# Patient Record
Sex: MALE | Race: ASIAN | NOT HISPANIC OR LATINO | Employment: UNEMPLOYED | URBAN - METROPOLITAN AREA
[De-identification: names, ages, dates, MRNs, and addresses within clinical notes are randomized per-mention and may not be internally consistent; named-entity substitution may affect disease eponyms.]

---

## 2020-06-12 ENCOUNTER — COMMUNICATION - HEALTHEAST (OUTPATIENT)
Dept: SCHEDULING | Facility: CLINIC | Age: 40
End: 2020-06-12

## 2020-06-15 ENCOUNTER — COMMUNICATION - HEALTHEAST (OUTPATIENT)
Dept: NURSING | Facility: CLINIC | Age: 40
End: 2020-06-15

## 2020-06-15 ENCOUNTER — OFFICE VISIT - HEALTHEAST (OUTPATIENT)
Dept: FAMILY MEDICINE | Facility: CLINIC | Age: 40
End: 2020-06-15

## 2020-06-15 DIAGNOSIS — Z76.89 ENCOUNTER TO ESTABLISH CARE: ICD-10-CM

## 2020-06-15 DIAGNOSIS — G47.00 INSOMNIA, UNSPECIFIED TYPE: ICD-10-CM

## 2020-06-15 DIAGNOSIS — Z79.4 TYPE 2 DIABETES MELLITUS WITHOUT COMPLICATION, WITH LONG-TERM CURRENT USE OF INSULIN (H): ICD-10-CM

## 2020-06-15 DIAGNOSIS — E11.9 TYPE 2 DIABETES MELLITUS WITHOUT COMPLICATION, WITH LONG-TERM CURRENT USE OF INSULIN (H): ICD-10-CM

## 2020-06-15 DIAGNOSIS — F06.31 DEPRESSIVE DISORDER DUE TO ANOTHER MEDICAL CONDITION WITH DEPRESSIVE FEATURES: ICD-10-CM

## 2020-06-15 ASSESSMENT — PATIENT HEALTH QUESTIONNAIRE - PHQ9: SUM OF ALL RESPONSES TO PHQ QUESTIONS 1-9: 9

## 2020-06-19 ENCOUNTER — COMMUNICATION - HEALTHEAST (OUTPATIENT)
Dept: NURSING | Facility: CLINIC | Age: 40
End: 2020-06-19

## 2020-06-19 ENCOUNTER — COMMUNICATION - HEALTHEAST (OUTPATIENT)
Dept: CARE COORDINATION | Facility: CLINIC | Age: 40
End: 2020-06-19

## 2020-06-19 DIAGNOSIS — F43.21 ADJUSTMENT DISORDER WITH DEPRESSED MOOD: ICD-10-CM

## 2020-06-19 DIAGNOSIS — F06.31 DEPRESSIVE DISORDER DUE TO ANOTHER MEDICAL CONDITION WITH DEPRESSIVE FEATURES: ICD-10-CM

## 2020-06-19 DIAGNOSIS — F32.2 SEVERE MAJOR DEPRESSION WITHOUT PSYCHOTIC FEATURES (H): ICD-10-CM

## 2020-06-19 ASSESSMENT — ACTIVITIES OF DAILY LIVING (ADL)
DEPENDENT_IADLS:: INDEPENDENT
DEPENDENT_IADLS:: INDEPENDENT

## 2020-06-29 ENCOUNTER — HOME CARE/HOSPICE - HEALTHEAST (OUTPATIENT)
Dept: HOME HEALTH SERVICES | Facility: HOME HEALTH | Age: 40
End: 2020-06-29

## 2020-06-29 ENCOUNTER — RECORDS - HEALTHEAST (OUTPATIENT)
Dept: ADMINISTRATIVE | Facility: OTHER | Age: 40
End: 2020-06-29

## 2020-06-29 ENCOUNTER — COMMUNICATION - HEALTHEAST (OUTPATIENT)
Dept: NURSING | Facility: CLINIC | Age: 40
End: 2020-06-29

## 2020-06-29 DIAGNOSIS — F06.31 DEPRESSIVE DISORDER DUE TO ANOTHER MEDICAL CONDITION WITH DEPRESSIVE FEATURES: ICD-10-CM

## 2020-06-29 DIAGNOSIS — E11.9 TYPE 2 DIABETES MELLITUS WITHOUT COMPLICATION, WITH LONG-TERM CURRENT USE OF INSULIN (H): ICD-10-CM

## 2020-06-29 DIAGNOSIS — Z79.4 TYPE 2 DIABETES MELLITUS WITHOUT COMPLICATION, WITH LONG-TERM CURRENT USE OF INSULIN (H): ICD-10-CM

## 2020-06-29 DIAGNOSIS — F32.2 SEVERE MAJOR DEPRESSION WITHOUT PSYCHOTIC FEATURES (H): ICD-10-CM

## 2020-07-01 ENCOUNTER — COMMUNICATION - HEALTHEAST (OUTPATIENT)
Dept: HOME HEALTH SERVICES | Facility: HOME HEALTH | Age: 40
End: 2020-07-01

## 2020-07-02 ENCOUNTER — COMMUNICATION - HEALTHEAST (OUTPATIENT)
Dept: CARE COORDINATION | Facility: CLINIC | Age: 40
End: 2020-07-02

## 2020-07-02 ENCOUNTER — COMMUNICATION - HEALTHEAST (OUTPATIENT)
Dept: SCHEDULING | Facility: CLINIC | Age: 40
End: 2020-07-02

## 2020-07-03 ENCOUNTER — OFFICE VISIT - HEALTHEAST (OUTPATIENT)
Dept: FAMILY MEDICINE | Facility: CLINIC | Age: 40
End: 2020-07-03

## 2020-07-03 DIAGNOSIS — H10.13 ALLERGIC CONJUNCTIVITIS, BILATERAL: ICD-10-CM

## 2020-07-03 DIAGNOSIS — E11.9 TYPE 2 DIABETES MELLITUS WITHOUT COMPLICATION, WITH LONG-TERM CURRENT USE OF INSULIN (H): ICD-10-CM

## 2020-07-03 DIAGNOSIS — Z79.4 TYPE 2 DIABETES MELLITUS WITHOUT COMPLICATION, WITH LONG-TERM CURRENT USE OF INSULIN (H): ICD-10-CM

## 2020-07-03 DIAGNOSIS — H53.8 BLURRY VISION, BILATERAL: ICD-10-CM

## 2020-07-03 LAB
FASTING STATUS PATIENT QL REPORTED: NO
GLUCOSE BLD-MCNC: 251 MG/DL (ref 74–125)
HBA1C MFR BLD: 7.3 % (ref 3.5–6)

## 2020-07-06 ENCOUNTER — RECORDS - HEALTHEAST (OUTPATIENT)
Dept: ADMINISTRATIVE | Facility: OTHER | Age: 40
End: 2020-07-06

## 2020-07-09 ENCOUNTER — COMMUNICATION - HEALTHEAST (OUTPATIENT)
Dept: NURSING | Facility: CLINIC | Age: 40
End: 2020-07-09

## 2020-07-10 ENCOUNTER — RECORDS - HEALTHEAST (OUTPATIENT)
Dept: ADMINISTRATIVE | Facility: OTHER | Age: 40
End: 2020-07-10

## 2020-07-10 LAB — RETINOPATHY: NORMAL

## 2020-07-15 ENCOUNTER — COMMUNICATION - HEALTHEAST (OUTPATIENT)
Dept: ADMINISTRATIVE | Facility: CLINIC | Age: 40
End: 2020-07-15

## 2020-07-27 ENCOUNTER — COMMUNICATION - HEALTHEAST (OUTPATIENT)
Dept: FAMILY MEDICINE | Facility: CLINIC | Age: 40
End: 2020-07-27

## 2020-07-28 ENCOUNTER — COMMUNICATION - HEALTHEAST (OUTPATIENT)
Dept: NURSING | Facility: CLINIC | Age: 40
End: 2020-07-28

## 2020-08-06 ENCOUNTER — RECORDS - HEALTHEAST (OUTPATIENT)
Dept: HEALTH INFORMATION MANAGEMENT | Facility: CLINIC | Age: 40
End: 2020-08-06

## 2020-08-13 ENCOUNTER — OFFICE VISIT - HEALTHEAST (OUTPATIENT)
Dept: FAMILY MEDICINE | Facility: CLINIC | Age: 40
End: 2020-08-13

## 2020-08-13 DIAGNOSIS — R11.0 NAUSEA: ICD-10-CM

## 2020-08-13 DIAGNOSIS — M62.838 MUSCLE SPASM: ICD-10-CM

## 2020-08-13 RX ORDER — SILDENAFIL CITRATE 20 MG/1
20 TABLET ORAL
Status: SHIPPED | COMMUNITY
Start: 2020-06-19 | End: 2021-11-16

## 2020-08-19 ENCOUNTER — COMMUNICATION - HEALTHEAST (OUTPATIENT)
Dept: FAMILY MEDICINE | Facility: CLINIC | Age: 40
End: 2020-08-19

## 2020-08-21 ENCOUNTER — OFFICE VISIT - HEALTHEAST (OUTPATIENT)
Dept: FAMILY MEDICINE | Facility: CLINIC | Age: 40
End: 2020-08-21

## 2020-08-21 DIAGNOSIS — Z79.4 TYPE 2 DIABETES MELLITUS WITHOUT COMPLICATION, WITH LONG-TERM CURRENT USE OF INSULIN (H): ICD-10-CM

## 2020-08-21 DIAGNOSIS — E11.9 TYPE 2 DIABETES MELLITUS WITHOUT COMPLICATION, WITH LONG-TERM CURRENT USE OF INSULIN (H): ICD-10-CM

## 2020-08-21 LAB
CREAT UR-MCNC: 123.2 MG/DL
FASTING STATUS PATIENT QL REPORTED: NO
GLUCOSE BLD-MCNC: 178 MG/DL (ref 74–125)
MICROALBUMIN UR-MCNC: 1.1 MG/DL (ref 0–1.99)
MICROALBUMIN/CREAT UR: 8.9 MG/G

## 2020-08-21 ASSESSMENT — MIFFLIN-ST. JEOR: SCORE: 1368.37

## 2020-08-27 ENCOUNTER — COMMUNICATION - HEALTHEAST (OUTPATIENT)
Dept: NURSING | Facility: CLINIC | Age: 40
End: 2020-08-27

## 2020-09-11 ENCOUNTER — OFFICE VISIT - HEALTHEAST (OUTPATIENT)
Dept: FAMILY MEDICINE | Facility: CLINIC | Age: 40
End: 2020-09-11

## 2020-09-11 DIAGNOSIS — F51.03 PARADOXICAL INSOMNIA: ICD-10-CM

## 2020-09-11 DIAGNOSIS — E11.00 TYPE 2 DIABETES MELLITUS WITH HYPEROSMOLARITY WITHOUT COMA, WITHOUT LONG-TERM CURRENT USE OF INSULIN (H): ICD-10-CM

## 2020-09-11 DIAGNOSIS — R11.0 NAUSEA: ICD-10-CM

## 2020-09-11 DIAGNOSIS — F06.31 DEPRESSIVE DISORDER DUE TO ANOTHER MEDICAL CONDITION WITH DEPRESSIVE FEATURES: ICD-10-CM

## 2020-09-11 DIAGNOSIS — M54.50 LUMBAR BACK PAIN: ICD-10-CM

## 2020-09-11 LAB
ALBUMIN SERPL-MCNC: 4.3 G/DL (ref 3.5–5)
ALBUMIN UR-MCNC: NEGATIVE MG/DL
ALP SERPL-CCNC: 68 U/L (ref 45–120)
ALT SERPL W P-5'-P-CCNC: 70 U/L (ref 0–45)
ANION GAP SERPL CALCULATED.3IONS-SCNC: 13 MMOL/L (ref 5–18)
APPEARANCE UR: CLEAR
AST SERPL W P-5'-P-CCNC: 38 U/L (ref 0–40)
BILIRUB SERPL-MCNC: 1.3 MG/DL (ref 0–1)
BILIRUB UR QL STRIP: NEGATIVE
BUN SERPL-MCNC: 7 MG/DL (ref 8–22)
CALCIUM SERPL-MCNC: 10.2 MG/DL (ref 8.5–10.5)
CHLORIDE BLD-SCNC: 98 MMOL/L (ref 98–107)
CO2 SERPL-SCNC: 28 MMOL/L (ref 22–31)
COLOR UR AUTO: YELLOW
CREAT SERPL-MCNC: 0.9 MG/DL (ref 0.7–1.3)
GFR SERPL CREATININE-BSD FRML MDRD: >60 ML/MIN/1.73M2
GLUCOSE BLD-MCNC: 141 MG/DL (ref 70–125)
GLUCOSE UR STRIP-MCNC: NEGATIVE MG/DL
HGB UR QL STRIP: NEGATIVE
KETONES UR STRIP-MCNC: NEGATIVE MG/DL
LEUKOCYTE ESTERASE UR QL STRIP: NEGATIVE
NITRATE UR QL: NEGATIVE
PH UR STRIP: 5.5 [PH] (ref 5–8)
POTASSIUM BLD-SCNC: 4.4 MMOL/L (ref 3.5–5)
PROT SERPL-MCNC: 7.8 G/DL (ref 6–8)
SODIUM SERPL-SCNC: 139 MMOL/L (ref 136–145)
SP GR UR STRIP: 1.02 (ref 1–1.03)
UROBILINOGEN UR STRIP-ACNC: NORMAL

## 2020-09-11 ASSESSMENT — MIFFLIN-ST. JEOR: SCORE: 1375.91

## 2020-09-13 ENCOUNTER — COMMUNICATION - HEALTHEAST (OUTPATIENT)
Dept: FAMILY MEDICINE | Facility: CLINIC | Age: 40
End: 2020-09-13

## 2020-09-13 DIAGNOSIS — M62.838 MUSCLE SPASM: ICD-10-CM

## 2020-09-25 ENCOUNTER — COMMUNICATION - HEALTHEAST (OUTPATIENT)
Dept: CARE COORDINATION | Facility: CLINIC | Age: 40
End: 2020-09-25

## 2020-09-25 ENCOUNTER — OFFICE VISIT - HEALTHEAST (OUTPATIENT)
Dept: FAMILY MEDICINE | Facility: CLINIC | Age: 40
End: 2020-09-25

## 2020-09-25 ENCOUNTER — COMMUNICATION - HEALTHEAST (OUTPATIENT)
Dept: NURSING | Facility: CLINIC | Age: 40
End: 2020-09-25

## 2020-09-25 DIAGNOSIS — Z23 NEED FOR INFLUENZA VACCINATION: ICD-10-CM

## 2020-09-25 DIAGNOSIS — Z79.4 TYPE 2 DIABETES MELLITUS WITHOUT COMPLICATION, WITH LONG-TERM CURRENT USE OF INSULIN (H): ICD-10-CM

## 2020-09-25 DIAGNOSIS — R11.0 NAUSEA: ICD-10-CM

## 2020-09-25 DIAGNOSIS — E11.00 TYPE 2 DIABETES MELLITUS WITH HYPEROSMOLARITY WITHOUT COMA, WITHOUT LONG-TERM CURRENT USE OF INSULIN (H): ICD-10-CM

## 2020-09-25 DIAGNOSIS — F06.31 DEPRESSIVE DISORDER DUE TO ANOTHER MEDICAL CONDITION WITH DEPRESSIVE FEATURES: ICD-10-CM

## 2020-09-25 DIAGNOSIS — F51.01 PRIMARY INSOMNIA: ICD-10-CM

## 2020-09-25 DIAGNOSIS — E11.9 TYPE 2 DIABETES MELLITUS WITHOUT COMPLICATION, WITH LONG-TERM CURRENT USE OF INSULIN (H): ICD-10-CM

## 2020-09-25 DIAGNOSIS — G47.00 INSOMNIA, UNSPECIFIED TYPE: ICD-10-CM

## 2020-09-25 ASSESSMENT — MIFFLIN-ST. JEOR: SCORE: 1382.14

## 2020-09-28 ENCOUNTER — COMMUNICATION - HEALTHEAST (OUTPATIENT)
Dept: FAMILY MEDICINE | Facility: CLINIC | Age: 40
End: 2020-09-28

## 2020-09-28 DIAGNOSIS — R11.0 NAUSEA: ICD-10-CM

## 2020-09-28 DIAGNOSIS — Z79.4 TYPE 2 DIABETES MELLITUS WITHOUT COMPLICATION, WITH LONG-TERM CURRENT USE OF INSULIN (H): ICD-10-CM

## 2020-09-28 DIAGNOSIS — E11.9 TYPE 2 DIABETES MELLITUS WITHOUT COMPLICATION, WITH LONG-TERM CURRENT USE OF INSULIN (H): ICD-10-CM

## 2020-09-28 DIAGNOSIS — G47.00 INSOMNIA, UNSPECIFIED TYPE: ICD-10-CM

## 2020-09-28 DIAGNOSIS — F06.31 DEPRESSIVE DISORDER DUE TO ANOTHER MEDICAL CONDITION WITH DEPRESSIVE FEATURES: ICD-10-CM

## 2020-10-15 ENCOUNTER — OFFICE VISIT - HEALTHEAST (OUTPATIENT)
Dept: FAMILY MEDICINE | Facility: CLINIC | Age: 40
End: 2020-10-15

## 2020-10-15 DIAGNOSIS — R11.0 NAUSEA: ICD-10-CM

## 2020-10-15 DIAGNOSIS — Z79.4 TYPE 2 DIABETES MELLITUS WITHOUT COMPLICATION, WITH LONG-TERM CURRENT USE OF INSULIN (H): ICD-10-CM

## 2020-10-15 DIAGNOSIS — H10.13 ALLERGIC CONJUNCTIVITIS, BILATERAL: ICD-10-CM

## 2020-10-15 DIAGNOSIS — E11.9 TYPE 2 DIABETES MELLITUS WITHOUT COMPLICATION, WITH LONG-TERM CURRENT USE OF INSULIN (H): ICD-10-CM

## 2020-10-15 DIAGNOSIS — E11.00 TYPE 2 DIABETES MELLITUS WITH HYPEROSMOLARITY WITHOUT COMA, WITHOUT LONG-TERM CURRENT USE OF INSULIN (H): ICD-10-CM

## 2020-10-15 DIAGNOSIS — F32.2 SEVERE MAJOR DEPRESSION WITHOUT PSYCHOTIC FEATURES (H): ICD-10-CM

## 2020-10-15 DIAGNOSIS — M62.838 MUSCLE SPASM: ICD-10-CM

## 2020-10-15 LAB — HBA1C MFR BLD: 6.4 %

## 2020-10-15 ASSESSMENT — MIFFLIN-ST. JEOR: SCORE: 1362.02

## 2020-10-16 ENCOUNTER — COMMUNICATION - HEALTHEAST (OUTPATIENT)
Dept: FAMILY MEDICINE | Facility: CLINIC | Age: 40
End: 2020-10-16

## 2020-10-16 DIAGNOSIS — E11.9 TYPE 2 DIABETES MELLITUS WITHOUT COMPLICATION, WITH LONG-TERM CURRENT USE OF INSULIN (H): ICD-10-CM

## 2020-10-16 DIAGNOSIS — Z79.4 TYPE 2 DIABETES MELLITUS WITHOUT COMPLICATION, WITH LONG-TERM CURRENT USE OF INSULIN (H): ICD-10-CM

## 2020-10-16 DIAGNOSIS — M54.50 LUMBAR BACK PAIN: ICD-10-CM

## 2020-11-17 ENCOUNTER — COMMUNICATION - HEALTHEAST (OUTPATIENT)
Dept: FAMILY MEDICINE | Facility: CLINIC | Age: 40
End: 2020-11-17

## 2020-11-17 DIAGNOSIS — M62.838 MUSCLE SPASM: ICD-10-CM

## 2021-02-10 ENCOUNTER — COMMUNICATION - HEALTHEAST (OUTPATIENT)
Dept: FAMILY MEDICINE | Facility: CLINIC | Age: 41
End: 2021-02-10

## 2021-02-10 DIAGNOSIS — M62.838 MUSCLE SPASM: ICD-10-CM

## 2021-03-10 ENCOUNTER — COMMUNICATION - HEALTHEAST (OUTPATIENT)
Dept: FAMILY MEDICINE | Facility: CLINIC | Age: 41
End: 2021-03-10

## 2021-03-10 DIAGNOSIS — M62.838 MUSCLE SPASM: ICD-10-CM

## 2021-03-11 ENCOUNTER — COMMUNICATION - HEALTHEAST (OUTPATIENT)
Dept: ADMINISTRATIVE | Facility: CLINIC | Age: 41
End: 2021-03-11

## 2021-03-11 DIAGNOSIS — F06.31 DEPRESSIVE DISORDER DUE TO ANOTHER MEDICAL CONDITION WITH DEPRESSIVE FEATURES: ICD-10-CM

## 2021-03-11 DIAGNOSIS — E11.9 TYPE 2 DIABETES MELLITUS WITHOUT COMPLICATION, WITH LONG-TERM CURRENT USE OF INSULIN (H): ICD-10-CM

## 2021-03-11 DIAGNOSIS — Z79.4 TYPE 2 DIABETES MELLITUS WITHOUT COMPLICATION, WITH LONG-TERM CURRENT USE OF INSULIN (H): ICD-10-CM

## 2021-03-18 ENCOUNTER — OFFICE VISIT - HEALTHEAST (OUTPATIENT)
Dept: FAMILY MEDICINE | Facility: CLINIC | Age: 41
End: 2021-03-18

## 2021-03-18 DIAGNOSIS — E11.9 TYPE 2 DIABETES MELLITUS WITHOUT COMPLICATION, WITH LONG-TERM CURRENT USE OF INSULIN (H): ICD-10-CM

## 2021-03-18 DIAGNOSIS — F32.2 SEVERE MAJOR DEPRESSION WITHOUT PSYCHOTIC FEATURES (H): ICD-10-CM

## 2021-03-18 DIAGNOSIS — F51.01 PRIMARY INSOMNIA: ICD-10-CM

## 2021-03-18 DIAGNOSIS — Z79.4 TYPE 2 DIABETES MELLITUS WITHOUT COMPLICATION, WITH LONG-TERM CURRENT USE OF INSULIN (H): ICD-10-CM

## 2021-03-18 ASSESSMENT — PATIENT HEALTH QUESTIONNAIRE - PHQ9: SUM OF ALL RESPONSES TO PHQ QUESTIONS 1-9: 9

## 2021-03-18 ASSESSMENT — MIFFLIN-ST. JEOR: SCORE: 1362.02

## 2021-04-18 ENCOUNTER — COMMUNICATION - HEALTHEAST (OUTPATIENT)
Dept: FAMILY MEDICINE | Facility: CLINIC | Age: 41
End: 2021-04-18

## 2021-04-18 DIAGNOSIS — M62.838 MUSCLE SPASM: ICD-10-CM

## 2021-05-06 ENCOUNTER — COMMUNICATION - HEALTHEAST (OUTPATIENT)
Dept: FAMILY MEDICINE | Facility: CLINIC | Age: 41
End: 2021-05-06

## 2021-05-06 ENCOUNTER — OFFICE VISIT - HEALTHEAST (OUTPATIENT)
Dept: FAMILY MEDICINE | Facility: CLINIC | Age: 41
End: 2021-05-06

## 2021-05-06 ENCOUNTER — AMBULATORY - HEALTHEAST (OUTPATIENT)
Dept: LAB | Facility: CLINIC | Age: 41
End: 2021-05-06

## 2021-05-06 DIAGNOSIS — G89.29 CHRONIC LOW BACK PAIN, UNSPECIFIED BACK PAIN LATERALITY, UNSPECIFIED WHETHER SCIATICA PRESENT: ICD-10-CM

## 2021-05-06 DIAGNOSIS — B18.2 CHRONIC HEPATITIS C WITHOUT HEPATIC COMA (H): ICD-10-CM

## 2021-05-06 DIAGNOSIS — E11.9 TYPE 2 DIABETES MELLITUS WITHOUT COMPLICATION, WITH LONG-TERM CURRENT USE OF INSULIN (H): ICD-10-CM

## 2021-05-06 DIAGNOSIS — Z79.4 TYPE 2 DIABETES MELLITUS WITHOUT COMPLICATION, WITH LONG-TERM CURRENT USE OF INSULIN (H): ICD-10-CM

## 2021-05-06 DIAGNOSIS — M54.50 CHRONIC LOW BACK PAIN, UNSPECIFIED BACK PAIN LATERALITY, UNSPECIFIED WHETHER SCIATICA PRESENT: ICD-10-CM

## 2021-05-06 DIAGNOSIS — E78.5 HYPERLIPIDEMIA, UNSPECIFIED HYPERLIPIDEMIA TYPE: ICD-10-CM

## 2021-05-06 DIAGNOSIS — H10.13 ALLERGIC CONJUNCTIVITIS, BILATERAL: ICD-10-CM

## 2021-05-06 DIAGNOSIS — K21.9 GASTROESOPHAGEAL REFLUX DISEASE WITHOUT ESOPHAGITIS: ICD-10-CM

## 2021-05-06 DIAGNOSIS — H02.889 MEIBOMIAN GLAND DYSFUNCTION: ICD-10-CM

## 2021-05-06 DIAGNOSIS — Z91.148 NONCOMPLIANCE WITH MEDICATION REGIMEN: ICD-10-CM

## 2021-05-06 DIAGNOSIS — J30.1 SEASONAL ALLERGIC RHINITIS DUE TO POLLEN: ICD-10-CM

## 2021-05-06 DIAGNOSIS — F51.01 PRIMARY INSOMNIA: ICD-10-CM

## 2021-05-06 DIAGNOSIS — F06.31 DEPRESSIVE DISORDER DUE TO ANOTHER MEDICAL CONDITION WITH DEPRESSIVE FEATURES: ICD-10-CM

## 2021-05-06 DIAGNOSIS — G43.909 MIGRAINE WITHOUT STATUS MIGRAINOSUS, NOT INTRACTABLE, UNSPECIFIED MIGRAINE TYPE: ICD-10-CM

## 2021-05-06 LAB
ALBUMIN SERPL-MCNC: 3.9 G/DL (ref 3.5–5)
ALP SERPL-CCNC: 72 U/L (ref 45–120)
ALT SERPL W P-5'-P-CCNC: 43 U/L (ref 0–45)
AST SERPL W P-5'-P-CCNC: 27 U/L (ref 0–40)
BILIRUB DIRECT SERPL-MCNC: 0.4 MG/DL
BILIRUB SERPL-MCNC: 1.6 MG/DL (ref 0–1)
HBA1C MFR BLD: 8.1 %
HIV 1+2 AB+HIV1 P24 AG SERPL QL IA: NEGATIVE
LDLC SERPL CALC-MCNC: 106 MG/DL
PROT SERPL-MCNC: 7.2 G/DL (ref 6–8)

## 2021-05-07 ENCOUNTER — COMMUNICATION - HEALTHEAST (OUTPATIENT)
Dept: NURSING | Facility: CLINIC | Age: 41
End: 2021-05-07

## 2021-05-07 ENCOUNTER — COMMUNICATION - HEALTHEAST (OUTPATIENT)
Dept: FAMILY MEDICINE | Facility: CLINIC | Age: 41
End: 2021-05-07

## 2021-05-07 LAB — HAV IGG SER QL IA: POSITIVE

## 2021-05-10 ENCOUNTER — COMMUNICATION - HEALTHEAST (OUTPATIENT)
Dept: PHARMACY | Facility: CLINIC | Age: 41
End: 2021-05-10

## 2021-05-10 ENCOUNTER — COMMUNICATION - HEALTHEAST (OUTPATIENT)
Dept: FAMILY MEDICINE | Facility: CLINIC | Age: 41
End: 2021-05-10

## 2021-05-10 DIAGNOSIS — M54.50 CHRONIC LOW BACK PAIN, UNSPECIFIED BACK PAIN LATERALITY, UNSPECIFIED WHETHER SCIATICA PRESENT: ICD-10-CM

## 2021-05-10 DIAGNOSIS — E11.9 TYPE 2 DIABETES MELLITUS WITHOUT COMPLICATION, WITH LONG-TERM CURRENT USE OF INSULIN (H): ICD-10-CM

## 2021-05-10 DIAGNOSIS — Z79.4 TYPE 2 DIABETES MELLITUS WITHOUT COMPLICATION, WITH LONG-TERM CURRENT USE OF INSULIN (H): ICD-10-CM

## 2021-05-10 DIAGNOSIS — G89.29 CHRONIC LOW BACK PAIN, UNSPECIFIED BACK PAIN LATERALITY, UNSPECIFIED WHETHER SCIATICA PRESENT: ICD-10-CM

## 2021-05-14 ENCOUNTER — RECORDS - HEALTHEAST (OUTPATIENT)
Dept: ADMINISTRATIVE | Facility: OTHER | Age: 41
End: 2021-05-14

## 2021-05-20 ENCOUNTER — OFFICE VISIT - HEALTHEAST (OUTPATIENT)
Dept: FAMILY MEDICINE | Facility: CLINIC | Age: 41
End: 2021-05-20

## 2021-05-20 ENCOUNTER — OFFICE VISIT - HEALTHEAST (OUTPATIENT)
Dept: PHARMACY | Facility: CLINIC | Age: 41
End: 2021-05-20

## 2021-05-20 ENCOUNTER — RECORDS - HEALTHEAST (OUTPATIENT)
Dept: ADMINISTRATIVE | Facility: OTHER | Age: 41
End: 2021-05-20

## 2021-05-20 DIAGNOSIS — Z79.4 TYPE 2 DIABETES MELLITUS WITHOUT COMPLICATION, WITH LONG-TERM CURRENT USE OF INSULIN (H): ICD-10-CM

## 2021-05-20 DIAGNOSIS — H10.13 ALLERGIC CONJUNCTIVITIS, BILATERAL: ICD-10-CM

## 2021-05-20 DIAGNOSIS — E11.9 TYPE 2 DIABETES MELLITUS WITHOUT COMPLICATION, WITH LONG-TERM CURRENT USE OF INSULIN (H): ICD-10-CM

## 2021-05-20 DIAGNOSIS — E11.00 TYPE 2 DIABETES MELLITUS WITH HYPEROSMOLARITY WITHOUT COMA, WITHOUT LONG-TERM CURRENT USE OF INSULIN (H): ICD-10-CM

## 2021-05-20 DIAGNOSIS — G89.29 CHRONIC LOW BACK PAIN, UNSPECIFIED BACK PAIN LATERALITY, UNSPECIFIED WHETHER SCIATICA PRESENT: ICD-10-CM

## 2021-05-20 DIAGNOSIS — M54.50 CHRONIC LOW BACK PAIN, UNSPECIFIED BACK PAIN LATERALITY, UNSPECIFIED WHETHER SCIATICA PRESENT: ICD-10-CM

## 2021-05-20 DIAGNOSIS — F51.01 PRIMARY INSOMNIA: ICD-10-CM

## 2021-05-20 DIAGNOSIS — J30.1 SEASONAL ALLERGIC RHINITIS DUE TO POLLEN: ICD-10-CM

## 2021-05-20 DIAGNOSIS — F06.31 DEPRESSIVE DISORDER DUE TO ANOTHER MEDICAL CONDITION WITH DEPRESSIVE FEATURES: ICD-10-CM

## 2021-05-20 DIAGNOSIS — Z59.71 INSURANCE COVERAGE PROBLEMS: ICD-10-CM

## 2021-05-20 DIAGNOSIS — K21.9 GASTROESOPHAGEAL REFLUX DISEASE WITHOUT ESOPHAGITIS: ICD-10-CM

## 2021-05-20 ASSESSMENT — MIFFLIN-ST. JEOR: SCORE: 1378.46

## 2021-05-26 ENCOUNTER — COMMUNICATION - HEALTHEAST (OUTPATIENT)
Dept: NURSING | Facility: CLINIC | Age: 41
End: 2021-05-26

## 2021-05-26 ENCOUNTER — AMBULATORY - HEALTHEAST (OUTPATIENT)
Dept: FAMILY MEDICINE | Facility: CLINIC | Age: 41
End: 2021-05-26

## 2021-05-26 DIAGNOSIS — J30.1 SEASONAL ALLERGIC RHINITIS DUE TO POLLEN: ICD-10-CM

## 2021-05-26 DIAGNOSIS — Z79.4 TYPE 2 DIABETES MELLITUS WITHOUT COMPLICATION, WITH LONG-TERM CURRENT USE OF INSULIN (H): ICD-10-CM

## 2021-05-26 DIAGNOSIS — G89.29 CHRONIC LOW BACK PAIN, UNSPECIFIED BACK PAIN LATERALITY, UNSPECIFIED WHETHER SCIATICA PRESENT: ICD-10-CM

## 2021-05-26 DIAGNOSIS — Z91.148 NONCOMPLIANCE WITH MEDICATION REGIMEN: ICD-10-CM

## 2021-05-26 DIAGNOSIS — F06.31 DEPRESSIVE DISORDER DUE TO ANOTHER MEDICAL CONDITION WITH DEPRESSIVE FEATURES: ICD-10-CM

## 2021-05-26 DIAGNOSIS — E11.9 TYPE 2 DIABETES MELLITUS WITHOUT COMPLICATION, WITH LONG-TERM CURRENT USE OF INSULIN (H): ICD-10-CM

## 2021-05-26 DIAGNOSIS — E11.00 TYPE 2 DIABETES MELLITUS WITH HYPEROSMOLARITY WITHOUT COMA, WITHOUT LONG-TERM CURRENT USE OF INSULIN (H): ICD-10-CM

## 2021-05-26 DIAGNOSIS — H10.13 ALLERGIC CONJUNCTIVITIS, BILATERAL: ICD-10-CM

## 2021-05-26 DIAGNOSIS — M54.50 CHRONIC LOW BACK PAIN, UNSPECIFIED BACK PAIN LATERALITY, UNSPECIFIED WHETHER SCIATICA PRESENT: ICD-10-CM

## 2021-05-26 DIAGNOSIS — F51.01 PRIMARY INSOMNIA: ICD-10-CM

## 2021-05-26 DIAGNOSIS — K21.9 GASTROESOPHAGEAL REFLUX DISEASE WITHOUT ESOPHAGITIS: ICD-10-CM

## 2021-05-26 RX ORDER — ATORVASTATIN CALCIUM 20 MG/1
20 TABLET, FILM COATED ORAL DAILY
Qty: 90 TABLET | Refills: 3 | Status: SHIPPED | OUTPATIENT
Start: 2021-05-26 | End: 2022-03-07

## 2021-05-26 RX ORDER — BLOOD SUGAR DIAGNOSTIC
1 STRIP MISCELLANEOUS DAILY
Qty: 100 STRIP | Refills: 3 | Status: SHIPPED | OUTPATIENT
Start: 2021-05-26 | End: 2021-11-16

## 2021-05-26 RX ORDER — INSULIN GLARGINE 100 [IU]/ML
20 INJECTION, SOLUTION SUBCUTANEOUS AT BEDTIME
Qty: 18 ML | Refills: 3 | Status: SHIPPED | OUTPATIENT
Start: 2021-05-26 | End: 2021-07-10

## 2021-05-26 RX ORDER — LANCETS
EACH MISCELLANEOUS
Qty: 100 EACH | Refills: 3 | Status: SHIPPED | OUTPATIENT
Start: 2021-05-26 | End: 2021-11-16

## 2021-05-26 RX ORDER — OLOPATADINE HYDROCHLORIDE 1 MG/ML
1 SOLUTION/ DROPS OPHTHALMIC 2 TIMES DAILY
Qty: 5 ML | Refills: 3 | Status: SHIPPED | OUTPATIENT
Start: 2021-05-26 | End: 2021-11-16

## 2021-05-26 RX ORDER — QUETIAPINE FUMARATE 50 MG/1
50 TABLET, FILM COATED ORAL AT BEDTIME
Qty: 90 TABLET | Refills: 3 | Status: SHIPPED | OUTPATIENT
Start: 2021-05-26 | End: 2021-11-16

## 2021-05-26 RX ORDER — FAMOTIDINE 20 MG/1
20 TABLET, FILM COATED ORAL 2 TIMES DAILY
Qty: 180 TABLET | Refills: 0 | Status: SHIPPED | OUTPATIENT
Start: 2021-05-26 | End: 2021-08-27

## 2021-05-26 RX ORDER — MELOXICAM 15 MG/1
15 TABLET ORAL DAILY
Qty: 90 TABLET | Refills: 3 | Status: SHIPPED | OUTPATIENT
Start: 2021-05-26 | End: 2021-11-16

## 2021-05-26 ASSESSMENT — ACTIVITIES OF DAILY LIVING (ADL): DEPENDENT_IADLS:: INDEPENDENT

## 2021-05-27 VITALS
TEMPERATURE: 98.3 F | HEART RATE: 81 BPM | RESPIRATION RATE: 16 BRPM | SYSTOLIC BLOOD PRESSURE: 110 MMHG | OXYGEN SATURATION: 99 % | DIASTOLIC BLOOD PRESSURE: 76 MMHG

## 2021-05-27 VITALS — WEIGHT: 131 LBS | HEIGHT: 62 IN | BODY MASS INDEX: 23.96 KG/M2 | BODY MASS INDEX: 25.61 KG/M2

## 2021-06-08 NOTE — TELEPHONE ENCOUNTER
New Appointment Needed  What is the reason for the visit:    Immigration physical- patient is new  Provider Preference: Any available  How soon do you need to be seen?: as soon as the patient is able to get in for an appointment.   Waitlist offered?: No  Okay to leave a detailed message:  Yes

## 2021-06-08 NOTE — PROGRESS NOTES
Clinic Care Coordination Contact  Community Health Worker Initial Outreach    CHW Initial Information Gathering:  Referral Source: PCP  Preferred Hospital: [facility]  [phone]  Preferred Urgent Care: [facility], [phone]  Current living arrangement:: I live in a private home with family  Type of residence:: Private home - stairs  Community Resources: None  Supplies used at home:: Diabetic Supplies  Equipment Currently Used at Home: none  Informal Support system:: Family, Friends  No PCP office visit in Past Year: Yes  Transportation means:: Medical transport    Patient accepts CC: Yes     The Clinic Community Health Worker spoke with the patient today to discuss possible Clinic Care Coordination enrollment.  The service was described to the patient and immediate needs were discussed.  The patient agreed to enrollment and an assessment was scheduled.  The patient was provided with contact information for the clinic CHW.             Assessment date: 06/19/2020

## 2021-06-08 NOTE — PROGRESS NOTES
"Pablo Russo is a 39 y.o. male who is being evaluated via a billable telephone visit.      Assessment and plan  Pablo was seen today for establish care and diabetes.    Diagnoses and all orders for this visit:    Encounter to establish care  Last A1c was in March 2020, due for recheck.  We will follow-up on 7/3/2020 to recheck his labs.  Patient was going to [facility], said his they could not help him with the citizenship waiver so he decided to find another clinic.  Per chart review he has seen multiple psychiatrists, but I will need to review this.  He has not started the citizenship process, does not know how to.  Will refer to care management, possibly connect him with [facility] for resources.    Type 2 diabetes mellitus without complication, with long-term current use of insulin (H)  Last A1c on March 2020 was 7.6, goal for 39-year-old would be closer to 7.  Will continue Lantus 10 units daily, metformin.  Patient does have all diabetic supplies but does not know how to check his own blood sugars.  Will have patient bring all his supplies to clinic, consider referral to diabetic educator  -     pen needle, diabetic (BD ULTRA-FINE MAHSA PEN NEEDLE) 32 gauge x 5/32\" Ndle; Inject 1 each under the skin daily.  -     metFORMIN (GLUCOPHAGE) 1000 MG tablet; Take 1 tablet (1,000 mg total) by mouth 2 (two) times a day.  -     atorvastatin (LIPITOR) 20 MG tablet; Take 1 tablet (20 mg total) by mouth daily.    Insomnia, unspecified type  -     traZODone (DESYREL) 50 MG tablet; Take 1 tablet (50 mg total) by mouth at bedtime.    Depressive disorder due to another medical condition with depressive features  -     sertraline (ZOLOFT) 50 MG tablet; Take 1 tablet (50 mg total) by mouth daily.  -     Ambulatory referral to Care Management (Primary Care)    Follow-up in 2 weeks to continue establish care, recheck diabetes labs.    =============================================================================      The patient has been " "notified of following:     \"This telephone visit will be conducted via a call between you and your physician/provider. We have found that certain health care needs can be provided without the need for a physical exam.  This service lets us provide the care you need with a short phone conversation.  If a prescription is necessary we can send it directly to your pharmacy.  If lab work is needed we can place an order for that and you can then stop by our lab to have the test done at a later time.    Telephone visits are billed at different rates depending on your insurance coverage. During this emergency period, for some insurers they may be billed the same as an in-person visit.  Please reach out to your insurance provider with any questions.    If during the course of the call the physician/provider feels a telephone visit is not appropriate, you will not be charged for this service.\"    Patient has given verbal consent to a Telephone visit? Yes    Patient would like to receive their AVS by AVS Preference: Mail a copy.    Additional provider notes:     Establish care.  Patient was previously a [facility], wanted to help with his citizenship waiver but was told they could not.  He has seen multiple psychiatrists in the past but I am not sure to what extent.  He does carry a diagnosis of depression, I will have to review his chart to see if he needs further evaluation to qualify him for citizenship waiver.    Diabetes.  He has all his diabetic supplies but does not know how to check his blood sugars.  I am not sure if he is illiterate, did not ask him at this visit.  He does know how to give himself insulin, last A1c was 7.6 in March 2020.  He is also on metformin.  He would benefit from diabetic educator, not sure if he has been in the past, will review his chart.  Patient is not sure.    Insomnia, depression  Right now, he says his depression is doing \"okay\" because he is on his medications.  He has a prescription " for sertraline 50 mg daily.  He also takes trazodone for sleep.  Right now, he denies any issues with sleep.    I will have patient come into clinic for further evaluation, establish care.  I will also review his chart thoroughly before that visit to determine if he needs further evaluation for a possible citizenship waiver in the future due to behavioral health issues.  He would benefit from more resources in the community, connection to [facility], behavioral health resources, diabetic educator or [facility] pharmacy to teach him how to use his glucometer.    Phone call start time 2:15 PM  Phone call end time 2:33 PM  Phone call duration: 22 minutes    Rosibel Mcduffie, CMA

## 2021-06-09 NOTE — TELEPHONE ENCOUNTER
"Please see email below from [facility]:      Good morning,      Donna 1980, we will send a release for patient to sign so we can get records from [facility], specifically the diagnostic assessment to determine medical necessity of the evaluation.  Based on the current information that is available, there does not appear to be medical necessity for the evaluation. In the interim, we advise patient to participate in a prep course to support concerns about memory functioning. We'll follow up with you once we review records .    Thanks  --     Cole Obregon  [facility], [address]  [place] [address]  Phone: [phone]  Fax: [phone]  [email or web address]    \"The unauthorized disclosure or interception of e-mail is a federal crime. See 18 U.S.[facility]. 2517(4). This e-mail is intended only for the use of those whom it is addressed and may contain information which is privileged, confidential and exempt from disclosure under the law. If you have received this e-mail in error, do not distribute or copy it. Return it immediately to the sender with attachments, if any, and notify the sender by telephone.\"    "

## 2021-06-09 NOTE — TELEPHONE ENCOUNTER
CCC SW received a phone call from Kayla at [facility]. She wanted to inform the [facility]/PCP team that she has spoken with the pt at length, completed a DA, and will be assigning him an [facility] worker and in-home therapist.    Kayla stated that the green card examination appointment pt has scheduled for 10/7/20 with Dr. Green should be cancelled. Apparently, pt's green card was just updated in 2019. [facility] worker/[facility] staff will be assisting pt with the citizenship process moving forward.     Kayla also agrees with the referral for neuropsychological testing through [facility] ([facility]) and feels that an N-648 waiver should come from them if warranted.

## 2021-06-09 NOTE — TELEPHONE ENCOUNTER
Dr. Ortiz:    I spoke with this pt via phone today and feel it would be most appropriate for him to undergo neuropsychological testing and an N-648 waiver evaluation at [facility]. If you are comfortable with this, please place an order.     Thanks so much!    Priscilla Lou, BSW, LSW

## 2021-06-09 NOTE — TELEPHONE ENCOUNTER
Called patient with cristiana .     He confirmed he symptoms of increased urination, otherwise feeling ok.   Will increase lantus to 15u at bedtime.   I will call him in the morning to reevaluate symptoms.     He would benefit greatly from glucometer teaching, he says he does not know how to use it.     Peggy Ortiz MD

## 2021-06-09 NOTE — PROGRESS NOTES
RN Delegation:    Check on status of home care.      Per [facility] he was discharged from home care as he reported he would not be available during the day for home car visits because he works overnight and sleeps during the day.        CHW Outreach: 7/28/2020    CHW Plan: Check on status of [facility] referral, he may not be a good for for [facility] either if there is not a day and time that works for him to meet with the [facility] worker.

## 2021-06-09 NOTE — TELEPHONE ENCOUNTER
"    Call from [facility] RN - home care RN    Home health RN in to see him today - he had been c/o \"feeling weird\" and urinating more frequently          Blood sugars at home today   359 1115am   383 at 1140am    388 just now now        Does not have sliding scale       Yes has a (+) CVD19 case contact at home (family tested (+))      He does not have any other sx himself other than what is noted above       She is wondering what else this morning?          Will need Radha       Pt tele# [phone]      A/P:   > Give him a glass of water for now - I will message team re any changes in insulin orders or need for further care             COVID 19 Nurse Triage Plan/Patient Instructions    Please be aware that novel coronavirus (COVID-19) may be circulating in the community. If you develop symptoms such as fever, cough, or SOB or if you have concerns about the presence of another infection including coronavirus (COVID-19), please contact your health care provider or visit [email or web address].     Disposition/Instructions    Patient to stay at home and follow home care protocol based instructions.    Thank you for taking steps to prevent the spread of this virus.  o Limit your contact with others.  o Wear a simple mask to cover your cough.  o Wash your hands well and often.    Resources    [facility]: About COVID-19: [email or web address]    CDC: What to Do If You're Sick: www.cdc.gov/coronavirus/2019-ncov/about/steps-when-sick.html    CDC: Ending Home Isolation: www.cdc.gov/coronavirus/2019-ncov/hcp/disposition-in-home-patients.html     CDC: Caring for Someone: www.cdc.gov/coronavirus/2019-ncov/if-you-are-sick/care-for-someone.html     [facility]: Interim Guidance for Hospital Discharge to Home: www.health.[facility].[place]/diseases/coronavirus/hcp/hospdischarge.pdf    [facility] clinical trials (COVID-19 research studies): clinicalaffairs.[facility]/umn-clinical-trials     Below are the COVID-19 " hotlines at the [place] Department of Health ([facility]). Interpreters are available.   o For health questions: Call 058-611-1803 or 1-922.545.6303 (7 a.m. to 7 p.m.)  o For questions about schools and childcare: Call 647-859-2573 or 1-374.198.1999 (7 a.m. to 7 p.m.)           Reason for Disposition    Blood glucose > 300 mg/dL (16.7 mmol/L) AND two or more times in a row    Additional Information    Negative: Unconscious or difficult to awaken    Negative: Acting confused (e.g., disoriented, slurred speech)    Negative: Very weak (can't stand)    Negative: Sounds like a life-threatening emergency to the triager    Negative: Vomiting and signs of dehydration (e.g., very dry mouth, lightheaded, dark urine)    Negative: Blood glucose > 240 mg/dL (13.3 mmol/L) and rapid breathing    Negative: Blood glucose > 500 mg/dL (27.8 mmol/L)    Negative: Blood glucose > 240 mg/dL (13.3 mmol/L) AND urine ketones moderate-large (or more than 1+)    Negative: Blood glucose > 240 mg/dL (13.3 mmol/L) and blood ketones > 1.4 mmol/L    Negative: Blood glucose > 240 mg/dL (13.3 mmol/L) AND vomiting AND unable to check for ketones (in blood or urine)    Negative: Vomiting lasting > 4 hours    Negative: Patient sounds very sick or weak to the triager    Negative: Fever > 100.5 F (38.1 C)    Negative: Caller has URGENT medication or insulin pump question and triager unable to answer question    Negative: Blood glucose > 400 mg/dL (22.2 mmol/L)    Protocols used: DIABETES - HIGH BLOOD SUGAR-A-OH

## 2021-06-09 NOTE — TELEPHONE ENCOUNTER
Dr. Oritz,     Due to [facility] capacity, we were unable to see the patient within the 48 hrs. Therefore, the patient was vended to our partnering agency [facility].        Thank you,   [facility]  [phone]

## 2021-06-09 NOTE — PROGRESS NOTES
"Pablo Russo is a 39 y.o. male here for follow up DM    ASSESSMENT/PLAN:   Pablo was seen today for diabetes and depression.    Diagnoses and all orders for this visit:    Type 2 diabetes mellitus without complication, with long-term current use of insulin (H)  Patient still having some symptoms of hyperglycemia but overall stable. No increased thirst, urination at this time. Vision is \"ok.\" He wanted repeat teaching of his glucometer, which we did. He was able to demonstrate his ability to use it. Blood glucose 235.   -     Glycosylated Hemoglobin A1c  -     Glucose    Allergic conjunctivitis, bilateral  -     olopatadine (PATANOL) 0.1 % ophthalmic solution; Administer 1 drop to both eyes 2 (two) times a day.  -     cetirizine (ZYRTEC) 10 MG tablet; Take 1 tablet (10 mg total) by mouth daily.  -     Ambulatory referral to Ophthalmology    Blurry vision, bilateral  -     Ambulatory referral to Ophthalmology      Return in about 2 weeks (around 7/17/2020) for diabetes follow up.       ======================================================    SUBJECTIVE  Pablo Russo is a 39 y.o. male here for follow up diabetes.     Seen by home nurse, services were discontinued because he works from 6am-6pm most days.     Blood glucose not well controlled - started on lantus 10u initially. Home nurse helped patient check blood glucose and was in 300's. Was feeling \"off.\" We increased insulin lantus to 15u at bedtime and he feels a little better today.     He did not know how to check his own blood glucose at home.    ROS  Complete 10 point review of systems negative except as noted above in HPI    Reviewed Past Medical History, Medications, Family History and Social History in Epic and up to date with no new changes.    OBJECTIVE  /62   Pulse 89   Temp 98  F (36.7  C) (Oral)   Resp 16   Wt 129 lb (58.5 kg)   SpO2 99%      General: Cooperative, pleasant, in no acute distress  HEENT: PERRL, EOMI.  TM normal bilaterally.  Pharynx " normal without erythema.  Tonsils normal without hypertrophy or exudates.  Neck: no lymphadenopathy, no masses  CV: RRR, normal S1/S2, no murmur, rubs, gallops  Resp: No respiratory distress. Clear to auscultation bilaterally. No wheezes, rales, rhonchi  Abd: Nontender, nondistended, bowel sounds present  Ext: radial/pedal pulses +2 bilaterally  MSK: Normal muscle tone  Neuro: CN II-XII intact  Skin: warm, well perfused. No   Psych: No suicidal or homicidal ideations, no self-harm.  Normal affect.    LABS & IMAGES   Results for orders placed or performed in visit on 07/03/20   Glycosylated Hemoglobin A1c   Result Value Ref Range    Hemoglobin A1c 7.3 (H) 3.5 - 6.0 %   Glucose   Result Value Ref Range    Glucose 251 (H) 74 - 125 mg/dL    Patient Fasting > 8hrs? No          ======================================================  Spent 25 min face to face with patient with more the 50% spent in counseling, reviewing chart, and coordination of care and discussing problems listed above.     Visit was completed along with Radha     Options for treatment and follow-up care were reviewed with the patient. Hsa M Karan and/or guardian was engaged and actively involved in the decision making process. Hsa M Karan and/or guardian verbalized understanding of the options discussed and was satisfied with the final plan.      Peggy Ortiz MD

## 2021-06-09 NOTE — TELEPHONE ENCOUNTER
Called HC RN at [facility].  Nurse that saw pt today discharged him because pt works 6p-6a and is unable to comply with HC hours of 8-5.    It would be difficult to do over the phone especially with pt work hours and language barrier. It needs to be a hands on learning.   Sorry.

## 2021-06-09 NOTE — TELEPHONE ENCOUNTER
Referral placed for [facility] for psychology for neuropsych testing and evaluation for waiver.       Peggy Ortiz MD

## 2021-06-09 NOTE — PROGRESS NOTES
"Clinic Care Coordination Medication Education INITIAL Visit    Patient presents for:  Medication education, Compliance monitoring and Medication reconciliation    Language: Radha    Communication: Illiterate    Patient Living Situation:  Independent    Primary Care Provider:  Peggy Ortiz MD    Barriers:  Financial, Language, Cultural, Poor insight into disease process, Inadequate support at home, Non-compliance of medications and Multiple uncontrolled disease states    Medication List (see cited below):     Current Outpatient Medications   Medication Sig     ACCU-CHEK FASTCLIX LANCET DRUM USE 1 EACH TO TEST DAILY.     ACCU-CHEK GUIDE TEST STRIPS strips USE TO TEST BLOOD GLUCLOSE ONCE D UTD     acetaminophen (TYLENOL) 500 MG tablet Take 500-1,000 mg by mouth.     atorvastatin (LIPITOR) 20 MG tablet Take 1 tablet (20 mg total) by mouth daily.     blood glucose test strips Use to test bg 1 time daily. Use as directed.     insulin glargine (LANTUS SOLOSTAR U-100 INSULIN) 100 unit/mL (3 mL) pen Inject 10 Units under the skin at bedtime.     lancets 25 gauge Misc 1 each. TEST DAILY     metFORMIN (GLUCOPHAGE) 1000 MG tablet Take 1 tablet (1,000 mg total) by mouth 2 (two) times a day.     olopatadine (PATANOL) 0.1 % ophthalmic solution 1 drop.     pen needle, diabetic (BD ULTRA-FINE MAHSA PEN NEEDLE) 32 gauge x 5/32\" Ndle Inject 1 each under the skin daily.     sertraline (ZOLOFT) 50 MG tablet Take 1 tablet (50 mg total) by mouth daily.     traZODone (DESYREL) 50 MG tablet Take 1 tablet (50 mg total) by mouth at bedtime.         Future Appointments   Date Time Provider Department Center   6/29/2020  3:00 PM [facility]   7/3/2020 10:00 AM [facility]   10/7/2020  5:20 PM Nikko Green MD [facility]       Action Plan  RN Will:  Will be available as needed    Care Guide Will:  Care Guide Delegation      Nursing Notes:    Completed initial MEV with patient via phone " "today.   Writer was having a hard time understanding patient during this call. Had to ask patient to repeat himself 2-3 times with each answer due to speech, words of choice he uses, and poor phone connection.     Patient was able to tell writer what medicines he's taking because he can't read English. States \" I don't read, speak or write English and I never been to school\".     States he hasn't been checking his BG because he doesn't know how to use glucometer. Instructed patient he could ask the pharmacist at [facility] to teach him but he would need a cristiana  or a friend that       1) DM II mgmt/teaching   A1c - 11.7 on 2/11/2020    States he doesn't know how to check his BG using glucometer. States no on has taught him.   Moved to [place] from [place] in November, 2019.   Patient will need additional teaching how to use glucometer and keep log on BG readings and how to take his meds correctly.   Patient agrees to try home care nurse to teach him to use glucometer and how to take his meds correctly.   Patient was reluctant to have home care nurse coming to see him at home. Patient states \" I can come to the clinic today for someone to teach me\" Explained to patient we don't have walk-in just for to teach him how to use glucometer and set up meds.   Writer can't assist him because I work from home due to covid-19.       1) Tylenol PRN - states not sure    2) Atorvastatin 20mg daily - states he takes it sometimes AM and sometimes HS    3) metformin 1000mg (1) tab two times a day - states he takes (1) tab daily sometimes he takes AM and sometimes he takes it HS    4) Sertraline 50mg daily HS    5) Trazodone 50mg HS     6) Lantus 10 units HS     CHW to f/u on home care status by Thursday, 7/2/2020    CCC RN will be available as needed          "

## 2021-06-10 NOTE — PROGRESS NOTES
Clinic Care Coordination Contact    Community Health Worker Follow Up    Goals:     Goals Addressed                 This Visit's Progress       Patient Stated      COMPLETED: Mental Health Management 1 (pt-stated)   100%     Goal statement: I would like to be referred for [facility] and in-home therapy within the next 60 days.    Date goal set: 6/19/20  Barriers: Language barrier, poor historian, memory issues/concerns, limited insight into disease process and medications  Strengths: Appears willing to accept support  Date to achieve by: 8/19/20  Patient expressed understanding of goal: Yes    Personal Plan:  1. I will continue to work with Kayla for therapy through [facility] and she can be reached at [phone].  2. I will continue to work with my [facility] Worker Ivania and she can be reached at [phone].        Mental Health Management 2 (pt-stated)   20%     Note: See Telephone Encounter regarding [facility] call, they are going to wait until they get DA records from [facility] before scheduling.    Goal statement: I would like to schedule an N-648 waiver evaluation and neuropsychological testing through [facility] within the next 90 days.    Date goal set: 6/19/20  Barriers: Language barrier, poor historian, memory issues/concerns, limited insight into disease process and medications  Strengths: Appears willing to accept support  Date to achieve by: 9/19/20  Patient expressed understanding of goal: Yes    Action steps to achieve this goal:  1.  I will answer the phone when [facility] contacts me to schedule an appointment.  2.  I will attend my appointment with [facility] as scheduled.  3.  I will communicate with CHW at [facility].    NOTE: Message sent to [facility] on 6/25 to make sure they rcvd the [facility] referral into their WQ that Dr. Ortiz wrote.              CHW Next Follow-up: 8/28/2020    CHW Plan: Outreach as scheduled    Hsa FREDA Russo reports he does not like that  he can't reach his [facility] worker asap, in other words, he wants her to answer her phone when he calls even though that is not how [facility] services works. Kayla is working with him on better understanding this.    [facility] goal is still pending.

## 2021-06-10 NOTE — PROGRESS NOTES
"Pablo Russo is a 39 y.o. male here for follow up diabetes    ASSESSMENT/PLAN:   Pablo was seen today for diabetes and medication management.    Diagnoses and all orders for this visit:    Type 2 diabetes mellitus without complication, with long-term current use of insulin (H)  -     Glucose  -     pen needle, diabetic (BD ULTRA-FINE MAHSA PEN NEEDLE) 32 gauge x 5/32\" Ndle; Inject 1 each under the skin daily.  -     insulin glargine (LANTUS SOLOSTAR U-100 INSULIN) 100 unit/mL (3 mL) pen; Inject 10 Units under the skin at bedtime.  -     Microalbumin, Random Urine  -      DIABETES FOOT EXAM      Medication nonadherence  Has been referred to care coordination before but canceled because he worked too much. He declined again, but not currently working (see subjective below). Right now he does not have a glucometer, not sure what his medications are for, says he ran out of insulin pen needles and other medications. Has not been to the pharmacy to get refills.  I'm not quite sure how to help him with his medications and symptoms yet, but we will continue to work on things. Will refer to MTM to help with medications. Will also discuss home health or care coordination again when we can determine what exactly he needs.       No follow-ups on file.       ======================================================    SUBJECTIVE  Pablo Russo is a 39 y.o. male here for follow up diabetes.     Patient is nonadherent due to his work hours, possible illiteracy, and possible depression?   He is not very forthright with his mood.   Recently let go from his job he says because of his frequent ER visits, the last one was for migraines that have now resolved. BMP done at last visit showed controlled blood glucose    Has been referred to care coordination before but canceled because he worked too much. He declined again, but not currently working (see subjective below). Threw away his glucometer because he said it stopped working, has not checked " "glucose in a while because of that, was prescribed a new one when he went to the ER but has not tried to pick it up. Right now he does not have a glucometer, not sure what his medications are for, says he ran out of insulin pen needles and other medications. Has not been to the pharmacy to get refills.       ROS  Complete 10 point review of systems negative except as noted above in HPI    Reviewed Past Medical History, Medications, Family History and Social History in Epic and up to date with no new changes.    OBJECTIVE  /68 (Patient Site: Right Arm, Patient Position: Sitting, Cuff Size: Adult Regular)   Pulse 89   Temp 98.2  F (36.8  C) (Oral)   Ht 5' 1.25\" (1.556 m)   Wt 131 lb 6.4 oz (59.6 kg)   SpO2 98%   BMI 24.63 kg/m       General: Cooperative, pleasant, in no acute distress  HEENT: PERRL, EOMI.    Neck: no lymphadenopathy, no masses  CV: RRR, normal S1/S2, no murmur, rubs, gallops  Resp: No respiratory distress. Clear to auscultation bilaterally. No wheezes, rales, rhonchi  Abd: Nontender, nondistended, bowel sounds present  Ext: radial/pedal pulses +2 bilaterally  MSK: Normal muscle tone  Neuro: CN II-XII intact. microfilament test 5/5 bilaterally  Skin: warm, well perfused. No rashes  Psych: No suicidal or homicidal ideations, no self-harm.  Normal affect.    LABS & IMAGES   Results for orders placed or performed in visit on 08/21/20   Glucose   Result Value Ref Range    Glucose 178 (H) 74 - 125 mg/dL    Patient Fasting > 8hrs? No          ======================================================    Visit was completed along with Radha ham    Options for treatment and follow-up care were reviewed with the patient. Hsa M Karan and/or guardian was engaged and actively involved in the decision making process. Hsa M Karan and/or guardian verbalized understanding of the options discussed and was satisfied with the final plan.      Peggy Ortiz MD        "

## 2021-06-10 NOTE — TELEPHONE ENCOUNTER
Medication Question or Clarification  Who is calling: Patient and interrupter Nikko [facility]  What medication are you calling about (include dose and sig)?:   metFORMIN (GLUCOPHAGE) 1000 MG tablet  1,000 mg, Oral, 2 times daily         Summary: Take 1 tablet (1,000 mg total) by mouth 2 (two) times a day., Starting Mon 6/15/2020, Until Tue 6/15/2021, Normal        olopatadine (PATANOL) 0.1 % ophthalmic solution  1 drop, Both Eyes, 2 times daily         Summary: Administer 1 drop to both eyes 2 (two) times a day., Starting Fri 7/3/2020, Normal          Who prescribed the medication?:Peggy Ortiz MD  What is your question/concern?: Need refills  Writer shared the orders are at pharmacy.  Writer shared the patient  just needs to ask them for the medications.  If any issues have pharmacy call clinic directly so orders can be read.  No further questions.   Requested Pharmacy: [facility]3/2020  Okay to leave a detailed message?: No   call back needed.

## 2021-06-10 NOTE — PROGRESS NOTES
Pablo Russo is a 39 y.o. male here for follow up back pain    ASSESSMENT/PLAN:   Pablo was seen today for back pain and nausea.    Diagnoses and all orders for this visit:    Muscle spasm  Patient works 8 to 12 hours a day, 5 to 6 days/week.  Significant paraspinous muscle tension on exam, no alarm symptoms.  No indication for imaging at this time.  Will treat with NSAID and muscle relaxant at night.  If no improvement, consider physical therapy.  -     cyclobenzaprine (FLEXERIL) 5 MG tablet; Take 1 tablet (5 mg total) by mouth at bedtime as needed for muscle spasms.  -     naproxen (NAPROSYN) 500 MG tablet; Take 1 tablet (500 mg total) by mouth 2 (two) times a day with meals.    Nausea  Patient having 4-5 bowel movements per day for over 2 weeks. COVID negative in the ER this week. He does no drink any water at work because he does not want to use the bathroom multiple times per day. BMP stable from ER visit. Recommend treating nausea, possible viral illness vs volume depletion (dry on exam). Possible medication side effect from metformin? Will have patient bring all medications next week.   -     ondansetron (ZOFRAN-ODT) 4 MG disintegrating tablet; Take 1 tablet (4 mg total) by mouth every 8 (eight) hours as needed for nausea.          Return in about 1 week (around 8/20/2020) for abdominal and back pain, bring all medications.       ======================================================    SUBJECTIVE  Pablo Russo is a 39 y.o. male here for ER follow up, nausea, back pain.     Back pain is a pressure. Just in low back, no radiation. He thinks it is nerve pain.   He is working a lot, 8 hours per day - 5 days per week.   Same on days off.   Worse with heavy lifting.   Not worse at night.     Poor appetite. Doesn't drink water because he doesn't want to have to use the bathroom multiple times at work.     Diabetes controlled, patient on metformin and insulin.     ROS  Complete 10 point review of systems negative except as  noted above in HPI    Reviewed Past Medical History, Medications, Family History and Social History in Epic and up to date with no new changes.    OBJECTIVE  /60   Pulse 86   Temp 98.2  F (36.8  C) (Oral)   Resp 16   Wt 132 lb (59.9 kg)   SpO2 97%      General: Cooperative, pleasant, in no acute distress  HEENT: PERRL, EOMI.  TM normal bilaterally.  Pharynx normal without erythema.  Tonsils normal without hypertrophy or exudates.  Neck: no lymphadenopathy, no masses  CV: RRR, normal S1/S2, no murmur, rubs, gallops  Resp: No respiratory distress. Clear to auscultation bilaterally. No wheezes, rales, rhonchi  Abd: Nontender, nondistended, bowel sounds present  Ext: radial/pedal pulses +2 bilaterally  MSK: Normal muscle tone. paraspinous muscle tension in thoracic and lumbar regions bilaterally. Full forward flexion and extension of back without any issues.   Neuro: CN II-XII intact. Patellar and achilles DTR +2 bilaterally.   Skin: warm, well perfused. No rashes  Psych: No suicidal or homicidal ideations, no self-harm.  Normal affect.    LABS & IMAGES   Results for orders placed or performed in visit on 07/10/20    DIABETES EYE EXAM   Result Value Ref Range    RETINOPATHY SEE SCANNED DOCUMENT          ======================================================    Visit was completed along with Radha     Options for treatment and follow-up care were reviewed with the patient. Hsa M Karan and/or guardian was engaged and actively involved in the decision making process. Hsa M Karan and/or guardian verbalized understanding of the options discussed and was satisfied with the final plan.      Peggy Ortiz MD

## 2021-06-10 NOTE — PROGRESS NOTES
Clinic Care Coordination Contact    Community Health Worker Follow Up    Goals:     Goals Addressed                 This Visit's Progress       Patient Stated      Mental Health Management 2 (pt-stated)        Goal statement: I would like to schedule an N-648 waiver evaluation and neuropsychological testing through [facility] within the next 90 days.    Date goal set: 6/19/20  Barriers: Language barrier, poor historian, memory issues/concerns, limited insight into disease process and medications  Strengths: Appears willing to accept support  Date to achieve by: 9/19/20  Patient expressed understanding of goal: Yes    Action steps to achieve this goal:  1.  I will sign and return the PERFECTO [facility] mailed to me so they can obtain records from [facility].                  CHW Outreach Conversation:    Pablo Russo has been meeting with his [facility] worker now and he states that he understands he has an PERFECTO from [facility] he needs to sign and return to [facility] so they can get records from [facility].    Message was sent to the [facility] worker to let her know the update and need for PERFECTO to be returned so Pablo Russo can have his N648 and neuropsych evaluation.    Pablo Russo will be able to schedule his [facility] appointment once [facility] provider can review the Diagnostic Assessment from [facility].      CHW Next Follow-up: 9/28/2020    CHW Plan: Outreach as scheduled and contact [facility] to find out if they rcvd the PERFECTO

## 2021-06-10 NOTE — TELEPHONE ENCOUNTER
The ondansetron ODT 4mg tabs are not covered by pt insurance.  Please either begin a PA or send an alternate to pharmacy.  Thanks.

## 2021-06-11 NOTE — PROGRESS NOTES
Clinic Care Coordination Contact    Community Health Worker Follow Up    Goals:     NONE      CHW Outreach Conversation:    Hsa Karan's goal has been deleted as he will not be able to schedule an N-648 appointment with [facility] due to legal concerns. He reports he is aware of this and so is his [facility] worker and she will help him with next steps.      See Email from [facility] Below:      [facility] <[email or web address]>  Fri 9/25/2020 1:39 PM  Hi Frances Tothalexandre 1980, we received records from [facility]. After reviewing, due to his legal history, we are recommending him to consult with an  or  before proceeding with any immigration paperwork. I'm not sure if he has, I spoke with Ivania ([facility] worker) earlier today and she said that is on a to do list because she is also aware of his legal history, but she hasn't been able to reach him. I wanted to inform you we will hold off on the referral until he consults with an /.    Thanks,    --   Cole Obregon  [facility], [facility]  [address]  [place] [address]  Phone: [phone]  Fax: [phone]  [email or web address]        CHW Next Follow-up: TBD    CHW Plan: Routing to [facility] SW to review for Maintenance

## 2021-06-11 NOTE — PROGRESS NOTES
"Clinic Care Coordination Contact     Situation: Pt chart reviewed by SW care coordinator.     Background:   - Most recent SW assessment completed on 6/19/20.  - Pt initially enrolled to be referred for ARMHS/in-home therapy through [facility] and an N-648 waiver evaluation through [facility].  - Pt had an initial MEV appointment (via phone) with CCC RN on 6/29/20. He was accepting of a referral to home care at that time after reporting that he does not know how to check his blood sugar, set up medications correctly, etc.  - Home care referral was forwarded to [employer]. due to [facility] being at capacity. Per 7/2/20 telephone encounter, pt was almost immediately discharged from home care due to being unavailable for med teaching, set up, etc. during business hours.  - MICHELLE and Kayla from [facility] discussed pt's situation at length due to discrepancies and concerns surrounding citizenship, green card, etc (see original  assessment for further detail). During this conversation, Kayla reported that [facility] would be assisting pt with his citizenship questions/concerns moving forward.   - Pt was seen in ED on 8/11/20 and 8/17/20 for nausea/headache and generally feeling unwell. He followed up with PCP in clinic regarding these concerns on 8/13/20 and 8/21/20. Most recently, he was seen by Dr. Henriquez on 9/11/20 to discuss management of chronic nausea.  - [facility] on 6/19/20 per request of [facility] MICHELLE. After reviewing medical records and DA completed by [facility], [facility] not feel that [facility] met requirements for medical necessity (re: N-648 waiver). Ultimately, [facility] has recommended that pt consult with  before proceeding any further with immigration/citizenship given his \"legal history.\" Of note, pt has never reported or disclosed legal problems of any kind to CCC " staff.  - Last outreach (incoming email from [facility]) by CHW on 9/25/20 contained the recommendations detailed above. [facility] also confirmed that pt's [facility] worker, Ivania, is aware of their recommendations.   - Of note, ARM worker (Ivania) reports recent difficulty getting ahold of pt to f/u on a regular basis. If he continues to be unreachable, [facility] will likely discharge him from their services.  - Moving forward, pt will need to work with his [facility] worker and appointed  (if he chooses to proceed with above recommendations) regarding citizenship matters and navigating other community resources. [facility] team cannot offer anything additional at this time.     Assessment: All previous goals completed. Episode resolved. Enrollment status adjusted. CCC team members removed from Care Team.     Plan/Recommendations:   1.) Pt will be immediately graduated from [facility] at this time (maintenance override).

## 2021-06-11 NOTE — PROGRESS NOTES
ASSESSMENT and plan   1. Depressive disorder due to another medical condition with depressive features  He has not been sleeping regularly because he does not do Zoloft we called to the pharmacy to make sure that he will receive the medication I printed a prescription for him  - sertraline (ZOLOFT) 50 MG tablet; Take 1 tablet (50 mg total) by mouth daily.  Dispense: 90 tablet; Refill: 3    2. Type 2 diabetes mellitus with hyperosmolarity without coma, without long-term current use of insulin (H)    He does have access to syringes however he has not been checking his blood sugars he does not have any access to his insulin regimen Metformin he has not taken the medications for 1 month I have refilled his medications and given him a paper prescription    3. Primary insomnia    As mentioned difficulty sleeping he notes no daytime somnolence    4. Type 2 diabetes mellitus without complication, with long-term current use of insulin (H)  \  - atorvastatin (LIPITOR) 20 MG tablet; Take 1 tablet (20 mg total) by mouth daily.  Dispense: 90 tablet; Refill: 3  - metFORMIN (GLUCOPHAGE) 1000 MG tablet; Take 1 tablet (1,000 mg total) by mouth 2 (two) times a day.  Dispense: 180 tablet; Refill: 3  - insulin glargine (LANTUS SOLOSTAR U-100 INSULIN) 100 unit/mL (3 mL) pen; Inject 10 Units under the skin at bedtime.  Dispense: 9 mL; Refill: 3    5. Insomnia, unspecified type    - traZODone (DESYREL) 50 MG tablet; Take 1 tablet (50 mg total) by mouth at bedtime.  Dispense: 90 tablet; Refill: 3    6. Nausea  He has been nauseated I refilled his Reglan he understands that this has side effects.  - metoclopramide (REGLAN) 10 MG tablet; Take 1 tablet (10 mg total) by mouth 4 (four) times a day.  Dispense: 30 tablet; Refill: 0    7. Need for influenza vaccination    - Influenza, Seasonal Quad, PF =/> 6months        There are no Patient Instructions on file for this visit.    Orders Placed This Encounter   Procedures     Influenza, Seasonal  "Quad, PF =/> 6months     Medications Discontinued During This Encounter   Medication Reason     atorvastatin (LIPITOR) 20 MG tablet Reorder     metoclopramide (REGLAN) 10 MG tablet Reorder     metFORMIN (GLUCOPHAGE) 1000 MG tablet Reorder     sertraline (ZOLOFT) 50 MG tablet Reorder     traZODone (DESYREL) 50 MG tablet Reorder     insulin glargine (LANTUS SOLOSTAR U-100 INSULIN) 100 unit/mL (3 mL) pen Reorder       No follow-ups on file.    CHIEF COMPLAINT:  Chief Complaint   Patient presents with     Medicine check       HISTORY OF PRESENT ILLNESS:  Pablo is a 39 y.o. male   Who is here for a follow-up he is a type II diabetic with insomnia hyperlipidemia.  He reports that he has not been able to get his medications from the pharmacy.  He has not taken his medications for more than 1 month.  He says he is gone to the pharmacy repeated times and they told her no prescriptions are available.  He has not been checking his blood sugars.  Sometimes he feels weak and tired he is been trying to manage his diet he also has not been sleeping well because he is out of the medications for sleep and depression.  He states that he has not noticed any symptoms suggestive of frequent urination.  No dizzy spells are noted but he deals feel nauseous and that is affected his appetite.    REVIEW OF SYSTEMS:     General feels slightly fatigued  GI feels nauseated  Psych has difficulty sleeping  12 point review of  All other systems are negative.    PFSH:    Social history reviewed    TOBACCO USE:  Social History     Tobacco Use   Smoking Status Former Smoker   Smokeless Tobacco Never Used       VITALS:  Vitals:    09/25/20 1411   BP: 106/72   Pulse: 80   Resp: 20   Temp: 98  F (36.7  C)   TempSrc: Oral   Weight: 134 lb 7 oz (61 kg)   Height: 5' 1.25\" (1.556 m)     Wt Readings from Last 3 Encounters:   09/25/20 134 lb 7 oz (61 kg)   09/11/20 133 lb 1 oz (60.4 kg)   08/21/20 131 lb 6.4 oz (59.6 kg)       PHYSICAL EXAM:  Interactive male " sitting comfortably exam no acute distress  HEENT neck supple mucous members moist oral cavity shows no exudate no erythema  Respiratory system clear to auscultation equal breath sounds no wheeze no crackles  CVS regular rate and rhythm no murmurs rubs gallops appreciated  Psych oriented x3 not agitated well-groomed  CNS cranial nerves II to XII intact gait is normal reflexes are brisk    DATA REVIEWED:  Additional History from Old Records Summarized (2): 0  Decision to Obtain Records (1): 0  Radiology Tests Summarized or Ordered (1): 0  Labs Reviewed or Ordered (1): 0  Medicine Test Summarized or Ordered (1): 0  Independent Review of EKG or X-RAY(2 each): 0    The visit lasted a total of 25 minutes face to face with the patient. Over 50% of the time was spent counseling and educating the patient about   Diabetes insomnia hyperlipidemia and depression.  We did call the pharmacy and explained the process of obtaining refills he will take the paper or hardcopy medication prescription to the pharmacy.    MEDICATIONS:  Current Outpatient Medications   Medication Sig Dispense Refill     ACCU-CHEK FASTCLIX LANCET DRUM USE 1 EACH TO TEST DAILY.       ACCU-CHEK GUIDE TEST STRIPS strips USE TO TEST BLOOD GLUCLOSE ONCE D UTD       acetaminophen (TYLENOL) 500 MG tablet Take 1-2 tablets (500-1,000 mg total) by mouth every 6 (six) hours as needed for pain. 50 tablet 0     atorvastatin (LIPITOR) 20 MG tablet Take 1 tablet (20 mg total) by mouth daily. 90 tablet 3     blood-glucose meter Misc        cetirizine (ZYRTEC) 10 MG tablet Take 1 tablet (10 mg total) by mouth daily. 30 tablet 2     cyclobenzaprine (FLEXERIL) 5 MG tablet Take 1 tablet (5 mg total) by mouth at bedtime as needed for muscle spasms. 30 tablet 1     insulin glargine (LANTUS SOLOSTAR U-100 INSULIN) 100 unit/mL (3 mL) pen Inject 10 Units under the skin at bedtime. 9 mL 3     metFORMIN (GLUCOPHAGE) 1000 MG tablet Take 1 tablet (1,000 mg total) by mouth 2 (two)  "times a day. 180 tablet 3     metoclopramide (REGLAN) 10 MG tablet Take 1 tablet (10 mg total) by mouth 4 (four) times a day. 30 tablet 0     naproxen (NAPROSYN) 500 MG tablet TAKE 1 TABLET(500 MG) BY MOUTH TWICE DAILY WITH MEALS 60 tablet 0     olopatadine (PATANOL) 0.1 % ophthalmic solution Administer 1 drop to both eyes 2 (two) times a day. 5 mL 3     ondansetron (ZOFRAN) 4 MG tablet Take 1 tablet (4 mg total) by mouth daily as needed for nausea. 30 tablet 1     pen needle, diabetic (BD ULTRA-FINE MAHSA PEN NEEDLE) 32 gauge x 5/32\" Ndle Inject 1 each under the skin daily. 100 each 3     sertraline (ZOLOFT) 50 MG tablet Take 1 tablet (50 mg total) by mouth daily. 90 tablet 3     sildenafil (REVATIO) 20 mg tablet Take 20 mg by mouth.       traZODone (DESYREL) 50 MG tablet Take 1 tablet (50 mg total) by mouth at bedtime. 90 tablet 3     No current facility-administered medications for this visit.      Kayleigh HUDSON    "

## 2021-06-11 NOTE — TELEPHONE ENCOUNTER
"Patient is calling because the pharmacy did not receive the medication orders from 9/25/20.     ELISA reviewed chart and found that MD listed status as \"print\" and this may have been in error.     Can covering provider signed queued orders for e-signature and CMT will call pharmacy to refuse all paper scripts if brought in as this order would replace any paper orders fro 9/25/20 and will also update the patient. Thanks.     See PCP documentation from 09/25/20 below.     ASSESSMENT and plan   1. Depressive disorder due to another medical condition with depressive features  He has not been sleeping regularly because he does not do Zoloft we called to the pharmacy to make sure that he will receive the medication I printed a prescription for him  - sertraline (ZOLOFT) 50 MG tablet; Take 1 tablet (50 mg total) by mouth daily.  Dispense: 90 tablet; Refill: 3     2. Type 2 diabetes mellitus with hyperosmolarity without coma, without long-term current use of insulin (H)     He does have access to syringes however he has not been checking his blood sugars he does not have any access to his insulin regimen Metformin he has not taken the medications for 1 month I have refilled his medications and given him a paper prescription     3. Primary insomnia     As mentioned difficulty sleeping he notes no daytime somnolence     4. Type 2 diabetes mellitus without complication, with long-term current use of insulin (H)  \  - atorvastatin (LIPITOR) 20 MG tablet; Take 1 tablet (20 mg total) by mouth daily.  Dispense: 90 tablet; Refill: 3  - metFORMIN (GLUCOPHAGE) 1000 MG tablet; Take 1 tablet (1,000 mg total) by mouth 2 (two) times a day.  Dispense: 180 tablet; Refill: 3  - insulin glargine (LANTUS SOLOSTAR U-100 INSULIN) 100 unit/mL (3 mL) pen; Inject 10 Units under the skin at bedtime.  Dispense: 9 mL; Refill: 3     5. Insomnia, unspecified type     - traZODone (DESYREL) 50 MG tablet; Take 1 tablet (50 mg total) by mouth at bedtime.  " Dispense: 90 tablet; Refill: 3     6. Nausea  He has been nauseated I refilled his Reglan he understands that this has side effects.  - metoclopramide (REGLAN) 10 MG tablet; Take 1 tablet (10 mg total) by mouth 4 (four) times a day.  Dispense: 30 tablet; Refill: 0     7. Need for influenza vaccination     - Influenza, Seasonal Quad, PF =/> 6months

## 2021-06-11 NOTE — TELEPHONE ENCOUNTER
RN cannot approve Refill Request    RN can NOT refill this medication med is not covered by policy/route to provider. Last office visit: 8/21/2020 Peggy Ortiz MD Last Physical: Visit date not found Last MTM visit: Visit date not found Last visit same specialty: 9/11/2020 Cade Henriquez MD.  Next visit within 3 mo: Visit date not found  Next physical within 3 mo: Visit date not found      Taylor Russell, [facility] Triage/Med Refill 9/13/2020    Requested Prescriptions   Pending Prescriptions Disp Refills     naproxen (NAPROSYN) 500 MG tablet [Pharmacy Med Name: NAPROXEN 500MG TABLETS] 60 tablet 0     Sig: TAKE 1 TABLET(500 MG) BY MOUTH TWICE DAILY WITH MEALS       There is no refill protocol information for this order

## 2021-06-11 NOTE — PROGRESS NOTES
ASSESSMENT and plan   1. 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 Type 2 diabetes mellitus with hyperosmolarity without coma, without long-term current use of insulin (H)    Reviewed last A1c with patient rechecking CMP.  Checking urinalysis today  - Comprehensive Metabolic Panel  - Urinalysis-UC if Indicated    3. Nausea    Patient has extreme nausea no emesis Zofran prescribed he ran out of his Reglan a month ago.  It is stable.  - Comprehensive Metabolic Panel  - Urinalysis-UC if Indicated  - ondansetron (ZOFRAN) 4 MG tablet; Take 1 tablet (4 mg total) by mouth daily as needed for nausea.  Dispense: 30 tablet; Refill: 1    4. Paradoxical insomnia    He has been able to sleep he is taking his medication faithfully    5. Lumbar back pain    I have prescribed Tylenol for him.  He notes no referred pain he states that his back pain is been a longstanding problem  - acetaminophen (TYLENOL) 500 MG tablet; Take 1-2 tablets (500-1,000 mg total) by mouth every 6 (six) hours as needed for pain.  Dispense: 50 tablet; Refill: 0      There are no Patient Instructions on file for this visit.    Orders Placed This Encounter   Procedures     Comprehensive Metabolic Panel     Urinalysis-UC if Indicated     Medications Discontinued During This Encounter   Medication Reason     acetaminophen (TYLENOL) 500 MG tablet Reorder       No follow-ups on file.    CHIEF COMPLAINT:  Chief Complaint   Patient presents with     Nausea     Anorexia       HISTORY OF PRESENT ILLNESS:  Pablo is a 39 y.o. male here to follow-up for his nausea, type 2 diabetes and depression.  He regularly sees , he reports he been taking his diabetic medication.  Has not been checking his blood sugars.  He states that for the last 3 days he has been very nauseated and is wanting to vomit and feels he can  "eat he feels he lost weight.  He states that he has been taking all his medications faithfully.  He denies any fever chills or abdominal pain.  He states that he also has back pain this is not related to his nausea he has had that for many years.      REVIEW OF SYSTEMS:     General feels poor appetite because of his nausea.  GI positive for nausea  Musculoskeletal positive for back pain 10 point review of  All other systems are negative.    PFSH:    Social and family history reviewed with the help of certified     TOBACCO USE:  Social History     Tobacco Use   Smoking Status Former Smoker   Smokeless Tobacco Never Used       VITALS:  Vitals:    09/11/20 1006   BP: 98/70   Pulse: 84   Resp: 18   Temp: 98.3  F (36.8  C)   TempSrc: Oral   Weight: 133 lb 1 oz (60.4 kg)   Height: 5' 1.25\" (1.556 m)     Wt Readings from Last 3 Encounters:   09/11/20 133 lb 1 oz (60.4 kg)   08/21/20 131 lb 6.4 oz (59.6 kg)   08/13/20 132 lb (59.9 kg)       PHYSICAL EXAM:    Interactive male seen [facility] exam room in no acute distress  HEENT neck supple mucous membranes moist, there is no lymph enlargement noted in the neck.  Respiratory system clear to auscultation equal breath sounds no wheezes no crackles  CVS regular rate and rhythm no murmurs rubs gallops appreciated  Abdomen soft there is no focal tenderness bowel sounds are present  CNS cranial nerves II to XII intact gait is normal reflexes are brisk  Psych oriented x3 not agitated    DATA REVIEWED:  Additional History from Old Records Summarized (2): 2  Reviewed last clinic note from his primary doctor.  He had no nausea no vomiting.  Labs were discussed with him at that visit.  Reviewed last ED note from [facility] where he was evaluated for migraine.  Decision to Obtain Records (1): 0  Radiology Tests Summarized or Ordered (1): 0  Labs Reviewed or Ordered (1): 1  Medicine Test Summarized or Ordered (1): 0  Independent Review of EKG or X-RAY(2 each): " "0    The visit lasted a total of 22 minutes face to face with the patient. Over 50% of the time was spent counseling and educating the patient about   Nausea, diabetes, depression, insomnia..    MEDICATIONS:  Current Outpatient Medications   Medication Sig Dispense Refill     ACCU-CHEK FASTCLIX LANCET DRUM USE 1 EACH TO TEST DAILY.       ACCU-CHEK GUIDE TEST STRIPS strips USE TO TEST BLOOD GLUCLOSE ONCE D UTD       acetaminophen (TYLENOL) 500 MG tablet Take 1-2 tablets (500-1,000 mg total) by mouth every 6 (six) hours as needed for pain. 50 tablet 0     atorvastatin (LIPITOR) 20 MG tablet Take 1 tablet (20 mg total) by mouth daily. 90 tablet 3     blood-glucose meter Misc        cetirizine (ZYRTEC) 10 MG tablet Take 1 tablet (10 mg total) by mouth daily. 30 tablet 2     cyclobenzaprine (FLEXERIL) 5 MG tablet Take 1 tablet (5 mg total) by mouth at bedtime as needed for muscle spasms. 30 tablet 1     insulin glargine (LANTUS SOLOSTAR U-100 INSULIN) 100 unit/mL (3 mL) pen Inject 10 Units under the skin at bedtime. 9 mL 3     metFORMIN (GLUCOPHAGE) 1000 MG tablet Take 1 tablet (1,000 mg total) by mouth 2 (two) times a day. 180 tablet 3     metoclopramide (REGLAN) 10 MG tablet Take 10 mg by mouth 4 (four) times a day.       naproxen (NAPROSYN) 500 MG tablet Take 1 tablet (500 mg total) by mouth 2 (two) times a day with meals. 60 tablet 0     olopatadine (PATANOL) 0.1 % ophthalmic solution Administer 1 drop to both eyes 2 (two) times a day. 5 mL 3     ondansetron (ZOFRAN) 4 MG tablet Take 1 tablet (4 mg total) by mouth daily as needed for nausea. 30 tablet 1     pen needle, diabetic (BD ULTRA-FINE MAHSA PEN NEEDLE) 32 gauge x 5/32\" Ndle Inject 1 each under the skin daily. 100 each 3     sertraline (ZOLOFT) 50 MG tablet Take 1 tablet (50 mg total) by mouth daily. 90 tablet 3     sildenafil (REVATIO) 20 mg tablet Take 20 mg by mouth.       traZODone (DESYREL) 50 MG tablet Take 1 tablet (50 mg total) by mouth at bedtime. 90 " tablet 3     No current facility-administered medications for this visit.      Kayleigh HUDSON

## 2021-06-11 NOTE — TELEPHONE ENCOUNTER
Question following Office Visit  When did you see your provider: 9/25/2020  What is your question: States he did not receive all his medications that he is taking on the day of office visit.  When asked what they were, he also did not know.  Please call.  Okay to leave a detailed message: No

## 2021-06-12 NOTE — TELEPHONE ENCOUNTER
Patient already updated and can now  all meds and supplies at pharmacy but needs follow up appointment for med check. Thanks.

## 2021-06-12 NOTE — PROGRESS NOTES
Called the pharmacy who said all meds ordered today by provider are ready to  except the Metformin as pt picked up script for 90 days 7/30/20.  Pharmacy asked if pt could bring ID with due to language barrier.      Called pt to relay above. Thanks.

## 2021-06-12 NOTE — TELEPHONE ENCOUNTER
"Patient could not  because it was sent as \"print\" can MD resend as e-prescription. Patient notified it would be resent.   "

## 2021-06-12 NOTE — PROGRESS NOTES
Pablo Russo is a 39 y.o. male here for Follow up    ASSESSMENT/PLAN:   Pablo was seen today for diabetes.    Diagnoses and all orders for this visit:    Type 2 diabetes mellitus with hyperosmolarity without coma, without long-term current use of insulin (H)  Says he he has been without any medications for 3 months but his A1c is significantly improved. He says he pees more lately as if he has diabetes symptoms.   -     Glycosylated Hemoglobin A1c      MEDICATION REFILLS  Refilled by multiple providers in multiple forms, but patient insists he went to the pharmacy there were no medications there for him. Will try to confirm with pharmacy what medications are there for him and if he can get them.     Nausea  -     ondansetron (ZOFRAN) 4 MG tablet; Take 1 tablet (4 mg total) by mouth daily as needed for nausea.    Allergic conjunctivitis, bilateral  -     olopatadine (PATANOL) 0.1 % ophthalmic solution; Administer 1 drop to both eyes 2 (two) times a day.    Muscle spasm  -     naproxen (NAPROSYN) 500 MG tablet; TAKE 1 TABLET(500 MG) BY MOUTH TWICE DAILY WITH MEALS  -     cyclobenzaprine (FLEXERIL) 5 MG tablet; Take 1 tablet (5 mg total) by mouth at bedtime as needed for muscle spasms.    Type 2 diabetes mellitus without complication, with long-term current use of insulin (H)  -     metFORMIN (GLUCOPHAGE) 1000 MG tablet; Take 1 tablet (1,000 mg total) by mouth 2 (two) times a day.    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       ======================================================    SUBJECTIVE  Pablo Russo is a 39 y.o. male here for     He said he want to the pharmacy but they would not give it to him because it was the wrong pharmacy. He also says he has been without medications entirely for almost 3 months.     DM. A1c significantly improved to 6.4, nonfasting sugar today 170.   Should be on insulin, but says he hasn't been.  "    Seen in ER for constipation, now resolved.       ROS  Complete 10 point review of systems negative except as noted above in HPI    Reviewed Past Medical History, Medications, Family History and Social History in Epic and up to date with no new changes.    OBJECTIVE  /80   Pulse 86   Temp 98  F (36.7  C) (Oral)   Resp 20   Ht 5' 1.25\" (1.556 m)   Wt 130 lb (59 kg)   SpO2 97%   BMI 24.36 kg/m       General: Cooperative, pleasant, in no acute distress  HEENT: PERRL, EOMI.  TM normal bilaterally.  Pharynx normal without erythema.  Tonsils normal without hypertrophy or exudates.  Neck: no lymphadenopathy, no masses  CV: RRR, normal S1/S2, no murmur, rubs, gallops  Resp: No respiratory distress. Clear to auscultation bilaterally. No wheezes, rales, rhonchi  Abd: Nontender, nondistended, bowel sounds present  Ext: radial/pedal pulses +2 bilaterally  MSK: Normal muscle tone  Neuro: CN II-XII intact  Skin: warm, well perfused. No   Psych: No suicidal or homicidal ideations, no self-harm.  Normal affect.    LABS & IMAGES   Results for orders placed or performed in visit on 10/15/20   Glycosylated Hemoglobin A1c   Result Value Ref Range    Hemoglobin A1c 6.4 (H) <=5.6 %         ======================================================    Visit was completed along with Radha  #[id number]    Options for treatment and follow-up care were reviewed with the patient. Hsa M Karan and/or guardian was engaged and actively involved in the decision making process. Hsa M Karan and/or guardian verbalized understanding of the options discussed and was satisfied with the final plan.      Peggy Ortiz MD        "

## 2021-06-12 NOTE — TELEPHONE ENCOUNTER
Who is calling:  Patient   Reason for Call:  Patient calling regarding prescriptions.  Patient called to ask about his diabetic medication.  There may be confusion between patient, pharmacy and clinic.    Patient states he went to his pharmacy to get his medications but only got the one for cholesterol; but pharmacy did not give him prescription for oral diabetic medicine, metformin.   Patient says his pharmacy is [facility] on [address] .   See some related notes on medication confustion 09/28/20  Please reach out to patient on the metformin prescription, prescription currently listed print.   Date of last appointment with primary care: 10/15/20  Okay to leave a detailed message: No  -- will need to call with Radha ham

## 2021-06-12 NOTE — TELEPHONE ENCOUNTER
Patient said he has not checked his blood glucose in a long time because he has been without supplies. Chart reviewed, multiple providers sent his medications mulitple times in multiple forms (print, electonrically, fax) but he says he has not received them.     I sent in metformin, lantus, lancets, strips, acetaminophen refills just in case.     Please also call patient to schedule follow up with Dr. Ortiz for medication check. Ask patient to bring all his medications with him, including insulin and meter.     Peggy Ortiz MD

## 2021-06-13 NOTE — TELEPHONE ENCOUNTER
DAWN Ortiz. Update from [facility]:    HMDae 12/5/80 is not yet scheduled. I believe I have emailed Janey about this situation but I also spoke with his [facility] worker, [facility]. Due to his past legal problems, we have recommended Mr. Russo to consult with a /. Ivania said she has recommended him to consult legal help before trying to obtain a N648. Ivania will  update our clinic with any additional information and I haven't heard from her or Mr. Russo.

## 2021-06-15 NOTE — TELEPHONE ENCOUNTER
Medication Question or Clarification    Who is calling: [facility] pharmacy fax    What medication are you calling about (include dose and sig)?: Naproxen 500 mg    Who prescribed the medication?: Dr. Green    Last visit: 10/15/20    Pt was in ED on 2/24/21 and NS both apt in March.  Thanks    Call taken: 3/10/21 at 330 pm by Melba Rivas, KARINA, CMT

## 2021-06-15 NOTE — TELEPHONE ENCOUNTER
DM visit 10/15/2020.     Type 2 diabetes mellitus with hyperosmolarity without coma, without long-term current use of insulin (H)  Says he he has been without any medications for 3 months but his A1c is significantly improved. He says he pees more lately as if he has diabetes symptoms.   -     Glycosylated Hemoglobin A1c

## 2021-06-15 NOTE — TELEPHONE ENCOUNTER
Pharmacy contacted office. Asking if there are other orders for refills. I notified Maida that the patient only requested meds as below. Maida states that needles will not be eligible for refill until 3/25/2021 due to early refill. Pharm tech states that patient may have two profiles.     Patient picked up test strips, lancets, and insulin on 3/10/2021. All other orders at pharmacy. Pharmacy tech Maida states that patient was not in the pharmacy to discuss this with (left his ID and meds there at pharmacy). Writer called patient with Radha  to notify him of this and ask him to go back.     Patient states that pharmacy only offered him 2 when MD sent 4 so he left. CMT explained that pharmacy states that patient is not due for needle refill 3/25/2021. Patient picked up insulin and all meds but Zoloft on 3/10/21. Patient asked to call clinic if having issues at pharmacy.

## 2021-06-15 NOTE — TELEPHONE ENCOUNTER
"Reason for Call: medication refill    Do you use a [facility]?  No  Name of the pharmacy and phone number for the current request: [facility], Phone: [phone]    Name of the medication requested:   -pen needle, diabetic (BD ULTRA-FINE MAHSA PEN NEEDLE) 32 gauge x 5/32\" Ndle  -insulin glargine (LANTUS SOLOSTAR U-100 INSULIN) 100 unit/mL (3 mL) pen  -metFORMIN (GLUCOPHAGE) 1000 MG tablet  -sertraline (ZOLOFT) 50 MG tablet    Other request: Pt called pharmacy and was told to contact PCP's office for refills. He's currently out.    Can we leave a detailed message on this number? No call back needed    Phone number patient can be reached at: Cell number on file:    Telephone Information:   Mobile [phone]       Best Time: anytime    Call taken on 3/11/2021 at 4:18 PM by Reese Mohamud    "

## 2021-06-15 NOTE — TELEPHONE ENCOUNTER
RN cannot approve Refill Request    RN can NOT refill this medication med is not covered by policy/route to provider. Last office visit: 10/15/2020 Peggy Ortiz MD Last Physical: Visit date not found Last MTM visit: Visit date not found Last visit same specialty: 10/15/2020 Peggy Ortiz MD.  Next visit within 3 mo: Visit date not found  Next physical within 3 mo: Visit date not found      Veda Hu, [facility] Triage/Med Refill 2/10/2021    Requested Prescriptions   Pending Prescriptions Disp Refills     naproxen (NAPROSYN) 500 MG tablet [Pharmacy Med Name: NAPROXEN 500MG TABLETS] 60 tablet 0     Sig: TAKE 1 TABLET(500 MG) BY MOUTH TWICE DAILY WITH MEALS       There is no refill protocol information for this order

## 2021-06-16 PROBLEM — B18.2 CHRONIC HEPATITIS C WITHOUT HEPATIC COMA (H): Status: ACTIVE | Noted: 2021-05-06

## 2021-06-16 PROBLEM — E11.9 DIABETES MELLITUS, TYPE II (H): Status: ACTIVE | Noted: 2020-02-11

## 2021-06-16 PROBLEM — F32.2 SEVERE MAJOR DEPRESSION WITHOUT PSYCHOTIC FEATURES (H): Status: ACTIVE | Noted: 2020-02-11

## 2021-06-16 PROBLEM — H02.889 MEIBOMIAN GLAND DYSFUNCTION: Status: ACTIVE | Noted: 2021-05-06

## 2021-06-16 PROBLEM — Z91.148 NONCOMPLIANCE WITH MEDICATION REGIMEN: Status: ACTIVE | Noted: 2021-05-06

## 2021-06-16 PROBLEM — G47.00 INSOMNIA: Status: ACTIVE | Noted: 2020-02-18

## 2021-06-16 PROBLEM — G43.909 MIGRAINE: Status: ACTIVE | Noted: 2021-05-06

## 2021-06-16 PROBLEM — R22.9 LUMP OF SKIN: Status: ACTIVE | Noted: 2020-02-11

## 2021-06-16 NOTE — TELEPHONE ENCOUNTER
RN cannot approve Refill Request    RN can NOT refill this medication med is not covered by policy/route to provider. Last office visit: 3/18/2021 Peggy Ortiz MD Last Physical: Visit date not found Last MTM visit: Visit date not found Last visit same specialty: 3/18/2021 Peggy Ortiz MD.  Next visit within 3 mo: Visit date not found  Next physical within 3 mo: Visit date not found      Veda Hu, [facility] Triage/Med Refill 4/18/2021    Requested Prescriptions   Pending Prescriptions Disp Refills     naproxen (NAPROSYN) 500 MG tablet [Pharmacy Med Name: NAPROXEN 500MG TABLETS] 60 tablet 0     Sig: TAKE 1 TABLET(500 MG) BY MOUTH TWICE DAILY WITH MEALS       There is no refill protocol information for this order

## 2021-06-16 NOTE — PROGRESS NOTES
"Pablo Russo is a 40 y.o. male here for med refill, discuss citizenship waiver.     ASSESSMENT/PLAN:   Pablo was seen today for citizenship waiver, medication refill and back pain.    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 Denies any drug use, declined any testing. Sertraline helping with his mood, but this bout of anxiety is related to his citizenship. He said the last time someone called him was over a month ago. With everything happening in [facility] for the past 1-2 months, he is even more worried about this. Will discuss with CCC to determine if we can get him for neuropsych eval. Denies any thoughts of self harm.   -     PHQ9 Depression Screen  -     QUEtiapine (SEROQUEL) 50 MG tablet; Take 1 tablet (50 mg total) by mouth at bedtime.  - Continue sertraline    Type 2 diabetes mellitus without complication, with long-term current use of insulin (H)  Patient says he has insulin at home but no needles to take the insulin. He does not remember the last time he took it. Seen at ER for hyperglycemia less than a month ago.   -     insulin glargine (LANTUS SOLOSTAR U-100 INSULIN) 100 unit/mL (3 mL) pen; Inject 10 Units under the skin at bedtime.  -     atorvastatin (LIPITOR) 20 MG tablet; Take 1 tablet (20 mg total) by mouth daily.  -     ACCU-CHEK GUIDE TEST STRIPS strips; Use 1 each As Directed daily. Dispense brand per patient's insurance at pharmacy discretion.  -     ACCU-CHEK FASTCLIX LANCET DRUM; USE 1 EACH TO TEST DAILY.  -     metFORMIN (GLUCOPHAGE) 1000 MG tablet; Take 1 tablet (1,000 mg total) by mouth 2 (two) times a day.  -     pen needle, diabetic (BD ULTRA-FINE MAHSA PEN NEEDLE) 32 gauge x 5/32\" Ndle; Inject 1 each under the skin daily.          Return in about 2 weeks (around 4/1/2021) for follow up meds, mood. .   Check labs at next visit, patient refused today.   We will " "look into the citizenship waiver for him and notify him of the next steps.     ======================================================    SUBJECTIVE  Pablo Russo is a 40 y.o. male here for insomnia.     Patient is asking about a citizenship waiver.   [facility] on this, will review with them.     He says his anxiety has been terrible in the past 1-2 months, he worries about getting citizenship and it prevents him from sleeping. He was up all night crying, worrying, can't stop thinking about being deported or not being able to get citizenship. With everything happening in [place] right now, he has been having worse PTSD.     Does not have needles to give himself insulin. He has had issues taking his insulin in the past due to low health literacy, but was doing well for some time. He refused any labs today but will consider them next visit. Last a1c in 10/2020 was 6.4    ROS  Complete 10 point review of systems negative except as noted above in HPI    Reviewed Past Medical History, Medications, Family History and Social History in Epic and up to date with no new changes.    OBJECTIVE  /80   Pulse 80   Temp 98  F (36.7  C) (Oral)   Resp 16   Ht 5' 1.25\" (1.556 m)   Wt 130 lb (59 kg)   SpO2 97%   BMI 24.36 kg/m       General: Cooperative, pleasant. Appears disheveled, puffy eyes, tearful, anxious.   HEENT: PERRL, EOMI.  TM normal bilaterally.  Pharynx normal without erythema.  Tonsils normal without hypertrophy or exudates.  Neck: no lymphadenopathy, no masses  CV: RRR, normal S1/S2, no murmur, rubs, gallops  Resp: No respiratory distress. Clear to auscultation bilaterally. No wheezes, rales, rhonchi  Abd: Nontender, nondistended, bowel sounds present  Ext: radial/pedal pulses +2 bilaterally  MSK: Normal muscle tone  Neuro: CN II-XII intact  Skin: warm, well perfused. No rashes  Psych: No suicidal or homicidal ideations, no self-harm. No auditory or visual hallucinations. " Tearful and extremely anxious.     LABS & IMAGES   Results for orders placed or performed in visit on 10/15/20   Glycosylated Hemoglobin A1c   Result Value Ref Range    Hemoglobin A1c 6.4 (H) <=5.6 %         ======================================================    Visit was completed along with Radha     Options for treatment and follow-up care were reviewed with the patient. Hsa M Karan and/or guardian was engaged and actively involved in the decision making process. Hsa M Karan and/or guardian verbalized understanding of the options discussed and was satisfied with the final plan.      Peggy Ortiz MD

## 2021-06-17 NOTE — PROGRESS NOTES
MTM Consult Encounter    Hsa ARLEY Russo is a 40 y.o. male referred for a clinical pharmacist consult from Peggy Ortiz MD   for Professional telephonic Radha  utilized today.    Discussion: Patient presents today with a brand-new meter, testing supplies, unused Lantus insulin pens, and plenty of pen needles for use.  Patient in need of education on all of these aspects.  MTM pharmacist provided glucometer teaching today and had patient check his blood sugars on his own, blood sugars in clinic today were 185 and 183 fasting.  Patient was able to demonstrate how to check his blood sugars at home and feels confident monitoring his blood sugars once daily on his own.  Of note, patient presents with a contour meter, contour test strips, though has Accu-Chek lancet, will send prescription for Microlet lancets instead which can be used with his new meter.  Also provided patient insulin teaching today.  MTM pharmacist provided insulin education with demonstration pen.  Patient was not able to administer his own first dose of insulin today.  Recommended patient use 10 units of insulin daily for now until follow-up with PCP.    Plan:  1.  MTM provided glucometer teaching today, patient was able to demonstrate use  2.  MTM provided insulin teaching and education today, patient was able to administer his first dose on his own  3.  Sent prescription for Microlet lancets to be used instead of Accu-Chek lancets with patient's new meter    Follow up:   Return for Primary Care.  PRN MTM    Charlene Ovalle (Fredis), PharmD, BCACP  Medication Therapy Management Pharmacist  [facility]     Medication Therapy Recommendations  Diabetes mellitus, type II (H)    Current Medication: insulin glargine (LANTUS SOLOSTAR U-100 INSULIN) 100 unit/mL (3 mL) pen   Rationale: Does not understand instructions - Adherence - Adherence   Recommendation: Provide Education - teach insulin and meter use today   Status:  "Patient Agreed - Adherence/Education           Current Outpatient Medications   Medication Sig Dispense Refill     ACCU-CHEK FASTCLIX LANCET DRUM USE 1 EACH TO TEST DAILY. 100 each 3     ACCU-CHEK GUIDE TEST STRIPS strips Use 1 each As Directed daily. Dispense brand per patient's insurance at pharmacy discretion. 100 strip 3     atorvastatin (LIPITOR) 20 MG tablet Take 1 tablet (20 mg total) by mouth daily. 90 tablet 3     blood-glucose meter Misc Use 1 application As Directed daily for 1 day. 1 each 0     famotidine (PEPCID) 20 MG tablet Take 1 tablet (20 mg total) by mouth 2 (two) times a day. 180 tablet 0     fluticasone propionate (FLONASE) 50 mcg/actuation nasal spray 2 sprays into each nostril daily. 18 g 11     generic lancets Use 1 each As Directed daily. Dispense microlet lancets to use with his new Contour Next meter please - recently received accu-chek lancets which do not work with new meter 100 each 6     insulin glargine (LANTUS SOLOSTAR U-100 INSULIN) 100 unit/mL (3 mL) pen Inject 20 Units under the skin at bedtime. 18 mL 3     meloxicam (MOBIC) 15 MG tablet Take 1 tablet (15 mg total) by mouth daily. 90 tablet 3     metFORMIN (GLUCOPHAGE) 1000 MG tablet Take 1 tablet (1,000 mg total) by mouth 2 (two) times a day. 180 tablet 3     olopatadine (PATANOL) 0.1 % ophthalmic solution Administer 1 drop to both eyes 2 (two) times a day. 5 mL 3     pen needle, diabetic (BD ULTRA-FINE MAHSA PEN NEEDLE) 32 gauge x 5/32\" Ndle Inject 1 each under the skin daily. 100 each 3     QUEtiapine (SEROQUEL) 50 MG tablet Take 1 tablet (50 mg total) by mouth at bedtime. 90 tablet 3     sertraline (ZOLOFT) 50 MG tablet Take 1 tablet (50 mg total) by mouth daily. 90 tablet 3     sildenafil (REVATIO) 20 mg tablet Take 20 mg by mouth.       No current facility-administered medications for this visit.                         "

## 2021-06-17 NOTE — TELEPHONE ENCOUNTER
Already forwarded the request to Dr. Henriquez for the meds.  Unsure if PCP wishes pt to have Motrin or naproxyn for his back pain.      CMT waiting for call back from insurance.  Thanks .

## 2021-06-17 NOTE — TELEPHONE ENCOUNTER
Unsure why insurance mandated that Dr. Henriquez is PCP.   Will forward to Dr. Henriquez to review.     If unable to change PCP with insurance, patient can consider scheduling appointment with Dr. Henriquez.     Of note, patient also has appointment with Dr. Dodd on 5/7/2021 and he has been to [facility] a few times in the past month but no showed his appointments with claudio.       Peggy Ortiz MD

## 2021-06-17 NOTE — TELEPHONE ENCOUNTER
TC enter and submit MRRP referral:    TC call RP again and spoke to Carlyn [facility] presentatives. Carlyn states patient was placed on MRRP on 4/21/21. Provider selection letter was sent to patient to choose PCP. RP program did not receive form back so they place patient with Dr. Henriquez. Patient's PCP is Dr. Ortiz. Patient is on 90 day restriction with Dr. Henriquez unless provider submits a valid dismissal letter or patient move 30 miles away.  After 90 days patient can complete a new provider selection form with Dr. Ortiz listed as PCP.    TC submit MRRP Prescribing Privileges for PCP partners - Dr. Ortiz in Clinic form signed by PCP Dr. Henriquez and faxed to [facility] [phone].  Referral length 5/14/21 to 5/14/22  Number of visits: unlimited    Care manage team will need to help patient call [facility] after 90 days and complete provider selection form with Dr. Ortiz listed as PCP. Patient needs to sign, date and return to MRRP program.

## 2021-06-17 NOTE — TELEPHONE ENCOUNTER
Please see MD note below. Sent by Dr. Ortiz today 05/07/2021 (patient scheduled with Dr. Ortiz 05/20/2021).     Patient is going to different providers and telling confusing stories about his situation. He was last seen in clinic by me on 3/18 and was mentally unstable, not sleeping for several days, appearing disheveled with blood shot eyes and poor hygiene which is not his baseline, and saying he needs his citizenship approved which is why he has not slept in 3-4 days. See previous notes for details. He did not come in for his scheduled follow up less than 2 weeks later, an appointment scheduled with the patient before he left that day. He went to the ER instead.     He told the ER yesterday that he could not get in to see PCP at [facility] until June, which is a lie. He is going to multiple ERs in both [facility] and [place] asking for his medications and saying he is restricted to Dr. Henriquez, who refilled requested medications yesterday and then made an appointment for Dr. Rosenberg to fill them as well.         Patient is unstable and has questionable mental health status. He needs to be seen in clinic, we do have openings before June.      Will forward to support staff to help patient schedule.      Peggy Ortiz MD

## 2021-06-17 NOTE — TELEPHONE ENCOUNTER
Reason for Call:  Other prescription     Detailed comments: Pharmacy stated that patient is in clinic and they don't know why patient is needing due to language barrier, caller is needing for nurse to call patient with  to see what is needed.     Phone Number Patient can be reached at: Home number on file [phone] (home)    Best Time: as soon as possible     Can we leave a detailed message on this number?: Yes    Call taken on 5/10/2021 at 1:18 PM by Aviva Romano

## 2021-06-17 NOTE — PROGRESS NOTES
Pablo Russo is a 40 y.o. male here for follow up meds, ER visits    ASSESSMENT/PLAN:   Pablo was seen today for hospital visit follow up.    Diagnoses and all orders for this visit:    Insurance coverage problems  Patient was restricted to Dr. Henriquez, likely due to high ER utilization. He did not understand paperwork sent to him, see previous phone encounters. He will be restricted to Dr. Henriquez for 90 days from when he was assigned, then after 90 days he will be able to fill out paperwork to change PCP to Dr. Ortiz if he wants to. Release form signed so Dr. Ortiz can provide care and prescribe medications. Encouraged patient to call and schedule appointment if he receives insurance/medical paperwork he does not understand.     Type 2 diabetes mellitus without complication, with long-term current use of insulin (H)  -     atorvastatin (LIPITOR) 20 MG tablet; Take 1 tablet (20 mg total) by mouth daily.  -     metFORMIN (GLUCOPHAGE) 1000 MG tablet; Take 1 tablet (1,000 mg total) by mouth 2 (two) times a day.  - Lantus 10u daily  -  Follow up in 2-3 weeks.     REFILL OF CHRONIC MEDICATIONS  Gastroesophageal reflux disease without esophagitis  -     famotidine (PEPCID) 20 MG tablet; Take 1 tablet (20 mg total) by mouth 2 (two) times a day.    Chronic low back pain, unspecified back pain laterality, unspecified whether sciatica present  -     meloxicam (MOBIC) 15 MG tablet; Take 1 tablet (15 mg total) by mouth daily.    Allergic conjunctivitis, bilateral  -     olopatadine (PATANOL) 0.1 % ophthalmic solution; Administer 1 drop to both eyes 2 (two) times a day.    Depressive disorder due to another medical condition with depressive features  Primary insomnia  -     QUEtiapine (SEROQUEL) 50 MG tablet; Take 1 tablet (50 mg total) by mouth at bedtime.  -     sertraline (ZOLOFT) 50 MG tablet; Take 1 tablet (50 mg total) by mouth daily.          Return in about 3 weeks (around 6/10/2021) for Follow up diabetes.  "      ======================================================    SUBJECTIVE  Pablo Russo is a 40 y.o. male here for medication issues, insurance problems.     See assessment/plan for insurance issues.     Diabetes.   Has been without medications due to insurance issues.   A1c checked on 5/6/2021 and was 8.1.     Patient brought in medications today.   - has 2 full lantus pens  - plenty of insulin pen needles (32G, 4mm; AND 35g, 8mm)  - MTM reviewed lancets, new ones sent.   - meter.   - teaching of both meter and insulin done with MTM today    He is requesting refills of all other medications, reviewed with patient.   He has no other immediate concerns, mostly wants his meds for pain.       ROS  Complete 10 point review of systems negative except as noted above in HPI    Reviewed Past Medical History, Medications, Family History and Social History in Epic and up to date with no new changes.    OBJECTIVE  /76   Pulse 81   Temp 98.3  F (36.8  C) (Oral)   Resp 16   Ht 5' 2\" (1.575 m)   Wt 131 lb (59.4 kg)   SpO2 99%   BMI 23.96 kg/m       General: Cooperative, pleasant, in no acute distress  HEENT: PERRL, EOMI.    Neck: no lymphadenopathy, no masses  CV: RRR, normal S1/S2, no murmur, rubs, gallops  Resp: No respiratory distress. Clear to auscultation bilaterally. No wheezes, rales, rhonchi  Abd: Nontender, nondistended, bowel sounds present  Ext: radial/pedal pulses +2 bilaterally  MSK: Normal muscle tone  Neuro: CN II-XII intact  Skin: warm, well perfused. No rashes  Psych: No suicidal or homicidal ideations, no self-harm.  Normal affect.    LABS & IMAGES   Results for orders placed or performed during the hospital encounter of 05/07/21   Blood Gases, Venous   Result Value Ref Range    pH, Venous 7.38 7.35 - 7.45    pCO2, Venous 53 (H) 35 - 50 mm Hg    pO2, Maxi 35 25 - 47 mm Hg    Base Excess, Venous 5.9 mmol/L    HCO3, Venous 27.3 24.0 - 30.0 mmol/L    Oxyhemoglobin 63.9 (L) 70.0 - 75.0 %    O2 Sat, Venous " 64.9 (L) 70.0 - 75.0 %   Beta-hydroxybutyrate (BHOB)   Result Value Ref Range    Beta-Hydroxybutyrate (BHOB) 0.12 <=0.3 mmol/L   Comprehensive Metabolic Panel   Result Value Ref Range    Sodium 137 136 - 145 mmol/L    Potassium 3.8 3.5 - 5.0 mmol/L    Chloride 100 98 - 107 mmol/L    CO2 27 22 - 31 mmol/L    Anion Gap, Calculation 10 5 - 18 mmol/L    Glucose 200 (H) 70 - 125 mg/dL    BUN 10 8 - 22 mg/dL    Creatinine 0.86 0.70 - 1.30 mg/dL    GFR MDRD Af Amer >60 >60 mL/min/1.73m2    GFR MDRD Non Af Amer >60 >60 mL/min/1.73m2    Bilirubin, Total 1.7 (H) 0.0 - 1.0 mg/dL    Calcium 8.7 8.5 - 10.5 mg/dL    Protein, Total 7.7 6.0 - 8.0 g/dL    Albumin 4.2 3.5 - 5.0 g/dL    Alkaline Phosphatase 69 45 - 120 U/L    AST 33 0 - 40 U/L    ALT 48 (H) 0 - 45 U/L   Magnesium   Result Value Ref Range    Magnesium 2.2 1.8 - 2.6 mg/dL   POCT Glucose    Specimen: Blood   Result Value Ref Range    Glucose 346 (H) 70 - 139 mg/dL         ======================================================    Visit was completed along with Radha ham    Options for treatment and follow-up care were reviewed with the patient. Hsa M Karan and/or guardian was engaged and actively involved in the decision making process. Hsa M Karan and/or guardian verbalized understanding of the options discussed and was satisfied with the final plan.      Peggy Ortiz MD

## 2021-06-17 NOTE — TELEPHONE ENCOUNTER
Patient needs to be seen to discuss this. Phone call to address has proven to be ineffective.     There is too much back and forth with multiple pharmacies, social workers, ER providers, clinic providers, and [facility]. I do not think patient knows what he needs and I am not sure who is advising him on where to go.     Tell patient he needs have an appointment to discuss this.   He needs to bring any paperwork stating his restrictions with him.   I am unsure how patient knows that he is restricted to Dr. Henriquez.       Peggy Ortiz MD

## 2021-06-17 NOTE — TELEPHONE ENCOUNTER
Requested Prescriptions      No prescriptions requested or ordered in this encounter     Patient came in today and requested all of his DM medications need to be refilled. Also his medication for anxiety ,back pain and runny nose.   He went to the ED today 5/7/21. They told him to come to the clinic today and make an appt. I did make an ED follow up appointment for him on 5/20/21.

## 2021-06-17 NOTE — TELEPHONE ENCOUNTER
This has been an ongoing issue.   Patient is restricted to Dr. Henriquez, most likely due to his frequent ER trips. See previous phone call encounter.     He was seen by 2 other providers and multiple ER visits just for medication refills and then was unable to fill them because he was restricted.     Now 3 different providers have attempted to fill his medications and he is still asking to have his medications refilled.       Patient needs to be seen in clinic for refills. Scheduled to be seen in 1 week.     Peggy Ortiz MD

## 2021-06-17 NOTE — PROGRESS NOTES
Clinic Care Coordination Contact  Community Health Worker Initial Outreach    CHW Initial Information Gathering:  Referral Source: Other, specify([facility] [place])  Preferred Hospital: Other(No preference)  Preferred Urgent Care: [facility], [phone]  Current living arrangement:: I live in a private home with family  Type of residence:: Private home - stairs  Community Resources: [facility], OP Mental Health(CHW has sent msg to Kayla at [facility] to confirm if he still is working with [facility] and Therapy.)  Supplies Currently Used at Home: Diabetic Supplies  Equipment Currently Used at Home: none  Informal Support system:: Friends  No PCP office visit in Past Year: No  Transportation means:: Medical transport, Other  CHW Additional Questions  MyChart active?: No  Patient agreeable to assistance with activating MyChart?: No    Patient accepts CC: Yes. Patient scheduled for assessment with CCC RN on Wednesday 5/26 at 9am. Patient noted desire to discuss medication management concerns along with not using the healthcare system correctly.     See recent notes in chart from 5/7/21.    CHW was able to confirm with Kayla from [facility] that this patient is still working with and [facility] worker. CHW updated the [facility] worker info in the Snapshot and in the Care Team.

## 2021-06-17 NOTE — TELEPHONE ENCOUNTER
I saw Pablo Russo virtually yesterday and refilled all of his meds. But he ended up in the ER last night because he was unable to fill them because his meds are restricted to Dr. Henriquez.    Please set up all meds for refill and send to Dr. Henriquez. He needs pen needles, but doesn't need testing supplies.    I reviewed the med list and updated everything yesterday, so they just need to be signed by him. I already put in a referral for clinic care coordination to assist with medication access.

## 2021-06-17 NOTE — TELEPHONE ENCOUNTER
Medication Question or Clarification    Who is calling: Patient    What medication are you calling about (include dose and sig)?: Metformin and Ibuprofen.    Who prescribed the medication?: PCP    What is your question/concern?: Pt states he did not receive Metformin on 3/18/21 when prescribed.  There is no Ibuprofen on med list - only Naproxen which is .  The majority of his medications are filled for 90 days with refills for next year.      Called pharmacy who said pt is restricted and insurance changed PCP to Dr. Henriquez.  Pt established care with Dr. Ortiz, PCP on 20 and only saw Dr. Henriquez in  twice.  Pharmacy said did not receive the new Metformin script but has some from last October to give pt.     Called [facility] and attempted to request a change in PCP since insurance changed PCP to Dr. Henriquez and he is the only one who can write for script.  CMT put through to VM.      Is there anyway that SW can assist?  Pt has his meds but the test strips and lancets do not fit his current meter, which pt does not know name of.  Pharmacist states unless PCP is changed to Dr. Ortiz, she can no longer order meds for him.  I was on the phone with insurance for over 30  Minutes and could only leave a VM.  I do not know why this pt is RPR status.     Thanks.     Requested Pharmacy: [facility] to leave a detailed message?: Yes         Call received on 21 at 10 am by Melba Rivas CMA, ELISA

## 2021-06-17 NOTE — PROGRESS NOTES
Pablo Russo is a 40 y.o. male who is being evaluated via a billable telephone visit .      What phone number would you like to be contacted at? [phone]   How would you like to obtain your AVS? AVS Preference: Mail a copy.  ____________________________    Virtual Visit - Telephone Encounter  [facility]  Family Medicine  Date of Service: 5/6/2021    Subjective:    Diabetes  Last A1c 9.1%    Hospitalized at [facility] 4/1-4/2/21 with hyperglycemia, back pain, GERD  Out of diabetes pills  Doesn't know how to use insulin - it seems like the pen needle isn't working.    Back Pain  Wants refill of back pain medicine - has been using ibuprofen, has naproxen on his med list and meloxicam. Also has PRN acetaminophen.   Longstanding  Lumbar x-ray normal 4/1/21.    Epigastric pain  GI cocktail helped 4/1/21  Mild hyperbilirubinemia in the past  Recent diagnosis of hepatitis C, Immune to hep B  CT abd pelvis - 4/1/21 - slight enhancement of distal small bowel - possible mild-mod enteritis  No alcohol use    Runny nose  Needs allergy medicine    Social  High utilizer of ER - mostly for high blood sugar related to problems getting his medication. Uses multiple care systems - Rocio FERNANDEZ [facility]    : Nikolay    Objective:  There were no vitals taken for this visit.   Speech is normal  Patient is calm    4/1/21  A1c 9.1%  Bilirubin 2.2  Glucose 418  Hep C antibody positive - ,000  Hep B core antibody positive 2/22/20, surface antibody positive  Result Impression   4/1/21 - CT abd/pelvis IMPRESSION:     1.  Study is negative for acute appendicitis.  2.  Combination of slight enhancement of the distal small bowel wall as well as mottled gas within the small bowel contents suggests slight hypomotility potentially as a result of mild to moderate enteritis.    Records reviewed from Care Everywhere and Media tab    Assessment & Plan:  Type 2 diabetes, uncontrolled with  "hyperglycemia. No nephropathy, retinopathy. Medication use has been limited by problems with insurance coverage, getting medications from his pharmacy, and the insulin pen needles not fitting on his pens.    Refilled meds    Referral to pharmacist to assist with medication education    Referral to clinic care coordination to support patient with medication adherence    Back Pain    Start meloxicam 15 mg daily.     STOP naproxen and ibuprofen due to risk of further stomach problems.     Use acetaminophen PRN - limit to max of 3000 mg due to hep C    Epigastric Pain  History of transaminitis, high bilirubin, hep C, multiple NSAID use. Recent normal CT abd 4/1/21.     Refill famotidine.    Pain meds as above.    Check labs as below    Chronic hepatitis C  Hep B immune    Check Hep A immune status and HIV    Recheck LFTs    Recent normal ct abdomen    Social Garcia language -  used  Frequent utilizer of ER - mostly diabetes related    Referral made for clinic care coordination    Short term follow up with PCP      Order Summary                                                      1. Type 2 diabetes mellitus without complication, with long-term current use of insulin (H)  insulin glargine (LANTUS SOLOSTAR U-100 INSULIN) 100 unit/mL (3 mL) pen    pen needle, diabetic (BD ULTRA-FINE MAHSA PEN NEEDLE) 32 gauge x 5/32\" Ndle    atorvastatin (LIPITOR) 20 MG tablet    metFORMIN (GLUCOPHAGE) 1000 MG tablet    Glycosylated Hemoglobin A1c    Amb Referral to Medication Management    Ambulatory referral to Care Management (Primary Care)   2. Chronic hepatitis C  (H)  HIV Antigen/Antibody Screening Dawson    Hepatitis A Antibody, IgG (Anti-HAV, IgG)    Hepatic Profile   3. Chronic low back pain, unspecified back pain laterality, unspecified whether sciatica present  meloxicam (MOBIC) 15 MG tablet    acetaminophen (TYLENOL) 325 MG tablet   4. Gastroesophageal reflux disease without esophagitis  famotidine (PEPCID) 20 MG " tablet   5. Allergic conjunctivitis, bilateral  olopatadine (PATANOL) 0.1 % ophthalmic solution   6. Primary insomnia  QUEtiapine (SEROQUEL) 50 MG tablet   7. Depressive disorder due to another medical condition with depressive features  sertraline (ZOLOFT) 50 MG tablet   8. Seasonal allergic rhinitis due to pollen  fluticasone propionate (FLONASE) 50 mcg/actuation nasal spray   9. Hyperlipidemia, unspecified hyperlipidemia type  LDL Cholesterol, Direct   10. Migraine without status migrainosus, not intractable, unspecified migraine type     11. Noncompliance with medication regimen        No future appointments.    Completed by: Chelly Rosenberg M.D., [facility]. 5/6/2021 4:21 PM.  This transcription uses voice recognition software, which may contain typographical errors.  ____________________________  Start call: 4:21 PM   End call: 4:49 PM  Phone call duration: 28 minutes

## 2021-06-17 NOTE — TELEPHONE ENCOUNTER
TC called [place] [employer]ricted [facility] [phone] or [phone] to request form to change patient's PCP to Dr. Ortiz.  TC had to leave a message.  says they will return call in 2 business days.

## 2021-06-17 NOTE — TELEPHONE ENCOUNTER
The problem is pt PCP is Dr. Ortiz, however insurance put Dr. Henriquez is PCP and restricted due to 7 ED visits since last September.   Pt has only see Dr. Henriquez twice - which was last September.   [facility] attempted to call [facility] program last week but got nowhere.      CMT called [facility] where pt picked up Meloxicam and pen needles?  The other problem is pt has TWO pharmacy's listed - [facility] and [facility]. Most of pt meds go to [facility], but his DM supplies go to [facility].   [facility] suggested to pharmacist at [facility] to call [facility] and switch what they have to them as they have a Radha  who can help the pt.      Not sure what to do or how to proceed - most of the meds ordered from last Thursday were from Dr. Rosenberg at [facility].  Dr. Henriquez ordered the meloxicam. And PCP ordered some.      This pt needs some community assistance.  So who does pt need to see - PCP or Dr. Henriquez??  Thanks

## 2021-06-17 NOTE — TELEPHONE ENCOUNTER
See PCP message below-sent by Dr. Ortiz today 05/07/2021 (patient scheduled with Dr. Ortiz 05/20/2021).    Patient is going to different providers and telling confusing stories about his situation. He was last seen in clinic by me on 3/18 and was mentally unstable, not sleeping for several days, appearing disheveled with blood shot eyes and poor hygiene which is not his baseline, and saying he needs his citizenship approved which is why he has not slept in 3-4 days. See previous notes for details. He did not come in for his scheduled follow up less than 2 weeks later, an appointment scheduled with the patient before he left that day. He went to the ER instead.     He told the ER yesterday that he could not get in to see PCP at [facility] until June, which is a lie. He is going to multiple ERs in both [facility] and [place] asking for his medications and saying he is restricted to Dr. Henriquez, who refilled requested medications yesterday and then made an appointment for Dr. Rosenberg to fill them as well.         Patient is unstable and has questionable mental health status. He needs to be seen in clinic, we do have openings before June.      Will forward to support staff to help patient schedule.      Peggy Ortiz MD

## 2021-06-17 NOTE — TELEPHONE ENCOUNTER
Patient is going to different providers and telling confusing stories about his situation. He was last seen in clinic by me on 3/18 and was mentally unstable, not sleeping for several days, appearing disheveled with blood shot eyes and poor hygiene which is not his baseline, and saying he needs his citizenship approved which is why he has not slept in 3-4 days. See previous notes for details. He did not come in for his scheduled follow up less than 2 weeks later, an appointment scheduled with the patient before he left that day. He went to the ER instead.    He told the ER yesterday that he could not get in to see PCP at [facility] until June, which is a lie. He is going to multiple ERs in both [facility] and [place] asking for his medications and saying he is restricted to Dr. Henriquez, who refilled requested medications yesterday and then made an appointment for Dr. Rosenberg to fill them as well.       Patient is unstable and has questionable mental health status. He needs to be seen in clinic, we do have openings before June.     Will forward to support staff to help patient schedule.     Peggy Ortiz MD

## 2021-06-17 NOTE — TELEPHONE ENCOUNTER
Received MTM referral from provider     Patient was not reachable after several attempts, will route to MTM Pharmacist/Provider as an FYI. Left MTM scheduling information on patients voicemail.    Thank you for the referral,    Giselle Huerta, MTM Coordinator

## 2021-06-17 NOTE — TELEPHONE ENCOUNTER
"Kayla from [facility] called and said that pt needs refills for diabetes meds & strips, pain meds and \"heart\" meds (Kayla thinks he means antidepressants, as he is talking about sadness) ASAP.    "

## 2021-06-20 NOTE — LETTER
Letter by Priscilla Lou BSW at      Author: Priscilla Lou BSW Service: -- Author Type: --    Filed:  Encounter Date: 6/19/2020 Status: (Other)       CARE COORDINATION  [address], [address]  [place] [address]    June 19, 2020    \[facility]\"" ARLEY Russo  [address]  [place] [address]      Dear \[facility]\"",    I am a clinic care coordinator  who works with Peggy Ortiz MD at the [facility]. I wanted to thank you for spending the time to talk with me.  Below is a description of clinic care coordination and how I can further assist you.      The clinic care coordination team is made up of a registered nurse,  and community health worker who understand the health care system. The goal of clinic care coordination is to help you manage your health and improve access to the health care system in the most efficient manner. The team can assist you in meeting your health care goals by providing education, coordinating services, strengthening the communication among your providers and supporting you with any resource needs.    Please feel free to contact Dilan the Community Health Worker at [phone] with any questions or concerns. We are focused on providing you with the highest-quality healthcare experience possible and that all starts with you.     Sincerely,     ARUN Katz, LSW    Enclosed: I have enclosed a copy of the Care Plan. This has helpful information and goals that we have talked about. Please keep this in an easy to access place to use as needed.

## 2021-06-20 NOTE — LETTER
Letter by Priscilla Lou BSW at      Author: Priscilla Lou BSW Service: -- Author Type: --    Filed:  Encounter Date: 6/19/2020 Status: (Other)       Care Plan  About Me:    Patient Name:  Pablo Russo    YOB: 1980  Age:         39 y.o.   [facility] MRN:    [id number] Telephone Information:  Home Phone [phone]   Mobile Not on file.       Address:  [address] [place] [address] Email address:  No e-mail address on record      Emergency Contact(s)  Extended Emergency Contact Information      Name: JONES GONZALEZ    Home Phone Number: [phone]  Relation: Cousin      Name: LINWOOD, HSA BRIDGET    Home Phone Number: [phone]  Relation: Other          Primary language:  Radha     needed? Yes   [employer]:  [phone] op. 1  Other communication barriers: Language barrier, Lack of coping  Preferred Method of Communication:     Current living arrangement: I live in a private home with family  Mobility Status/ Medical Equipment: Independent    Health Maintenance  Health Maintenance Reviewed: Not assessed    My Access Plan  Medical Emergency 911   Primary Clinic Line Peggy Ortiz MD - [phone]   24 Hour Appointment Line [phone] or  [phone] ([phone]) (toll-free)   24 Hour Nurse Line [phone] (toll-free)   Preferred Urgent Care [facility], [phone]   [facility]  [phone]   Preferred [facility]acy [id number] - [place], [place] - [address]     Behavioral Health Crisis Line The National Suicide Prevention Lifeline at 1-849.880.9860 or 911             My Care Team Members  Patient Care Team       Relationship Specialty Notifications Start End    Peggy Ortiz MD PCP - General Family Medicine Abnormal results only, Admissions 6/15/20     Phone: [phone] Fax: [phone]         [facility] [address] [address]     Priscilla Lou BSW Lead Care Coordinator Primary Care -  Admissions 6/19/20     Fax: [phone]         Glenys Lantigua, RN Clinic Care Coordinator Primary Care -  Admissions 6/19/20     Fax: [phone]                 My Care Plans  Self Management and Treatment Plan  Goals and (Comments)  Goals        General    Medication 1 (pt-stated)     Notes - Note created  6/19/2020  2:38 PM by Priscilla Lou BSW    Goal statement: I would like to meet with [facility] RN for medication education/teaching within the next 60 days.    Date goal set: 6/19/20  Barriers: Language barrier, poor historian, memory issues/concerns, limited insight into disease process and medications  Strengths: Appears willing to accept support  Date to achieve by: 8/19/20  Patient expressed understanding of goal: Yes    Action steps to achieve this goal:  1.  I will answer the phone when CHW contacts me to schedule an appointment with CCC RN.  2.  I will answer the phone when [facility] RN contacts me on the date of my appointment.      Mental Health Management 1 (pt-stated)     Notes - Note created  6/19/2020  2:36 PM by Priscilla Lou BSW    Goal statement: I would like to be referred for [facility] and in-home therapy within the next 60 days.    Date goal set: 6/19/20  Barriers: Language barrier, poor historian, memory issues/concerns, limited insight into disease process and medications  Strengths: Appears willing to accept support  Date to achieve by: 8/19/20  Patient expressed understanding of goal: Yes    Action steps to achieve this goal:  1.  [facility] SW submitted [facility] and in-home therapy referral to [facility] via email on 6/19/20.  2.  I will answer the phone when [facility] contacts me to set up initial diagnostic assessment.  3.  I will continue to take my mental health medications as prescribed by PCP.   4.  I will learn the name and phone number of my ARM[facility] worker once assigned and provide this information to CHW at  outreach.      Mental Health Management 2 (pt-stated)     Notes - Note created  6/19/2020  2:37 PM by Priscilla Lou BSW    Goal statement: I would like to schedule an N-648 waiver evaluation and neuropsychological testing through [facility] within the next 90 days.    Date goal set: 6/19/20  Barriers: Language barrier, poor historian, memory issues/concerns, limited insight into disease process and medications  Strengths: Appears willing to accept support  Date to achieve by: 9/19/20  Patient expressed understanding of goal: Yes    Action steps to achieve this goal:  1.  I will answer the phone when [facility] contacts me to schedule an appointment.  2.  I will attend my appointment with [facility] as scheduled.  3.  I will communicate with CHW at [facility].    NOTE: MICHELLE sent telephone encounter to PCP requesting order on 6/19/20.               Advance Care Plans/Directives Type:        My Medical and Care Information  Problem List   Patient Active Problem List   Diagnosis   ? Adjustment disorder with depressed mood   ? 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 Diabetes mellitus, type II (H)   ? Immune to hepatitis B   ? Insomnia   ? Lump of skin   ? Severe major depression without psychotic features (H)      Current Medications and Allergies:  See printed Medication Report.    Care Coordination Start Date: 6/19/2020   Frequency of Care Coordination:     Form Last Updated: 06/19/2020

## 2021-06-21 NOTE — LETTER
Letter by Chelly Rosenberg MD at      Author: Chelly Rosenberg MD Service: -- Author Type: --    Filed:  Encounter Date: 5/6/2021 Status: (Other)         May 6, 2021     Patient: Pablo Russo   YOB: 1980   Date of Visit: 5/6/2021       To Whom It May Concern:    Pablo Russo was seen for a visit today, please excuse him from work (5/6/2021).    If you have any questions or concerns, please don't hesitate to call.    Sincerely,        Electronically signed by Chelly Rosenberg MD

## 2021-06-21 NOTE — LETTER
Letter by Peggy Ortiz MD at      Author: Peggy Ortiz MD Service: -- Author Type: --    Filed:  Encounter Date: 11/17/2020 Status: (Other)         Pablo Russo  [address] [place] [address]                     November 17, 2020    Dear Hsa:    We have been trying to reach you to schedule a Medication check appointment. Please call the clinic to schedule at [phone].      You will need to bring all  medications, including insulin and meter.            Sincerely,        Electronically signed by Peggy Ortiz MD

## 2021-06-21 NOTE — LETTER
Letter by Chelly Rosenberg MD at      Author: Chelly Rosenberg MD Service: -- Author Type: --    Filed:  Encounter Date: 5/6/2021 Status: (Other)                    My Depression Action Plan  Name: Pablo Russo   Date of Birth 1980  Date: 5/6/2021    My Doctor: Peggy Ortiz MD   My Clinic: [address] [address]  [phone]          GREEN    ZONE   Good Control    What it looks like:     Things are going generally well. You have normal ups and downs. You may even feel depressed from time to time, but bad moods usually last less than a day.   What you need to do:  1. Continue to care for yourself (see self care plan)  2. Check your depression survival kit and update it as needed  3. Follow your physicians recommendations including any medication.  4. Do not stop taking medication unless you consult with your physician first.           YELLOW         ZONE Getting Worse    What it looks like:     Depression is starting to interfere with your life.     It may be hard to get out of bed; you may be starting to isolate yourself from others.    Symptoms of depression are starting to last most all day and this has happened for several days.     You may have suicidal thoughts but they are not constant.   What you need to do:     1. Call your care team. Your response to treatment will improve if you keep your care team informed of your progress. Yellow periods are signs an adjustment may need to be made.     2. Continue your self-care.  Just get dressed and ready for the day.  Don't give yourself time to talk yourself out of it.    3. Talk to someone in your support network.    4. Open up your depression Depression Self-Care Plan / Wellness kit.           RED    ZONE Medical Alert - Get Help    What it looks like:     Depression is seriously interfering with your life.     You may experience these or other symptoms: You cant get out of bed most days, cant work or engage  in other necessary activities, you have trouble taking care of basic hygiene, or basic responsibilities, thoughts of suicide or death that will not go away, self-injurious behavior.     What you need to do:  1. Call your care team and request a same-day appointment. If they are not available (weekends or after hours) call your local crisis line, emergency room or 911.          Depression Self-Care Plan / Wellness Kit    Many people find that medication and therapy are helpful treatments for managing depression. In addition, making small changes to your everyday life can help to boost your mood and improve your wellbeing. Below are some tips for you to consider. Be sure to talk with your medical provider and/or behavioral health consultant if your symptoms are worsening or not improving.     Sleep  Sleep hygiene means all of the habits that support good, restful sleep. It includes maintaining a consistent bedtime and wake time, using your bedroom only for sleeping or sex, and keeping the bedroom dark and free of distractions like a computer, smartphone, or television.     Develop a Healthy Routine  Maintain good hygiene. Get out of bed in the morning, make your bed, brush your teeth, take a shower, and get dressed. Dont spend too much time viewing media that makes you feel stressed. Find time to relax each day.    Exercise  Get some form of exercise every day. This will help reduce pain and release endorphins, the feel good chemicals in your brain. It can be as simple as just going for a walk or doing some gardening, anything that will get you moving.      Diet  Strive to eat healthy foods, including fruits and vegetables. Drink plenty of water. Avoid excessive sugar, caffeine, alcohol, and other mood-altering substances.     Stay Connected with Others  Stay in touch with friends and family members.    Manage Your Mood  Try deep breathing, massage therapy, biofeedback, or meditation. Take part in fun activities when  you can. Try to find something to smile about each day.     Psychotherapy  Be open to working with a therapist if your provider recommends it.     Medication  Be sure to take your medication as prescribed. Most anti-depressants need to be taken every day. It usually takes several weeks for medications to work. Not all medicines work for all people. It is important to follow-up with your provider to make sure you have a treatment plan that is working for you. Do not stop your medication abruptly without first discussing it with your provider.    Crisis Resources   These hotlines are for both adults and children. They and are open 24 hours a day, 7 days a week unless noted otherwise.      National Suicide Prevention Lifeline   1-795-895-TALK (3616)      Crisis Text Line    www.crisistextline.org  Text HOME to 050373 from anywhere in the [place], anytime, about any type of crisis. A live, trained crisis counselor will receive the text and respond quickly.      [facility] Lifeline for LGBTQ Youth  A national crisis intervention and suicide lifeline for LGBTQ youth under 25. Provides a safe place to talk without judgement. Call 1-260.817.1209; text START to 368314 or visit www.theBrainientvorproject.org to talk to a trained counselor.      For Pending sale to [facility] crisis numbers, visit the [facility] website at:  https://[place].gov/dhs/people-we-serve/adults/health-care/mental-health/resources/crisis-contacts.jsp

## 2021-06-25 NOTE — TELEPHONE ENCOUNTER
Hi Dr Henriquez,     I spoke with patient today and he only has Lantus, Metformin 500mg tab ( wrong dose) and Sertraline in home.     Patient is restricted only to Dr Henriquez the next 90 days and patient needs his meds refill as soon as possible.     CMT - please queue all prescriptions for Dr Henriquez so he could sign it if he agrees.     Dr Ortiz will be his PCP after 90 days per patient. Patient has been seeing Dr Ortiz and established care with Dr Ortiz but Dr Henriquez was added as his PCP accidentally.     Thank you,  Glenys Lantigua, RNrln

## 2021-06-29 ENCOUNTER — COMMUNICATION - HEALTHEAST (OUTPATIENT)
Dept: NURSING | Facility: CLINIC | Age: 41
End: 2021-06-29
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
## 2021-07-03 NOTE — ADDENDUM NOTE
Addendum Note by Shellie Noble MD at 11/18/2020  9:21 AM     Author: Shellie Noble MD Service: -- Author Type: Physician    Filed: 11/18/2020  9:21 AM Encounter Date: 11/17/2020 Status: Signed    : Shellie Noble MD (Physician)    Addended by: SHELLIE NOBLE on: 11/18/2020 09:21 AM        Modules accepted: Orders

## 2021-07-03 NOTE — ADDENDUM NOTE
Addendum Note by Kayli Pereira MD at 6/19/2020  3:17 PM     Author: Kayli Pereira MD Service: -- Author Type: Physician    Filed: 6/19/2020  3:17 PM Encounter Date: 6/19/2020 Status: Signed    : Kayli Pereira MD (Physician)    Addended by: KAYLI PEREIRA on: 6/19/2020 03:17 PM        Modules accepted: Orders

## 2021-07-04 NOTE — LETTER
Letter by Glenys Lantigua RN at      Author: Glenys Lantigua RN Service: -- Author Type: --    Filed:  Encounter Date: 5/26/2021 Status: (Other)       Care Plan  About Me:    Patient Name:  Pablo Russo    YOB: 1980  Age:         40 y.o.   [facility] MRN:    [id number] Telephone Information:  Home Phone [phone]   Mobile [phone]       Address:  [address] [place] [address] Email address:  No e-mail address on record      Emergency Contact(s)  Extended Emergency Contact Information      Name: Mohinder Ho    Relation: Sibling          Primary language:  [place]     needed? Yes    [facility]:  [phone] op. 1  Other communication barriers: Language barrier, Lack of coping  Preferred Method of Communication:     Current living arrangement: I live in a private home with family  Mobility Status/ Medical Equipment: Independent    Health Maintenance  Health Maintenance Reviewed:      My Access Plan  Medical Emergency 911   Primary Clinic Line Cade Henriquez MD - [phone]   24 Hour Appointment Line [phone] or  [phone] ([phone]) (toll-free)   24 Hour Nurse Line [phone] (toll-free)   Preferred Urgent Care [facility], [phone]   [facility]  [phone]   Preferred Pharmacy [id number] - [place], [place] - [address]     Behavioral Health Crisis Line The National Suicide Prevention Lifeline at 1-769.572.3057 or 911             My Care Team Members  Patient Care Team       Relationship Specialty Notifications Start End    Cade Henriquez MD PCP - General Family Medicine  5/25/21     restricted to Dr. Henriquez    Phone: [phone] Fax: [phone]         [address] [place] [address]    Peggy Ortiz MD Assigned PCP   6/20/20     Phone: [phone] Fax: [phone]         [address] [address]    Kayla Lantigua   Behavioral Health  7/28/20     In-home therapist through [facility]    Phone: [phone]         Chelly Rosenberg MD Physician Family Medicine  5/10/21     Phone: [phone] Fax: [phone]         [address] [place] [id number]    Charlene Mcneal, PharmD Pharmacist Pharmacist  5/20/21     Phone: [phone] Fax: [phone]         [address] [facility] [address]    Atul CHARLOTTEBETH Worker    5/24/21     [facility]    Phone: [phone]         Glenys Lantigua, RN Lead Care Coordinator Primary Care - [facility] Admissions 5/26/21     Fax: [phone]         Dilan Clark CHW Community Health Worker Primary Care - [facility] Admissions 5/26/21     Phone: [phone] Fax: [phone]                My Care Plans  Self Management and Treatment Plan  Goals and (Comments)  Goals        General    Medical (pt-stated)     Notes - Note created  6/9/2021  5:55 AM by Glenys Lantigua, RN    Goal Statement: I would like [facility] RN to teach me how to take my meds, insulin and glucometer teaching correctly the next 90 days.     Date Goal set: 5/26/2201  Barriers: language barrier, lack of knowledge, non-compliance  Strengths: agrees to work on goal  Date to Achieve By: 8/26/2021  Patient expressed understanding of goal: Yes    Action steps to achieve this goal:  1. I will attend my appt with [facility] RN on 6/11/2021 at 10:00am.   2. I will take my meds and insulin as prescribed   3. I will call [facility] RN with concerns or questions.                Advance Care Plans/Directives Type:        My Medical and Care Information  Problem List   Patient Active Problem List   Diagnosis   ? Diabetes mellitus, type II (H)   ? Insomnia   ? Lump of skin   ? 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 Chronic hepatitis C  (H)   ? Migraine   ? Noncompliance with medication regimen   ? Meibomian gland dysfunction      Current Medications and Allergies:  See printed Medication  Report.    Care Coordination Start Date: 5/26/2021   Frequency of Care Coordination: 6 weeks   Form Last Updated: 06/09/2021

## 2021-07-04 NOTE — PROGRESS NOTES
Progress Notes by Glenys Lantigua RN at 5/26/2021  9:00 AM     Author: Glenys Lantigua RN Service: -- Author Type: Registered Nurse    Filed: 6/9/2021  5:57 AM Encounter Date: 5/26/2021 Status: Signed    : Glenys Lantigua RN (Registered Nurse)       Clinic Care Coordination Contact    Clinic Care Coordination Contact  OUTREACH    Referral Information:  Referral Source: PCP    Primary Diagnosis: Diabetes    Chief Complaint   Patient presents with   ? Clinic Care Coordination - Initial       Clinic Utilization  Difficulty keeping appointments:: No  Compliance Concerns: Yes  No-Show Concerns: Yes  No PCP office visit in Past Year: No  Utilization    Last refreshed: 5/26/2021 10:27 AM: Hospital Admissions 0           Last refreshed: 5/26/2021 10:27 AM: ED Visits 1           Last refreshed: 5/26/2021 10:27 AM: No Show Count (past year) 5              Current as of: 5/26/2021 10:27 AM              Clinical Concerns:  - CCC RN assessment completed today with patient via phone. Took 3 attempts to be able to reach patient.     - works night shift. Got to work at 8pm and come home at 6am. States he works at [employer]?  - lives in single family home with his brother and his brother family total of 7 people.   - Never attended school.     - Patient was referral to [facility] by Dr Rosenberg - [email or web address] clinic provider.     - Patient went to ED 8 times the past 12 months due to hyperglycemia.   - Patient states he wasn't able to  his medication - not knowing he's restricted to one provider so he wasn't able to  his meds.   - patient works night shift and sleep during the day.   - Patient states due to work schedule and it's hard for him to follow up on his doctor appts and not sure who to turn to with questions re: his meds.   - Patient was referral to home care but he declined due to work schedule.   - [facility] RN will look into bubble pack if he agrees.       1) Restricted to Dr Henriquez   - restricted to PCP the next 90 days.     Specialty  Scheduling 05/26/2021  9:52 AM Aly Romano - -   Note    Received MRRP approval from [facility] dated May 19th, 2021. (Also see scan documents.)     Reference number: [id number]  Dates of Service: 05/14/2021-05/14/2022  Servicing Clinic: [facility]  Servicing provider: Peggy Ortiz  Requesting Provider: [facility]  Service requested: [place] Referral with prescribing rights  Number of unit/visits: 9999     TC also spoke to Lexis Leal, Admin referral Coordinator. She states patient can see Dr. Ortiz or any providers within [facility] - [facility] will cover provider services at [place]. All referrals will need to come from Dr. Henriquez until pcp selection change to Dr. Ortiz after 90 days.                2) DM II mgmt/teaching  - Patient states he hasn't been checking his BG since he got his glucometer.   - Patient states he doesn't know how to use his glucometer when asked to check his BG while CCC RN on the phone which contradict with MTM visit dated on 5/20/201.   - Patient was out of several meds and he's not sure how to take his meds because it's too many.   - Patient states he went to ED multiple times because he doesn't know how to manage his diabetes.   - Patient declined home care stating that he works night shift and sleeps during the day but agrees to consider for additional support if his A1c doesn't improve with next A1c lab draw.   - A1c 8.1 on 5/6/2021 worsening from 6.4 on 10/15/2021.   - Patient agrees to come see CCC RN at clinic on 6/11/2021 at 10am for med teaching and teach how to use glucometer correctly.       Pain  Pain (GOAL):: No  Health Maintenance Reviewed:    Clinical Pathway: Clinic Care Coordination Diabetes Assessment    Diabetes type: Type 2  What is the duration of your diabetes?   What is the state of your diabetes? Worsening  Have you received Diabetes education in the past 24 months? No    Symptoms:  Do you have:  Symptoms  Blurred vision: Yes  Fatigue:  Yes  Neuropathy: No  Foot ulcerations: No  Polyphagia: No  Polyuria: Yes  Visual change: Yes  Weakness: No  Weight loss: No  Slow healing wounds: No  Recent Infection(s): No  Symptom course: Worsening  Weight trend: Stable    Hypoglycemia Symptoms:  Do you have:  Hypoglycemia symptoms  Confusion: No  Dizziness or Light-Headedness: No  Headaches: No  Hunger: No  Mood changes: No  Nervousness/Anxiety: No  Sleepiness: No  Speech difficulty: No  Sweats: No  Tremors: No    Do you carry a carbohydrate source? No    Hypoglycemia Complications:  Do you experience:  Hypoglycemia Complications  Blackouts: No  Hospitalization: Yes  Nocturnal hypoglycemia: No  Required glucagon injection: No  Seizures: No    Diabetic Complications:   Diabetic Complications  Autonomic neuropathy: No  CVA: No  Heart disease: No  Nephropathy: No  Peripheral neuropathy: No  Peripheral Vascular Disease: No  Retinopathy: No  Sexual dysfunction: Yes    Diabetes Risks: Hypertriglyceridemia, Ethnicity, Sedentary Lifestyle    Current Treatments: Diet, Insulin Injections, Oral Agent (monotherapy)    Blood Glucose Meter: Accu-check    Insulin Regimen:   Dose schedule: pre-breakfast  Given by: Patient  Injection/Infusion sites: Abdomen    Monitoring Regimen:   Home Glucose (Sugar) Monitorin-2 times per day  Monitoring compliance: Poor    How is your weight trend? Stable  How often do you meal plan?    Do you visit a dietician?    Patient on a regular basis: Eats 3 meals a day  Meals include: Breakfast, Lunch, Dinner  Beverages: Water         Medication Management:  - Reviewed meds with patient today. States he wasn't able to  the rest. He was told that he PCP needs to send scripts.   - States he only has 3 meds in home - Metformin 500mg ( not 1000mg), Lantus and Sertraline.   - Dr Henriquez was notified to send in new scripts for all meds.   - Need additional teaching needed.     1) Atorvastatin 20mg (1) tab daily - HS    2) Famotidine 20mg (1) tab  two times a day - states nothing wrong with his stomach or gastroesophageal reflux disease so he doesn't have to take it. States he's not able to pick it up either.     3) Fluticasone - not able to pick it up    4) Lantus 10 units at AM - not 20 units - Per MTM notes dated on 5/20/2021 - to use 10 units daily    5) Meloxicam 15mg - not able to pick it up    6) Metformin 1000mg (1) two times a day with meals - reports he has 500mg (1) tab -     7) Olopatadine 0.1% eye drop - would like refill    8) Quetiapine 50mg (1) tab daily at HS - not in home     9) Sertraline 50mg (1) tab daily - reports taking it daily    10) Sildenafil 20mg (1) tab daily - not taking     Functional Status:  Dependent ADLs:: Independent  Dependent IADLs:: Independent  Bed or wheelchair confined:: No  Mobility Status: Independent  Fallen 2 or more times in the past year?: No  Any fall with injury in the past year?: No    Living Situation:  Current living arrangement:: I live in a private home with family  Type of residence:: Private home - stairs    Lifestyle & Psychosocial Needs:  Lifestyle   ? Physical activity     Days per week: 5 days     Minutes per session: 30 min   ? Stress: To some extent     Social Needs   ? Financial resource strain: Not very hard   ? Food insecurity     Worry: Never true     Inability: Never true   ? Transportation needs     Medical: No     Non-medical: No     Diet:: Diabetic diet  Inadequate nutrition (GOAL):: No  Tube Feeding: No  Inadequate activity/exercise (GOAL):: No  Significant changes in sleep pattern (GOAL): No  Transportation means:: Medical transport     Holiness or spiritual beliefs that impact treatment:: No  Mental health DX:: Yes  Mental health DX how managed:: Medication  Mental health management concern (GOAL):: No  Chemical Dependency Status: Not Applicable  Informal Support system:: [place] based, Family, Friends   Socioeconomic History   ? Marital status: Single     Spouse name: Not on file   ?  Number of children: 0   ? Years of education: Not on file   ? Highest education level: Not on file   Relationships   ? Social connections     Talks on phone: More than three times a week     Gets together: More than three times a week     Attends [employer] service: 1 to 4 times per year     Active member of club or organization: No     Attends meetings of clubs or organizations: Never     Relationship status: Never    ? Intimate partner violence     Fear of current or ex partner: No     Emotionally abused: No     Physically abused: No     Forced sexual activity: No     Tobacco Use   ? Smoking status: Former Smoker   ? Smokeless tobacco: Never Used   Substance and Sexual Activity   ? Alcohol use: Yes     Frequency: 2-4 times a month     Drinks per session: 1 or 2     Binge frequency: Never   ? Drug use: Not Currently   ? Sexual activity: Not Currently     Partners: Female       Resources and Interventions:  Current Resources:      Community Resources: None  Supplies Currently Used at Home: Diabetic Supplies  Equipment Currently Used at Home: none  Type of Employment: Full-time            Referrals Placed: None     Goals:      Goals        Patient Stated    ? Medical (pt-stated)      Goal Statement: I would like CCC RN to teach me how to take my meds, insulin and glucometer teaching correctly the next 90 days.     Date Goal set: 5/26/2201  Barriers: language barrier, lack of knowledge, non-compliance  Strengths: agrees to work on goal  Date to Achieve By: 8/26/2021  Patient expressed understanding of goal: Yes    Action steps to achieve this goal:  1. I will attend my appt with CCC RN on 6/11/2021 at 10:00am.   2. I will take my meds and insulin as prescribed   3. I will call CCC RN with concerns or questions.               Outreach Frequency: 6 weeks  Future Appointments              In 2 weeks HEATHER CCC RN [facility]    In 2 weeks Peggy Ortiz MD [facility]  [facility]          Plan:   1) Patient will attend his appt with [facility] RN in person on 6/11/2021.

## 2021-07-04 NOTE — ADDENDUM NOTE
Addendum Note by Reyes Dillard CMA at 5/26/2021 10:58 AM     Author: Reyes Dillard CMA Service: -- Author Type: Certified Medical Assistant    Filed: 5/26/2021 10:58 AM Encounter Date: 5/26/2021 Status: Signed    : Reyes Dillard CMA (Certified Medical Assistant)    Addended by: REYES DILLARD on: 5/26/2021 10:58 AM        Modules accepted: Orders

## 2021-07-04 NOTE — ADDENDUM NOTE
Addendum Note by Donta Henriquez MD at 5/26/2021  4:05 PM     Author: Donta Henriquez MD Service: -- Author Type: Physician    Filed: 5/26/2021  4:05 PM Encounter Date: 5/26/2021 Status: Signed    : Donta Henriquez MD (Physician)    Addended by: DONTA HENRIQUEZ on: 5/26/2021 04:05 PM        Modules accepted: Orders

## 2021-07-04 NOTE — LETTER
Letter by Glenys Lantigua RN at      Author: Dah, Glenys, RN Service: -- Author Type: --    Filed:  Encounter Date: 5/26/2021 Status: (Other)       [facility]  [address], [address]  [place] [address]      May 26, 2021    \[facility]\"" ARLEY Russo  [address]  [place] [address]      Dear \[facility]\"",    I am a clinic care coordinator who works with Cade Henriquez MD at [address] [facility] [address]  [place] [address]    . I wanted to thank you for spending the time to talk with me.  Below is a description of clinic care coordination and how I can further assist you.      The clinic care coordination team is made up of a registered nurse,  and community health worker who understand the health care system. The goal of clinic care coordination is to help you manage your health and improve access to the health care system in the most efficient manner. The team can assist you in meeting your health care goals by providing education, coordinating services, strengthening the communication among your providers and supporting you with any resource needs.    Please feel free to contact me and the Community Health Worker at [phone] or [phone] or [phone] with any questions or concerns. We are focused on providing you with the highest-quality healthcare experience possible and that all starts with you.     Sincerely,     Glenys Lantigua RN    Enclosed: I have enclosed a copy of the Care Plan. This has helpful information and goals that we have talked about. Please keep this in an easy to access place to use as needed.

## 2021-07-07 NOTE — PROGRESS NOTES
Clinic Care Coordination Contact  [facility]/Voicemail    Clinical Data: Care Coordinator Outreach  Outreach attempted x 1. CHW called and left message on patient's voicemail with call back information and requested return call.    Plan: Care Coordinator will try to reach patient again in two weeks.

## 2021-07-10 ENCOUNTER — HOSPITAL ENCOUNTER (EMERGENCY)
Dept: EMERGENCY MEDICINE | Facility: HOSPITAL | Age: 41
Discharge: HOME | End: 2021-07-10
Attending: EMERGENCY MEDICINE
Payer: COMMERCIAL

## 2021-07-10 DIAGNOSIS — E11.9 TYPE 2 DIABETES MELLITUS WITHOUT COMPLICATION, WITH LONG-TERM CURRENT USE OF INSULIN (H): ICD-10-CM

## 2021-07-10 DIAGNOSIS — R11.0 NAUSEA WITHOUT VOMITING: ICD-10-CM

## 2021-07-10 DIAGNOSIS — Z79.4 TYPE 2 DIABETES MELLITUS WITHOUT COMPLICATION, WITH LONG-TERM CURRENT USE OF INSULIN (H): ICD-10-CM

## 2021-07-10 DIAGNOSIS — R19.7 DIARRHEA, UNSPECIFIED TYPE: ICD-10-CM

## 2021-07-10 LAB
ALBUMIN SERPL-MCNC: 4.1 G/DL (ref 3.5–5)
ALP SERPL-CCNC: 55 U/L (ref 45–120)
ALT SERPL W P-5'-P-CCNC: 71 U/L (ref 0–45)
ANION GAP SERPL CALCULATED.3IONS-SCNC: 11 MMOL/L (ref 5–18)
AST SERPL W P-5'-P-CCNC: 36 U/L (ref 0–40)
BILIRUB DIRECT SERPL-MCNC: 0.3 MG/DL
BILIRUB SERPL-MCNC: 1.1 MG/DL (ref 0–1)
BUN SERPL-MCNC: 12 MG/DL (ref 8–22)
CALCIUM SERPL-MCNC: 9.4 MG/DL (ref 8.5–10.5)
CHLORIDE BLD-SCNC: 102 MMOL/L (ref 98–107)
CO2 SERPL-SCNC: 27 MMOL/L (ref 22–31)
CREAT SERPL-MCNC: 1 MG/DL (ref 0.7–1.3)
ERYTHROCYTE [DISTWIDTH] IN BLOOD BY AUTOMATED COUNT: 11.6 % (ref 11–14.5)
GFR SERPL CREATININE-BSD FRML MDRD: >60 ML/MIN/1.73M2
GLUCOSE BLD-MCNC: 253 MG/DL (ref 70–125)
GLUCOSE BLDC GLUCOMTR-MCNC: 226 MG/DL (ref 70–139)
HCT VFR BLD AUTO: 45.6 % (ref 40–54)
HGB BLD-MCNC: 15.5 G/DL (ref 14–18)
LIPASE SERPL-CCNC: 33 U/L (ref 0–52)
MCH RBC QN AUTO: 31.4 PG (ref 27–34)
MCHC RBC AUTO-ENTMCNC: 34 G/DL (ref 32–36)
MCV RBC AUTO: 92 FL (ref 80–100)
PLATELET # BLD AUTO: 233 THOU/UL (ref 140–440)
PMV BLD AUTO: 9.7 FL (ref 8.5–12.5)
POTASSIUM BLD-SCNC: 4.3 MMOL/L (ref 3.5–5)
PROT SERPL-MCNC: 7.5 G/DL (ref 6–8)
RBC # BLD AUTO: 4.94 MILL/UL (ref 4.4–6.2)
SODIUM SERPL-SCNC: 140 MMOL/L (ref 136–145)
WBC: 8.5 THOU/UL (ref 4–11)

## 2021-07-10 RX ORDER — ONDANSETRON 4 MG/1
4 TABLET, ORALLY DISINTEGRATING ORAL EVERY 8 HOURS PRN
Qty: 10 TABLET | Refills: 0 | Status: SHIPPED | OUTPATIENT
Start: 2021-07-10 | End: 2021-08-06

## 2021-07-10 RX ORDER — INSULIN GLARGINE 100 [IU]/ML
20 INJECTION, SOLUTION SUBCUTANEOUS AT BEDTIME
Qty: 3 ML | Refills: 0 | Status: SHIPPED | OUTPATIENT
Start: 2021-07-10 | End: 2021-10-05

## 2021-07-10 NOTE — ED NOTES
ED Notes by Dana Richard RN at 7/10/2021  5:34 PM     Author: Dana Richard RN Service: -- Author Type: Registered Nurse    Filed: 7/10/2021  6:00 PM Date of Service: 7/10/2021  5:34 PM Status: Signed    : Dana Richard RN (Registered Nurse)       Tolerated swallow test well, patient able to swallow fluids without difficulty, and without complaints.

## 2021-07-10 NOTE — ED NOTES
ED Notes by Rashmi Forte RN at 7/10/2021  4:14 PM     Author: Rashmi Forte RN Service: -- Author Type: Registered Nurse    Filed: 7/10/2021  4:14 PM Date of Service: 7/10/2021  4:14 PM Status: Signed    : Rashmi Forte RN (Registered Nurse)       POCT glucose in triage 226.

## 2021-07-10 NOTE — ED TRIAGE NOTES
ED Triage Notes by Susan Varner RN at 7/10/2021  2:44 PM     Author: Susan Varner RN Service: -- Author Type: Registered Nurse    Filed: 7/10/2021  2:45 PM Date of Service: 7/10/2021  2:44 PM Status: Signed    : Susan Varner RN (Registered Nurse)       Patient presents with diarrhea for the past two days.

## 2021-07-11 NOTE — ED PROVIDER NOTES
ED Provider Notes by Yvette Baum MD at 7/10/2021  3:19 PM     Author: Yvette Baum MD Service: -- Author Type: Physician    Filed: 7/10/2021  8:16 PM Date of Service: 7/10/2021  3:19 PM Status: Signed    : Yvette Baum MD (Physician)       EMERGENCY DEPARTMENT ENCOUnter      NAME: Pablo Russo  AGE: 40 y.o. male  YOB: 1980  MRN: [id number]  EVALUATION DATE & TIME: 7/10/2021  4:50 PM    PCP: Cade Henriquez MD    ED PROVIDER: Yvette Baum M.D.      Chief Complaint   Patient presents with   ? Diarrhea         FINAL IMPRESSION:  1. Diarrhea, unspecified type    2. Nausea without vomiting    3. Type 2 diabetes mellitus without complication, with long-term current use of insulin (H)          ED COURSE & MEDICAL DECISION MAKING:    Pertinent Labs & Imaging studies reviewed. (See chart for details)    40 y.o. male, history of DM and chronic hepatitis C, who presents for evaluation of ~4-5 episodes of non-bloody diarrhea daily for the past 2-3 days associated with nausea without vomiting and subjective fever. Has not been able to take much po secondary to symptoms. He denies associated abdominal pain.    On exam, abdomen is soft and non-tender to palpation.  I do not suspect appendicitis, colitis, diverticulitis, bowel obstruction, intra-abdominal abscess, perforated viscus or other surgical emergency and risks of CT imaging felt to outweigh benefit.    IV access established, blood sent for labs and IVF initiated.    Labs remarkable for no leukocytosis, anemia, significant electrolyte derangements or renal impairment.  Glucose is elevated (253) without associated acidosis or elevated anion gap to suggest DKA; repeat glucose after .  Hepatic panel remarkable for mildly elevated ALT (71) with no laboratory evidence of biliary obstruction or pancreatitis.    Patient feeling better after IVF and IV Zofran and was able to tolerate po.    Patient discharged home with  follow-up PMD.  He was given a prescription for ODT Zofran as well as a prescription for insulin (x 15 day supple) at his request.  Return instructions provided.  Patient stable throughout ED course.        3:19 PM I met with patient for initial interview and encounter. PPE worn includes surgical mask and cap.  5:01 PM We discussed plans for discharge including supportive cares, symptomatic treatment, outpatient follow up, and reasons to return to the emergency department.      At the conclusion of the encounter I discussed the results of all of the tests and the disposition.  Questions were answered. The patient acknowledged understanding and was agreeable with the care plan.       MEDICATIONS GIVEN IN THE EMERGENCY:  Medications   sodium chloride 0.9% 500 mL (0 mL Intravenous Stopped 7/10/21 1655)   ondansetron injection 4 mg (ZOFRAN) (4 mg Intravenous Given 7/10/21 1714)       NEW PRESCRIPTIONS STARTED AT TODAY'S ER VISIT  Discharge Medication List as of 7/10/2021  5:54 PM      START taking these medications    Details   ondansetron (ZOFRAN ODT) 4 MG disintegrating tablet Take 1 tablet (4 mg total) by mouth every 8 (eight) hours as needed for nausea., Starting Sat 7/10/2021, Print         CONTINUE these medications which have NOT CHANGED    Details   !! ACCU-CHEK FASTCLIX LANCET DRUM USE 1 EACH TO TEST DAILY., Normal      ACCU-CHEK GUIDE TEST STRIPS strips Use 1 each As Directed daily. Dispense brand per patient's insurance at pharmacy discretion., Starting Wed 5/26/2021, Normal      atorvastatin (LIPITOR) 20 MG tablet Take 1 tablet (20 mg total) by mouth daily., Starting Wed 5/26/2021, Until Thu 5/26/2022, Normal      blood-glucose meter Misc Use 1 application As Directed daily for 1 day., Starting Wed 5/26/2021, Until Thu 5/27/2021, Normal      famotidine (PEPCID) 20 MG tablet Take 1 tablet (20 mg total) by mouth 2 (two) times a day., Starting Wed 5/26/2021, Normal      fluticasone propionate (FLONASE) 50  "mcg/actuation nasal spray 2 sprays into each nostril daily., Starting Wed 5/26/2021, Normal      !! generic lancets Use 1 each As Directed daily. Dispense microlet lancets to use with his new Contour Next meter please - recently received accu-chek lancets which do not work with new meter, Starting Wed 5/26/2021, Normal      meloxicam (MOBIC) 15 MG tablet Take 1 tablet (15 mg total) by mouth daily., Starting Wed 5/26/2021, Normal      metFORMIN (GLUCOPHAGE) 1000 MG tablet Take 1 tablet (1,000 mg total) by mouth 2 (two) times a day., Starting Wed 5/26/2021, Until Thu 5/26/2022, Normal      olopatadine (PATANOL) 0.1 % ophthalmic solution Administer 1 drop to both eyes 2 (two) times a day., Starting Wed 5/26/2021, Normal      pen needle, diabetic (BD ULTRA-FINE MAHSA PEN NEEDLE) 32 gauge x 5/32\" Ndle Inject 1 each under the skin daily., Starting Wed 5/26/2021, Normal      QUEtiapine (SEROQUEL) 50 MG tablet Take 1 tablet (50 mg total) by mouth at bedtime., Starting Wed 5/26/2021, Normal      sertraline (ZOLOFT) 50 MG tablet Take 1 tablet (50 mg total) by mouth daily., Starting Wed 5/26/2021, Until Thu 5/26/2022, Normal      sildenafil (REVATIO) 20 mg tablet Take 20 mg by mouth., Starting Fri 6/19/2020, Historical Med      insulin glargine (LANTUS SOLOSTAR U-100 INSULIN) 100 unit/mL (3 mL) pen Inject 20 Units under the skin at bedtime., Starting Wed 5/26/2021, Until Thu 5/26/2022, Normal       !! - Potential duplicate medications found. Please discuss with provider.             =================================================================    HPI    Patient information was obtained from: Patient     Use of Intrepreter: Yes (Phone) - Chloe Shah ARLEY Russo is a 40 y.o. male with a pertinent history of DM type II, chronic hepatitis C, who presents for evaluation of diarrhea. Patient reports onset of diarrhea with associated nausea 2-3 days ago. Has been having 4-5 episodes of diarrhea per day. No hematochezia. He states " "he has not been eating or drinking as regularly due to these symptoms. Denies associated abdominal pain. Also endorses a subjective fever. No recent travel or antibiotic use.     He has otherwise been in his usual state of health and denies chest pain, shortness of breath, cough or other concerns.    REVIEW OF SYSTEMS   All other systems reviewed and are negative.    PAST MEDICAL HISTORY:  Past Medical History:   Diagnosis Date   ? Immune to hepatitis B 2/12/2020 2/11/2020 - Immune from prior infection       PAST SURGICAL HISTORY:  History reviewed. No pertinent surgical history.    CURRENT MEDICATIONS:    No current facility-administered medications on file prior to encounter.      Current Outpatient Medications on File Prior to Encounter   Medication Sig   ? ACCU-CHEK FASTCLIX LANCET DRUM USE 1 EACH TO TEST DAILY.   ? ACCU-CHEK GUIDE TEST STRIPS strips Use 1 each As Directed daily. Dispense brand per patient's insurance at pharmacy discretion.   ? atorvastatin (LIPITOR) 20 MG tablet Take 1 tablet (20 mg total) by mouth daily.   ? blood-glucose meter Misc Use 1 application As Directed daily for 1 day.   ? famotidine (PEPCID) 20 MG tablet Take 1 tablet (20 mg total) by mouth 2 (two) times a day.   ? fluticasone propionate (FLONASE) 50 mcg/actuation nasal spray 2 sprays into each nostril daily.   ? generic lancets Use 1 each As Directed daily. Dispense microlet lancets to use with his new Contour Next meter please - recently received accu-chek lancets which do not work with new meter   ? meloxicam (MOBIC) 15 MG tablet Take 1 tablet (15 mg total) by mouth daily.   ? metFORMIN (GLUCOPHAGE) 1000 MG tablet Take 1 tablet (1,000 mg total) by mouth 2 (two) times a day.   ? olopatadine (PATANOL) 0.1 % ophthalmic solution Administer 1 drop to both eyes 2 (two) times a day.   ? pen needle, diabetic (BD ULTRA-FINE MAHSA PEN NEEDLE) 32 gauge x 5/32\" Ndle Inject 1 each under the skin daily.   ? QUEtiapine (SEROQUEL) 50 MG " tablet Take 1 tablet (50 mg total) by mouth at bedtime.   ? sertraline (ZOLOFT) 50 MG tablet Take 1 tablet (50 mg total) by mouth daily.   ? sildenafil (REVATIO) 20 mg tablet Take 20 mg by mouth.   ? [DISCONTINUED] insulin glargine (LANTUS SOLOSTAR U-100 INSULIN) 100 unit/mL (3 mL) pen Inject 20 Units under the skin at bedtime.       ALLERGIES:  No Known Allergies    FAMILY HISTORY:  History reviewed. No pertinent family history.    SOCIAL HISTORY:   Social History     Socioeconomic History   ? Marital status: Single     Spouse name: None   ? Number of children: 0   ? Years of education: None   ? Highest education level: None   Occupational History   ? None   Social Needs   ? Financial resource strain: Not very hard   ? Food insecurity     Worry: Never true     Inability: Never true   ? Transportation needs     Medical: No     Non-medical: No   Tobacco Use   ? Smoking status: Former Smoker   ? Smokeless tobacco: Never Used   Substance and Sexual Activity   ? Alcohol use: Yes     Frequency: 2-4 times a month     Drinks per session: 1 or 2     Binge frequency: Never   ? Drug use: Not Currently   ? Sexual activity: Not Currently     Partners: Female   Lifestyle   ? Physical activity     Days per week: 5 days     Minutes per session: 30 min   ? Stress: To some extent   Relationships   ? Social connections     Talks on phone: More than three times a week     Gets together: More than three times a week     Attends [employer] service: 1 to 4 times per year     Active member of club or organization: No     Attends meetings of clubs or organizations: Never     Relationship status: Never    ? Intimate partner violence     Fear of current or ex partner: No     Emotionally abused: No     Physically abused: No     Forced sexual activity: No   Other Topics Concern   ? None   Social History Narrative    6/2020:        - Radha bertramalexandre. Born in [facility]. [place] He is not yet a [place] citizen.    - Never  ; no children.    - Established care with Dr. Ortiz at [facility] in June 2020.        FAMILY HISTORY    - His parents and siblings remain in [facility]        LIVING SITUATION    - Lives in a house with his grandparents, uncle, and several friends    - Household of 6        LANGUAGE(S) SPOKEN    - Radha        EMPLOYMENT    - Past employment:  at a restaurant    - Current employment: None        EDUCATION    - None        Muslim    - Anglican       PHYSICAL EXAM    VITALS:    Patient Vitals for the past 24 hrs:   BP Temp Temp src Pulse Resp SpO2 Weight   07/10/21 1803 103/55 -- -- -- -- -- --   07/10/21 1800 -- -- -- 79 -- 98 % --   07/10/21 1745 107/69 -- -- 76 -- 99 % --   07/10/21 1730 104/67 -- -- 79 -- 99 % --   07/10/21 1715 105/70 -- -- 78 -- 100 % --   07/10/21 1703 -- 97.6  F (36.4  C) Oral -- -- -- --   07/10/21 1700 115/76 -- -- 80 -- 100 % --   07/10/21 1657 120/77 -- -- 80 -- 100 % --   07/10/21 1446 118/73 98.5  F (36.9  C) Oral 88 16 96 % 131 lb (59.4 kg)     GENERAL: Awake, alert.  In no acute distress.   HEENT: Normocephalic, atraumatic.  Pupils equal, round and reactive.  Conjunctiva normal.   NECK: No stridor.  PULMONARY: Symmetrical breath sounds without distress.  Lungs clear to auscultation bilaterally without wheezes, rhonchi or rales.  CARDIO: Regular rate and rhythm.  No significant murmur, rub or gallop.  Radial pulses strong and symmetrical.  ABDOMINAL: Abdomen soft, non-distended and non-tender to palpation.   EXTREMITIES: No lower extremity swelling or edema.    NEURO: Alert and oriented to person, place and time.  Cranial nerves grossly intact.  No focal motor deficit.  PSYCH: Normal mood and affect.  SKIN: No rashes.        LAB:  All pertinent labs reviewed and interpreted.  Results for orders placed or performed during the hospital encounter of 07/10/21   HM2 (CBC W/O DIFF)   Result Value Ref Range    WBC 8.5 4.0 - 11.0 thou/uL    RBC 4.94 4.40 - 6.20 mill/uL     Hemoglobin 15.5 14.0 - 18.0 g/dL    Hematocrit 45.6 40.0 - 54.0 %    MCV 92 80 - 100 fL    MCH 31.4 27.0 - 34.0 pg    MCHC 34.0 32.0 - 36.0 g/dL    RDW 11.6 11.0 - 14.5 %    Platelets 233 140 - 440 thou/uL    MPV 9.7 8.5 - 12.5 fL   Basic Metabolic Panel   Result Value Ref Range    Sodium 140 136 - 145 mmol/L    Potassium 4.3 3.5 - 5.0 mmol/L    Chloride 102 98 - 107 mmol/L    CO2 27 22 - 31 mmol/L    Anion Gap, Calculation 11 5 - 18 mmol/L    Glucose 253 (H) 70 - 125 mg/dL    Calcium 9.4 8.5 - 10.5 mg/dL    BUN 12 8 - 22 mg/dL    Creatinine 1.00 0.70 - 1.30 mg/dL    GFR MDRD Af Amer >60 >60 mL/min/1.73m2    GFR MDRD Non Af Amer >60 >60 mL/min/1.73m2   Hepatic Profile   Result Value Ref Range    Bilirubin, Total 1.1 (H) 0.0 - 1.0 mg/dL    Bilirubin, Direct 0.3 <=0.5 mg/dL    Protein, Total 7.5 6.0 - 8.0 g/dL    Albumin 4.1 3.5 - 5.0 g/dL    Alkaline Phosphatase 55 45 - 120 U/L    AST 36 0 - 40 U/L    ALT 71 (H) 0 - 45 U/L   Lipase   Result Value Ref Range    Lipase 33 0 - 52 U/L   POCT Glucose    Specimen: Blood   Result Value Ref Range    Glucose 226 (H) 70 - 139 mg/dL         I, Jim Johnston, am serving as a scribe to document services personally performed by Dr. Baum based on my observation and the provider's statements to me. I, Yvette Baum MD attest that Jim Johnston is acting in a scribe capacity, has observed my performance of the services and has documented them in accordance with my direction.       Yvette Baum MD  07/10/21 2016

## 2021-07-11 NOTE — PROGRESS NOTES
Progress Notes by Loc Carvalho DO at 7/10/2021  6:37 PM     Author: Loc Carvalho DO Service: Emergency Medicine Author Type: Physician    Filed: 7/11/2021 12:29 AM Date of Service: 7/10/2021  6:37 PM Status: Signed    : Loc Carvalho DO (Physician)       Results are reviewed and at this time require no intervention

## 2021-07-12 VITALS — WEIGHT: 131 LBS | BODY MASS INDEX: 23.96 KG/M2

## 2021-07-13 ENCOUNTER — PATIENT OUTREACH (OUTPATIENT)
Dept: CARE COORDINATION | Facility: CLINIC | Age: 41
End: 2021-07-13

## 2021-07-13 NOTE — PROGRESS NOTES
Clinic Care Coordination Contact  [facility]/Voicemail    Clinical Data: Care Coordinator Outreach  Outreach attempted x 2.  Left message on patient's voicemail with call back information and requested return call.  Plan: Care Coordinator will try to reach patient again in 1 month.    CHW attempted outreach 1x unable to reach in E Epic and goal added for RN to follow up on 7/30/2021 for medications.

## 2021-07-15 ENCOUNTER — HOSPITAL ENCOUNTER (EMERGENCY)
Facility: HOSPITAL | Age: 41
Discharge: HOME | End: 2021-07-15
Attending: EMERGENCY MEDICINE | Admitting: EMERGENCY MEDICINE
Payer: COMMERCIAL

## 2021-07-15 ENCOUNTER — HOSPITAL ENCOUNTER (EMERGENCY)
Dept: CT IMAGING | Facility: HOSPITAL | Age: 41
End: 2021-07-15
Attending: EMERGENCY MEDICINE
Payer: COMMERCIAL

## 2021-07-15 VITALS
OXYGEN SATURATION: 100 % | BODY MASS INDEX: 25.61 KG/M2 | SYSTOLIC BLOOD PRESSURE: 98 MMHG | DIASTOLIC BLOOD PRESSURE: 61 MMHG | WEIGHT: 140 LBS | RESPIRATION RATE: 20 BRPM | TEMPERATURE: 97.8 F | HEART RATE: 83 BPM

## 2021-07-15 DIAGNOSIS — R11.2 NAUSEA VOMITING AND DIARRHEA: ICD-10-CM

## 2021-07-15 DIAGNOSIS — R19.7 NAUSEA VOMITING AND DIARRHEA: ICD-10-CM

## 2021-07-15 LAB
ALBUMIN SERPL-MCNC: 4.5 G/DL (ref 3.5–5)
ALP SERPL-CCNC: 60 U/L (ref 45–120)
ALT SERPL W P-5'-P-CCNC: 87 U/L (ref 0–45)
ANION GAP SERPL CALCULATED.3IONS-SCNC: 11 MMOL/L (ref 5–18)
AST SERPL W P-5'-P-CCNC: 48 U/L (ref 0–40)
BASOPHILS # BLD AUTO: 0.1 10E3/UL (ref 0–0.2)
BASOPHILS NFR BLD AUTO: 0 %
BILIRUB SERPL-MCNC: 1.7 MG/DL (ref 0–1)
BUN SERPL-MCNC: 13 MG/DL (ref 8–22)
CALCIUM SERPL-MCNC: 9.8 MG/DL (ref 8.5–10.5)
CHLORIDE BLD-SCNC: 103 MMOL/L (ref 98–107)
CO2 SERPL-SCNC: 27 MMOL/L (ref 22–31)
CREAT SERPL-MCNC: 1.14 MG/DL (ref 0.7–1.3)
EOSINOPHIL # BLD AUTO: 0.3 10E3/UL (ref 0–0.7)
EOSINOPHIL NFR BLD AUTO: 2 %
ERYTHROCYTE [DISTWIDTH] IN BLOOD BY AUTOMATED COUNT: 11.6 % (ref 10–15)
GFR SERPL CREATININE-BSD FRML MDRD: 80 ML/MIN/1.73M2
GLUCOSE BLD-MCNC: 122 MG/DL (ref 70–125)
GLUCOSE BLDC GLUCOMTR-MCNC: 93 MG/DL (ref 70–125)
HCT VFR BLD AUTO: 51.7 % (ref 40–53)
HGB BLD-MCNC: 17.4 G/DL (ref 13.3–17.7)
IMM GRANULOCYTES # BLD: 0.1 10E3/UL
IMM GRANULOCYTES NFR BLD: 1 %
LIPASE SERPL-CCNC: 28 U/L (ref 0–52)
LYMPHOCYTES # BLD AUTO: 0.6 10E3/UL (ref 0.8–5.3)
LYMPHOCYTES NFR BLD AUTO: 3 %
MCH RBC QN AUTO: 31.6 PG (ref 26.5–33)
MCHC RBC AUTO-ENTMCNC: 33.7 G/DL (ref 31.5–36.5)
MCV RBC AUTO: 94 FL (ref 78–100)
MONOCYTES # BLD AUTO: 1.5 10E3/UL (ref 0–1.3)
MONOCYTES NFR BLD AUTO: 7 %
NEUTROPHILS # BLD AUTO: 17.9 10E3/UL (ref 1.6–8.3)
NEUTROPHILS NFR BLD AUTO: 87 %
NRBC # BLD AUTO: 0 10E3/UL
NRBC BLD AUTO-RTO: 0 /100
PLATELET # BLD AUTO: 238 10E3/UL (ref 150–450)
POTASSIUM BLD-SCNC: 5 MMOL/L (ref 3.5–5)
PROT SERPL-MCNC: 8.5 G/DL (ref 6–8)
RBC # BLD AUTO: 5.51 10E6/UL (ref 4.4–5.9)
SODIUM SERPL-SCNC: 141 MMOL/L (ref 136–145)
TROPONIN I SERPL-MCNC: <0.01 NG/ML (ref 0–0.29)
WBC # BLD AUTO: 20.5 10E3/UL (ref 4–11)

## 2021-07-15 PROCEDURE — 99285 EMERGENCY DEPT VISIT HI MDM: CPT | Mod: 25

## 2021-07-15 PROCEDURE — 85025 COMPLETE CBC W/AUTO DIFF WBC: CPT | Performed by: STUDENT IN AN ORGANIZED HEALTH CARE EDUCATION/TRAINING PROGRAM

## 2021-07-15 PROCEDURE — 96374 THER/PROPH/DIAG INJ IV PUSH: CPT

## 2021-07-15 PROCEDURE — 250N000011 HC RX IP 250 OP 636: Performed by: EMERGENCY MEDICINE

## 2021-07-15 PROCEDURE — 36592 COLLECT BLOOD FROM PICC: CPT | Performed by: STUDENT IN AN ORGANIZED HEALTH CARE EDUCATION/TRAINING PROGRAM

## 2021-07-15 PROCEDURE — 36415 COLL VENOUS BLD VENIPUNCTURE: CPT | Performed by: STUDENT IN AN ORGANIZED HEALTH CARE EDUCATION/TRAINING PROGRAM

## 2021-07-15 PROCEDURE — 82040 ASSAY OF SERUM ALBUMIN: CPT | Performed by: STUDENT IN AN ORGANIZED HEALTH CARE EDUCATION/TRAINING PROGRAM

## 2021-07-15 PROCEDURE — 96361 HYDRATE IV INFUSION ADD-ON: CPT

## 2021-07-15 PROCEDURE — 258N000003 HC RX IP 258 OP 636: Performed by: EMERGENCY MEDICINE

## 2021-07-15 PROCEDURE — 74177 CT ABD & PELVIS W/CONTRAST: CPT

## 2021-07-15 PROCEDURE — 84484 ASSAY OF TROPONIN QUANT: CPT | Performed by: STUDENT IN AN ORGANIZED HEALTH CARE EDUCATION/TRAINING PROGRAM

## 2021-07-15 PROCEDURE — 83690 ASSAY OF LIPASE: CPT | Performed by: STUDENT IN AN ORGANIZED HEALTH CARE EDUCATION/TRAINING PROGRAM

## 2021-07-15 RX ORDER — ONDANSETRON 2 MG/ML
8 INJECTION INTRAMUSCULAR; INTRAVENOUS ONCE
Status: COMPLETED | OUTPATIENT
Start: 2021-07-15 | End: 2021-07-15

## 2021-07-15 RX ORDER — PROCHLORPERAZINE MALEATE 10 MG
10 TABLET ORAL EVERY 6 HOURS PRN
Qty: 20 TABLET | Refills: 0 | Status: SHIPPED | OUTPATIENT
Start: 2021-07-15 | End: 2021-10-18

## 2021-07-15 RX ORDER — IOPAMIDOL 755 MG/ML
100 INJECTION, SOLUTION INTRAVASCULAR ONCE
Status: COMPLETED | OUTPATIENT
Start: 2021-07-15 | End: 2021-07-15

## 2021-07-15 RX ADMIN — IOPAMIDOL 100 ML: 755 INJECTION, SOLUTION INTRAVENOUS at 20:58

## 2021-07-15 RX ADMIN — SODIUM CHLORIDE 1000 ML: 9 INJECTION, SOLUTION INTRAVENOUS at 21:07

## 2021-07-15 RX ADMIN — ONDANSETRON 8 MG: 2 INJECTION INTRAMUSCULAR; INTRAVENOUS at 21:11

## 2021-07-15 ASSESSMENT — ENCOUNTER SYMPTOMS
DIFFICULTY URINATING: 0
VOMITING: 1
BLOOD IN STOOL: 0
ABDOMINAL DISTENTION: 1
ROS GI COMMENTS: NEGATIVE FOR BLOOD IN VOMIT
ABDOMINAL PAIN: 1
EYE REDNESS: 0
ARTHRALGIAS: 0
CHILLS: 0
FEVER: 0
NECK STIFFNESS: 0
CONFUSION: 0
COUGH: 1
SHORTNESS OF BREATH: 1
HEADACHES: 0
COLOR CHANGE: 0
NAUSEA: 1
DIARRHEA: 1

## 2021-07-15 NOTE — ED TRIAGE NOTES
"Patient reported vomiting, diarrhea and SOB that started today. He stated \"I am having these symptoms because my blood sugar is high.\"Blood sugar in triage in 93. Phe denies chest pain or abdominal pain. He is crying in triage.   "

## 2021-07-16 ENCOUNTER — PATIENT OUTREACH (OUTPATIENT)
Dept: CARE COORDINATION | Facility: CLINIC | Age: 41
End: 2021-07-16

## 2021-07-16 NOTE — LETTER
formerly [facility]  Complex Care Plan  About Me:    Patient Name:  Pablo Russo    YOB: 1980  Age:         40 year old   [place] MRN:    [id number] Telephone Information:  Home Phone [phone]   Mobile [phone]       Address:  [address]  [place] [address] Email address:  No e-mail address on record      Emergency Contact(s)    Name Relationship Lgl Grd Work Phone Home Phone Mobile Phone           Primary language:  Radha     needed? Yes   [facility] [employer]:  [phone] op. 1  Other communication barriers:    Preferred Method of Communication:     Current living arrangement:    Mobility Status/ Medical Equipment:      Health Maintenance  Health Maintenance Reviewed:      My Access Plan  Medical Emergency 911   Primary Clinic Line [facility] [phone]   24 Hour Appointment Line [phone] or  [phone] ([phone]) (toll-free)   24 Hour Nurse Line [phone] (toll-free)   Preferred Urgent Care     Preferred Hospital     Preferred Pharmacy [id number] - [place], [place] - [address] AT [facility] OF [address]     Behavioral Health Crisis Line The National Suicide Prevention Lifeline at 1-875.607.2767 or 911             My Care Team Members  Patient Care Team       Relationship Specialty Notifications Start End    Peggy Ortiz MD PCP - General   6/15/20     Phone: [phone] Fax: [phone]         [address] [place] [address]    Charlene Ovalle, PharmD Pharmacist Pharmacist  5/20/21     Phone: [phone]          [facility] [address] [place] [address]    Glenys Lantigua, RN Lead Care Coordinator Primary Care - [facility] Admissions 5/26/21     Dilan Clark Community Health Worker Primary Care - [facility] Admissions 5/26/21     Peggy Ortiz MD Assigned PCP   6/16/21     Phone: [phone] Fax: [phone]         [address] [place] [address]             My Care Plans  Self Management and Treatment Plan  Goals and (Comments)  Goals        General     Medical (pt-stated)      Notes - Note created  7/13/2021 11:24 AM by Dilan Clark     Goal Statement: I would like [facility] RN to teach me how to take my meds, insulin and glucometer teaching correctly the next 90 days.      Date Goal set: 5/26/2201  Barriers: language barrier, lack of knowledge, non-compliance  Strengths: agrees to work on goal  Date to Achieve By: 8/26/2021  Patient expressed understanding of goal: Yes     Action steps to achieve this goal:  1. I will attend my appt with [facility] RN on 6/11/2021 at 10:00am.   2. I will take my meds and insulin as prescribed   3. I will call [facility] RN with concerns or questions.              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    Care Coordination Start Date: 5/26/2021   Frequency of Care Coordination: 6 weeks   Form Last Updated: 07/20/2021

## 2021-07-16 NOTE — ED PROVIDER NOTES
EMERGENCY DEPARTMENT ENCOUNTER     NAME: Pablo Russo   AGE: 40 year old male   YOB: 1980   MRN: [id number]   EVALUATION DATE & TIME: 7/15/2021  7:44 PM   PCP: Peggy Ortiz     Chief Complaint   Patient presents with     Shortness of Breath     Nausea, Vomiting, & Diarrhea   :    FINAL IMPRESSION       1. Nausea vomiting and diarrhea           ED COURSE & MEDICAL DECISION MAKING      Pertinent Labs & Imaging studies reviewed. (See chart for details)   40 year old male  presents to the Emergency Department for evaluation of 1 month of vomiting, now with 1 day of diarrhea as well. Initial Vitals Reviewed. Initial exam notable for generally well-appearing patient with normal vitals, generally benign abdominal exam.  He does have a history of chronic hepatitis, and I suspect a nonsurgical pathology as he is nontoxic-appearing.  Labs were obtained including CBC, chemistry, LFTs and lipase and interestingly he does have a leukocytosis of 20 which is slightly higher than I would expect with a typical gastroenteritis.  With this in a language barrier, we decided to do a CT scan of his abdomen and pelvis.  He was treated symptomatically with IV fluids and antiemetics here and has not had any vomiting during his ED course.  With 1 day of diarrhea I am not suspecting something like C. difficile and I do not think he needs stool testing yet.  CT is consistent with a general enterocolitis which probably suggest something like gastroenteritis.  This is a generally benign finding, and at this time patient is comfortable with discharge with a prescription for antiemetics and follow-up instructions with PCP.          8:40 PM I heard medical student's report of the patient.  At the conclusion of the encounter I discussed the results of all of the tests and the disposition. The questions were answered. The patient or family acknowledged understanding and was agreeable with the care plan.   PPE: Provider wore gloves, N95  "mask, eye protection, surgical cap, and paper mask.          MEDICATIONS GIVEN IN THE EMERGENCY:   Medications   ondansetron (ZOFRAN) injection 8 mg (8 mg Intravenous Given 7/15/21 2111)   0.9% sodium chloride BOLUS (1,000 mLs Intravenous New Bag 7/15/21 2107)      NEW PRESCRIPTIONS STARTED AT TODAY'S ER VISIT   New Prescriptions    No medications on file     ================================================================   HISTORY OF PRESENT ILLNESS       Patient information was obtained from: The patient   Use of Intrepreter: Yes   Language: Radha Russo is a 40 year old male with history of hepatitis who presents to the ED via walk-in for evaluation of vomiting, diarrhea, and shortness of breath.     The patient reports he has had chronic nausea for the past month and has been prescribed Zofran but his insurance would not cover it and thus he was not able to obtain any. This morning (7/15), he had an episode of vomiting and throughout the day he has had multiple episodes of watery diarrhea which prompted him to present to the ED. He also endorses abdominal distension with associated shortness of breath and abdominal pain. He says he can breathe easier when he puts a pressure on his abdomen which feels like he is \"moving an air bubble.\" The patient also reports having a chronic cough. The patient denies blood in stool, blood in vomit, fever, chills, and any other symptoms or complaints at this time.       ================================================================    REVIEW OF SYSTEMS       Review of Systems   Constitutional: Negative for chills and fever.   HENT: Negative for congestion.    Eyes: Negative for redness.   Respiratory: Positive for cough (chronic) and shortness of breath.    Cardiovascular: Negative for chest pain.   Gastrointestinal: Positive for abdominal distention, abdominal pain, diarrhea, nausea and vomiting (1x). Negative for blood in stool.        Negative for blood in vomit " "  Genitourinary: Negative for difficulty urinating.   Musculoskeletal: Negative for arthralgias and neck stiffness.   Skin: Negative for color change.   Neurological: Negative for headaches.   Psychiatric/Behavioral: Negative for confusion.   All other systems reviewed and are negative.      PAST HISTORY     PAST MEDICAL HISTORY:   Past Medical History:   Diagnosis Date     Immune to hepatitis B 2/12/2020 2/11/2020 - Immune from prior infection     Immune to hepatitis B 2/12/2020 2/11/2020 - Immune from prior infection      PAST SURGICAL HISTORY:   No past surgical history on file.   CURRENT MEDICATIONS:   ACCU-CHEK FASTCLIX LANCET DRUM  ACCU-CHEK GUIDE TEST STRIPS strips  atorvastatin (LIPITOR) 20 MG tablet  blood-glucose meter Misc  famotidine (PEPCID) 20 MG tablet  fluticasone propionate (FLONASE) 50 mcg/actuation nasal spray  generic lancets  insulin glargine (LANTUS SOLOSTAR U-100 INSULIN) 100 unit/mL (3 mL) pen  meloxicam (MOBIC) 15 MG tablet  metFORMIN (GLUCOPHAGE) 1000 MG tablet  olopatadine (PATANOL) 0.1 % ophthalmic solution  ondansetron (ZOFRAN ODT) 4 MG disintegrating tablet  pen needle, diabetic (BD ULTRA-FINE MAHSA PEN NEEDLE) 32 gauge x 5/32\" Ndle  QUEtiapine (SEROQUEL) 50 MG tablet  sertraline (ZOLOFT) 50 MG tablet  sildenafil (REVATIO) 20 mg tablet      ALLERGIES:   No Known Allergies   FAMILY HISTORY:   No family history on file.   SOCIAL HISTORY:   Social History     Socioeconomic History     Marital status: Single     Spouse name: Not on file     Number of children: 0     Years of education: Not on file     Highest education level: Not on file   Occupational History     Not on file   Tobacco Use     Smoking status: Former Smoker     Smokeless tobacco: Never Used   Substance and Sexual Activity     Alcohol use: Yes     Drug use: Not Currently     Sexual activity: Not Currently     Partners: Female   Other Topics Concern     Not on file   Social History Narrative    6/2020:    - [place] refugee. " Born in [place]. [place] He is not yet a [place] citizen.  - Never ; no children.  - Established care with Dr. Ortiz at [facility] in June 2020.    FAMILY HISTORY  - His parents and siblings remain  in [place]    LIVING SITUATION  - Lives in a house with his grandparents, uncle, and several friends  - Household of 6    LANGUAGE(S) SPOKEN  - Radha    EMPLOYMENT  - Past employment:  at a restaurant  - Current employment: None    EDUCATION  - N one    Catholic  - Faith     Social Determinants of Health     Financial Resource Strain:      Difficulty of Paying Living Expenses:    Food Insecurity:      Worried About Running Out of Food in the Last Year:      Ran Out of Food in the Last Year:    Transportation Needs:      Lack of Transportation (Medical):      Lack of Transportation (Non-Medical):    Physical Activity:      Days of Exercise per Week:      Minutes of Exercise per Session:    Stress:      Feeling of Stress :    Social Connections:      Frequency of Communication with Friends and Family:      Frequency of Social Gatherings with Friends and Family:      Attends Confucianism Services:      Active Member of Clubs or Organizations:      Attends Club or Organization Meetings:      Marital Status:    Intimate Partner Violence:      Fear of Current or Ex-Partner:      Emotionally Abused:      Physically Abused:      Sexually Abused:         VITALS  Patient Vitals for the past 24 hrs:   BP Temp Pulse Resp SpO2 Weight   07/15/21 1705 113/64 97.8  F (36.6  C) 78 20 97 % 63.5 kg (140 lb)        ================================================================    PHYSICAL EXAM     VITAL SIGNS: /64   Pulse 78   Temp 97.8  F (36.6  C)   Resp 20   Wt 63.5 kg (140 lb)   SpO2 97%   BMI 25.61 kg/m     Constitutional:  Awake, no acute distress   HENT:  Atraumatic, oropharynx without exudate or erythema, membranes moist  Lymph:  No adenopathy  Eyes: EOM intact, PERRL, no  injection  Neck: Supple  Respiratory:  Clear to auscultation bilaterally, no wheezes or crackles   Cardiovascular:  Regular rate and rhythm, single S1 and S2   GI:  Soft, nontender, nondistended, no rebound or guarding   Musculoskeletal:  Moves all extremities, no lower extremity edema, no deformities    Skin:  Warm, dry  Neurologic:  Alert and oriented x3, no focal deficits noted       ================================================================  LAB       All pertinent labs reviewed and interpreted.   Labs Ordered and Resulted from Time of ED Arrival Up to the Time of Departure from the ED   COMPREHENSIVE METABOLIC PANEL - Abnormal; Notable for the following components:       Result Value    AST 48 (*)     ALT 87 (*)     Protein Total 8.5 (*)     Bilirubin Total 1.7 (*)     All other components within normal limits   CBC WITH PLATELETS AND DIFFERENTIAL - Abnormal; Notable for the following components:    WBC Count 20.5 (*)     Absolute Neutrophils 17.9 (*)     Absolute Lymphocytes 0.6 (*)     Absolute Monocytes 1.5 (*)     Absolute Immature Granulocytes 0.1 (*)     All other components within normal limits   GLUCOSE BY METER - Normal   LIPASE - Normal   TROPONIN I - Normal   CBC WITH PLATELETS & DIFFERENTIAL    Narrative:     The following orders were created for panel order CBC with platelets differential.                  Procedure                               Abnormality         Status                                     ---------                               -----------         ------                                     CBC with platelets and d...[[id number]]  Abnormal            Final result                                                 Please view results for these tests on the individual orders.        ===============================================================  RADIOLOGY       Reviewed all pertinent imaging. Please see official radiology report.   CT Abdomen Pelvis w Contrast   Final Result    IMPRESSION:    1.  Distal small bowel and colon are predominantly fluid-filled with borderline mucosal hyperenhancement and mural thickening. Findings are nonspecific and could represent normal physiologic appearance with a mild nonspecific enterocolitis possible.   2.  Abdomen and pelvis otherwise unremarkable.            ================================================================  EKG         I have independently reviewed and interpreted the EKG(s) documented above.     ================================================================  PROCEDURES         I, Lyndsay Ni, am serving as a scribe to document services personally performed by Dr. Irving based on my observation and the provider's statements to me. I, Veronica Irving MD attest that Lyndsay Last is acting in a scribe capacity, has observed my performance of the services and has documented them in accordance with my direction.   Veronica Irving M.D.   Emergency Medicine   [facility] EMERGENCY DEPARTMENT  [facility] [facility] [address]  [phone]  Dept: [phone]       Veronica Irving MD  07/15/21 8396

## 2021-07-20 ENCOUNTER — TELEPHONE (OUTPATIENT)
Dept: FAMILY MEDICINE | Facility: CLINIC | Age: 41
End: 2021-07-20

## 2021-07-20 NOTE — PROGRESS NOTES
CC received notification of Emergency Room visit.  ER visit occurred on 7/15/2021 at [facility] with Dx of n/v.    CC contacted Unable to reach patient via phon ex2. Left a vm and reviewed discharge summary.  Member has a follow-up appointment with PCP: No: Offered Assistance with setting up a follow up appointment  Member has had a change in condition: No  New referrals placed: No  Home Visit Needed: No  Care plan reviewed and updated.  PCP notified of ED visit via EMR.    - CCC attempted to call patient x2 today but had to leave a vm.   - CHW to try calling patient to reschedule ED follow up.

## 2021-07-20 NOTE — TELEPHONE ENCOUNTER
Marimar Lopez contacted [facility] on 07/20/21 and left a message. If patient calls back please schedule appointment for DM.

## 2021-07-28 NOTE — TELEPHONE ENCOUNTER
Discussed in case review and [facility] RN will follow up one more time.

## 2021-08-04 ENCOUNTER — TELEPHONE (OUTPATIENT)
Dept: URGENT CARE | Facility: URGENT CARE | Age: 41
End: 2021-08-04

## 2021-08-04 ENCOUNTER — NURSE TRIAGE (OUTPATIENT)
Dept: NURSING | Facility: CLINIC | Age: 41
End: 2021-08-04

## 2021-08-04 ENCOUNTER — OFFICE VISIT (OUTPATIENT)
Dept: FAMILY MEDICINE | Facility: CLINIC | Age: 41
End: 2021-08-04
Payer: COMMERCIAL

## 2021-08-04 VITALS
OXYGEN SATURATION: 97 % | RESPIRATION RATE: 16 BRPM | HEART RATE: 82 BPM | SYSTOLIC BLOOD PRESSURE: 122 MMHG | DIASTOLIC BLOOD PRESSURE: 81 MMHG

## 2021-08-04 DIAGNOSIS — R11.0 NAUSEA: Primary | ICD-10-CM

## 2021-08-04 DIAGNOSIS — E11.69 TYPE 2 DIABETES MELLITUS WITH OTHER SPECIFIED COMPLICATION, WITH LONG-TERM CURRENT USE OF INSULIN (H): ICD-10-CM

## 2021-08-04 DIAGNOSIS — B18.2 CHRONIC HEPATITIS C WITHOUT HEPATIC COMA (H): ICD-10-CM

## 2021-08-04 DIAGNOSIS — Z79.4 TYPE 2 DIABETES MELLITUS WITH OTHER SPECIFIED COMPLICATION, WITH LONG-TERM CURRENT USE OF INSULIN (H): ICD-10-CM

## 2021-08-04 LAB
ALBUMIN SERPL-MCNC: 3.7 G/DL (ref 3.5–5)
ALP SERPL-CCNC: 58 U/L (ref 45–120)
ALT SERPL W P-5'-P-CCNC: 72 U/L (ref 0–45)
ANION GAP SERPL CALCULATED.3IONS-SCNC: 10 MMOL/L (ref 5–18)
AST SERPL W P-5'-P-CCNC: 29 U/L (ref 0–40)
BASOPHILS # BLD AUTO: 0.1 10E3/UL (ref 0–0.2)
BASOPHILS NFR BLD AUTO: 1 %
BILIRUB SERPL-MCNC: 1.3 MG/DL (ref 0–1)
BUN SERPL-MCNC: 11 MG/DL (ref 8–22)
CALCIUM SERPL-MCNC: 9.2 MG/DL (ref 8.5–10.5)
CHLORIDE BLD-SCNC: 101 MMOL/L (ref 98–107)
CO2 SERPL-SCNC: 28 MMOL/L (ref 22–31)
CREAT SERPL-MCNC: 0.98 MG/DL (ref 0.7–1.3)
EOSINOPHIL # BLD AUTO: 0.7 10E3/UL (ref 0–0.7)
EOSINOPHIL NFR BLD AUTO: 9 %
ERYTHROCYTE [DISTWIDTH] IN BLOOD BY AUTOMATED COUNT: 11.2 % (ref 10–15)
GFR SERPL CREATININE-BSD FRML MDRD: >90 ML/MIN/1.73M2
GLUCOSE BLD-MCNC: 286 MG/DL (ref 70–125)
GLUCOSE BLD-MCNC: 287 MG/DL (ref 79–116)
HCT VFR BLD AUTO: 44.1 % (ref 40–53)
HGB BLD-MCNC: 15.4 G/DL (ref 13.3–17.7)
IMM GRANULOCYTES # BLD: 0 10E3/UL
IMM GRANULOCYTES NFR BLD: 0 %
LYMPHOCYTES # BLD AUTO: 1.5 10E3/UL (ref 0.8–5.3)
LYMPHOCYTES NFR BLD AUTO: 20 %
MCH RBC QN AUTO: 31.3 PG (ref 26.5–33)
MCHC RBC AUTO-ENTMCNC: 34.9 G/DL (ref 31.5–36.5)
MCV RBC AUTO: 90 FL (ref 78–100)
MONOCYTES # BLD AUTO: 0.4 10E3/UL (ref 0–1.3)
MONOCYTES NFR BLD AUTO: 5 %
NEUTROPHILS # BLD AUTO: 4.8 10E3/UL (ref 1.6–8.3)
NEUTROPHILS NFR BLD AUTO: 65 %
PLATELET # BLD AUTO: 213 10E3/UL (ref 150–450)
POTASSIUM BLD-SCNC: 4 MMOL/L (ref 3.5–5)
PROT SERPL-MCNC: 7.2 G/DL (ref 6–8)
RBC # BLD AUTO: 4.92 10E6/UL (ref 4.4–5.9)
SODIUM SERPL-SCNC: 139 MMOL/L (ref 136–145)
WBC # BLD AUTO: 7.5 10E3/UL (ref 4–11)

## 2021-08-04 PROCEDURE — 80053 COMPREHEN METABOLIC PANEL: CPT | Performed by: PHYSICIAN ASSISTANT

## 2021-08-04 PROCEDURE — 85025 COMPLETE CBC W/AUTO DIFF WBC: CPT | Performed by: PHYSICIAN ASSISTANT

## 2021-08-04 PROCEDURE — 99214 OFFICE O/P EST MOD 30 MIN: CPT | Performed by: PHYSICIAN ASSISTANT

## 2021-08-04 PROCEDURE — 82947 ASSAY GLUCOSE BLOOD QUANT: CPT | Mod: 59 | Performed by: PHYSICIAN ASSISTANT

## 2021-08-04 PROCEDURE — 36415 COLL VENOUS BLD VENIPUNCTURE: CPT | Performed by: PHYSICIAN ASSISTANT

## 2021-08-04 RX ORDER — PROCHLORPERAZINE MALEATE 10 MG
10 TABLET ORAL EVERY 6 HOURS PRN
Qty: 30 TABLET | Refills: 0 | Status: SHIPPED | OUTPATIENT
Start: 2021-08-04 | End: 2021-10-18

## 2021-08-04 NOTE — TELEPHONE ENCOUNTER
Having nausea and wants to throw up.    Has Diabetes.Patient states he does not know how to check his BS.  He states he adjusts his insulin dose by how he feels.    Patient reports he wants an appointment with MD , and no availability today. He was   Advised to got to the [facility], and given the address.      Neris Dey, RN RN  Care Connection Triage/refill nurse          Reason for Disposition    Patient sounds very sick or weak to the triager    Protocols used: DIABETES - HIGH BLOOD SUGAR-A-OH

## 2021-08-04 NOTE — PATIENT INSTRUCTIONS
I will call with results of comprehensive metabolic profile.      Please follow up with Dr. Ortiz for recheck this week.

## 2021-08-04 NOTE — PROGRESS NOTES
Assessment & Plan     1. Nausea    - Comprehensive metabolic panel (BMP + Alb, Alk Phos, ALT, AST, Total. Bili, TP); Future  - CBC with platelets and differential; Future  - Comprehensive metabolic panel (BMP + Alb, Alk Phos, ALT, AST, Total. Bili, TP)  - CBC with platelets and differential  - Glucose, whole blood; Future  - Glucose, whole blood  - prochlorperazine (COMPAZINE) 10 MG tablet; Take 1 tablet (10 mg) by mouth every 6 hours as needed for nausea or vomiting  Dispense: 30 tablet; Refill: 0    2. Chronic hepatitis C without hepatic coma (H)  Reviewed results of lab with pt and not likely cause of nausea, LFTS improving, bili improved from previous as well, mildly elevated   - Comprehensive metabolic panel (BMP + Alb, Alk Phos, ALT, AST, Total. Bili, TP); Future  - CBC with platelets and differential; Future  - Comprehensive metabolic panel (BMP + Alb, Alk Phos, ALT, AST, Total. Bili, TP)  - CBC with platelets and differential  - Glucose, whole blood; Future  - Glucose, whole blood    3. Type 2 diabetes mellitus with other specified complication, with long-term current use of insulin (H)  Reassured of normal CBC, glucose is elevated.  Disucssed follow-up with pcp as well as referred to diabetes educator for assistance with educating on monitoring   - CBC with platelets and differential; Future  - CBC with platelets and differential  - Glucose, whole blood; Future  - Glucose, whole blood  - [facility] Adult Diabetes Educator Referral; Future    ALYSHA Browne [facility] is a 40 year old male who presents to clinic today for the following health issues:  Chief Complaint   Patient presents with     Blood Sugar Problem     PCP told him to come in and check glucose, nausea, NO chest pain     HPI    Pt presents to urgent care with concerns re: nausea.  He has a hx of diabetes (uncontrolled on insulin and metformin) and chronic hepatitis C.  He has had some chronic  intermittant nausea.  Has been perscribed zofran which was not covered, then compazine.  He is told he needs his pcp to perscribe that due to insurance restrictions.    He denies any fevers, chills, vomiting. Does feel his skin is slightly more yellow. No pain in the abdomen.  Was seen in the ED 2 weeks ago for same and comprehensive work up was unremakrable but told to follow-up with pcp.  He called today and not able to get in, sent to Urgent care due to concern for his diabetes.  He does not monitor BS has the supplies but states he does not know how.  .        Review of Systems  Constitutional, HEENT, cardiovascular, pulmonary, gi and gu systems are negative, except as otherwise noted.      Objective    /81   Pulse 82   Resp 16   SpO2 97%   Physical Exam     Pt is in no acute distress and appears well  Ears patent B:  TM s intact, non-injected. All land marks easily visibile.   No scleral icterus      Pharynx: non erythematous, tonsils non hypertrophied, No exudate   Neck supple: no adenopathy  Lungs: CTA  Heart: RRR, no murmur, no thrills or heaves   Ext: no edema  Skin: no rashes , difficult to identify jaundice by color of skin but none under tongue or eyes.   Abdomen: BS active, soft non-distended, non-tender to light or deep palpation. No rebound or peritoneal signs. No masses or hsm.     Results for orders placed or performed in visit on 08/04/21   Comprehensive metabolic panel (BMP + Alb, Alk Phos, ALT, AST, Total. Bili, TP)     Status: Abnormal   Result Value Ref Range    Sodium 139 136 - 145 mmol/L    Potassium 4.0 3.5 - 5.0 mmol/L    Chloride 101 98 - 107 mmol/L    Carbon Dioxide (CO2) 28 22 - 31 mmol/L    Anion Gap 10 5 - 18 mmol/L    Urea Nitrogen 11 8 - 22 mg/dL    Creatinine 0.98 0.70 - 1.30 mg/dL    Calcium 9.2 8.5 - 10.5 mg/dL    Glucose 286 (H) 70 - 125 mg/dL    Alkaline Phosphatase 58 45 - 120 U/L    AST 29 0 - 40 U/L    ALT 72 (H) 0 - 45 U/L    Protein Total 7.2 6.0 - 8.0 g/dL     Albumin 3.7 3.5 - 5.0 g/dL    Bilirubin Total 1.3 (H) 0.0 - 1.0 mg/dL    GFR Estimate >90 >60 mL/min/1.73m2   CBC with platelets and differential     Status: None   Result Value Ref Range    WBC Count 7.5 4.0 - 11.0 10e3/uL    RBC Count 4.92 4.40 - 5.90 10e6/uL    Hemoglobin 15.4 13.3 - 17.7 g/dL    Hematocrit 44.1 40.0 - 53.0 %    MCV 90 78 - 100 fL    MCH 31.3 26.5 - 33.0 pg    MCHC 34.9 31.5 - 36.5 g/dL    RDW 11.2 10.0 - 15.0 %    Platelet Count 213 150 - 450 10e3/uL    % Neutrophils 65 %    % Lymphocytes 20 %    % Monocytes 5 %    % Eosinophils 9 %    % Basophils 1 %    % Immature Granulocytes 0 %    Absolute Neutrophils 4.8 1.6 - 8.3 10e3/uL    Absolute Lymphocytes 1.5 0.8 - 5.3 10e3/uL    Absolute Monocytes 0.4 0.0 - 1.3 10e3/uL    Absolute Eosinophils 0.7 0.0 - 0.7 10e3/uL    Absolute Basophils 0.1 0.0 - 0.2 10e3/uL    Absolute Immature Granulocytes 0.0 <=0.0 10e3/uL   Glucose, whole blood     Status: Abnormal   Result Value Ref Range    Glucose Whole Blood 287 (H) 79 - 116 mg/dL   CBC with platelets and differential     Status: None    Narrative    The following orders were created for panel order CBC with platelets and differential.  Procedure                               Abnormality         Status                     ---------                               -----------         ------                     CBC with platelets and d...[[id number]]                      Final result                 Please view results for these tests on the individual orders.

## 2021-08-05 ENCOUNTER — TELEPHONE (OUTPATIENT)
Dept: FAMILY MEDICINE | Facility: CLINIC | Age: 41
End: 2021-08-05

## 2021-08-05 DIAGNOSIS — R11.0 NAUSEA WITHOUT VOMITING: ICD-10-CM

## 2021-08-05 NOTE — TELEPHONE ENCOUNTER
Reason for Call:  Medication or medication refill:    Do you use a [facility]?  Name of the pharmacy and phone number for the current request:  [id number] - [place], [place] - [address] AT [facility] [address]    Name of the medication requested:   Pt was unable to tell me the name of medication but pt states it's for: nausea, depression, back pain and stomach  He went to walk-in yesterday and was prescribe medications, was told to pick-up 4 medications. He try picking-up but he's on a restricted MA program with Dr. Henriquez which it has to be approve/prescribe by Dr. Henriquez. Can CMT look at med list and let me know which medication he takes for the above symptoms that was prescribe recent?    Other request: n/a    Can we leave a detailed message on this number? NO    Phone number patient can be reached at: Cell number on file:    Telephone Information:   Mobile [phone]       Best Time: anytime    Call taken on 8/5/2021 at 12:50 PM by Aly Romano

## 2021-08-05 NOTE — TELEPHONE ENCOUNTER
----- Message from Quin Martino PA-C sent at 8/4/2021  8:40 PM CDT -----  Please call Team - please call patient with results.  Will need interpretor.    Please let know liver tests are improving.  All other labs reviewed at his visit    Try the anti-nausea medication sent  Follow up with primary care provider for ongoing mangement of diabetes.    I will place a diabetes educator referral

## 2021-08-05 NOTE — TELEPHONE ENCOUNTER
Using phone  writer notified patient of lab results per provider's notes. Patient voiced understanding and have not further question at this time.     Kayode NUÑEZ, KARINA

## 2021-08-06 RX ORDER — ONDANSETRON 4 MG/1
4 TABLET, ORALLY DISINTEGRATING ORAL EVERY 8 HOURS PRN
Qty: 10 TABLET | Refills: 0 | Status: SHIPPED | OUTPATIENT
Start: 2021-08-06 | End: 2021-10-04

## 2021-08-06 NOTE — TELEPHONE ENCOUNTER
Chart reviewed and patient uses Zofran. Will forward to MD for review and ordering of medication. Thank you!

## 2021-08-19 ENCOUNTER — PATIENT OUTREACH (OUTPATIENT)
Dept: CARE COORDINATION | Facility: CLINIC | Age: 41
End: 2021-08-19

## 2021-08-23 NOTE — PROGRESS NOTES
Clinic Care Coordination Contact  [facility]/Voicemail       Clinical Data: Care Coordinator Outreach  Outreach attempted x 3. CHW called and unable to reach and not able to leave a voice message for patient to return call.     Plan:  Care Coordinator will do no further outreaches at this time due to unreachable and [facility] RN will review chart and dis-enroll on 8/26/2021.

## 2021-08-26 ENCOUNTER — PATIENT OUTREACH (OUTPATIENT)
Dept: NURSING | Facility: CLINIC | Age: 41
End: 2021-08-26

## 2021-08-26 NOTE — PROGRESS NOTES
Clinic Care Coordination Contact    Follow Up Progress Note      Assessment: follow up   - CC RN tried calling patient x3 today at different time of the day but patient didn't not answer his phone. Patient was no show with PCP on 8/17/2021. CC RN hasn't been able to reach patient on 7/14/2021 and [id number]. Patient was no show in person for med teaching x2 with CC RN. Patient was initially enrolled with CC RN to assist coordinating her cares due to multiple hospitalizations the past 12 months. Patient was restricted to his PCP for med refills. Patient works full time and very hard to get a hold of.        Plan:   Due to multiple unreachable by [facility] and CC RN, and patient was not been engaging with his goal so will be disenroll from CCC as of today.

## 2021-08-27 DIAGNOSIS — K21.9 GASTROESOPHAGEAL REFLUX DISEASE WITHOUT ESOPHAGITIS: ICD-10-CM

## 2021-08-27 RX ORDER — FAMOTIDINE 20 MG/1
TABLET, FILM COATED ORAL
Qty: 180 TABLET | Refills: 0 | Status: SHIPPED | OUTPATIENT
Start: 2021-08-27 | End: 2021-11-16

## 2021-10-04 ENCOUNTER — HOSPITAL ENCOUNTER (EMERGENCY)
Facility: HOSPITAL | Age: 41
Discharge: HOME | End: 2021-10-04
Attending: EMERGENCY MEDICINE | Admitting: EMERGENCY MEDICINE
Payer: COMMERCIAL

## 2021-10-04 VITALS
HEART RATE: 79 BPM | RESPIRATION RATE: 18 BRPM | DIASTOLIC BLOOD PRESSURE: 83 MMHG | SYSTOLIC BLOOD PRESSURE: 111 MMHG | BODY MASS INDEX: 25.61 KG/M2 | WEIGHT: 140 LBS | OXYGEN SATURATION: 100 % | TEMPERATURE: 97.5 F

## 2021-10-04 DIAGNOSIS — E11.9 TYPE 2 DIABETES MELLITUS WITHOUT COMPLICATION, WITH LONG-TERM CURRENT USE OF INSULIN (H): ICD-10-CM

## 2021-10-04 DIAGNOSIS — R73.9 HYPERGLYCEMIA: ICD-10-CM

## 2021-10-04 DIAGNOSIS — Z79.4 TYPE 2 DIABETES MELLITUS WITHOUT COMPLICATION, WITH LONG-TERM CURRENT USE OF INSULIN (H): ICD-10-CM

## 2021-10-04 DIAGNOSIS — R11.0 NAUSEA WITHOUT VOMITING: ICD-10-CM

## 2021-10-04 DIAGNOSIS — F06.31 DEPRESSIVE DISORDER DUE TO ANOTHER MEDICAL CONDITION WITH DEPRESSIVE FEATURES: ICD-10-CM

## 2021-10-04 DIAGNOSIS — Z76.0 ENCOUNTER FOR MEDICATION REFILL: ICD-10-CM

## 2021-10-04 LAB
ANION GAP SERPL CALCULATED.3IONS-SCNC: 9 MMOL/L (ref 5–18)
BASE EXCESS BLDV CALC-SCNC: 6.4 MMOL/L
BUN SERPL-MCNC: 12 MG/DL (ref 8–22)
CALCIUM SERPL-MCNC: 9.8 MG/DL (ref 8.5–10.5)
CHLORIDE BLD-SCNC: 101 MMOL/L (ref 98–107)
CO2 SERPL-SCNC: 29 MMOL/L (ref 22–31)
CREAT SERPL-MCNC: 0.99 MG/DL (ref 0.7–1.3)
ERYTHROCYTE [DISTWIDTH] IN BLOOD BY AUTOMATED COUNT: 11.7 % (ref 10–15)
GFR SERPL CREATININE-BSD FRML MDRD: >90 ML/MIN/1.73M2
GLUCOSE BLD-MCNC: 209 MG/DL (ref 70–125)
GLUCOSE BLDC GLUCOMTR-MCNC: 465 MG/DL (ref 70–99)
HCO3 BLDV-SCNC: 26 MMOL/L (ref 24–30)
HCT VFR BLD AUTO: 48.8 % (ref 40–53)
HGB BLD-MCNC: 16.3 G/DL (ref 13.3–17.7)
KETONES BLD-SCNC: <0.1 MMOL/L
MCH RBC QN AUTO: 32 PG (ref 26.5–33)
MCHC RBC AUTO-ENTMCNC: 33.4 G/DL (ref 31.5–36.5)
MCV RBC AUTO: 96 FL (ref 78–100)
OXYHGB MFR BLDV: 22.7 % (ref 70–75)
PCO2 BLDV: 67 MM HG (ref 35–50)
PH BLDV: 7.3 [PH] (ref 7.35–7.45)
PLATELET # BLD AUTO: 277 10E3/UL (ref 150–450)
PO2 BLDV: 18 MM HG (ref 25–47)
POTASSIUM BLD-SCNC: 4.4 MMOL/L (ref 3.5–5)
RBC # BLD AUTO: 5.1 10E6/UL (ref 4.4–5.9)
SAO2 % BLDV: 23 % (ref 70–75)
SODIUM SERPL-SCNC: 139 MMOL/L (ref 136–145)
WBC # BLD AUTO: 8.5 10E3/UL (ref 4–11)

## 2021-10-04 PROCEDURE — 82805 BLOOD GASES W/O2 SATURATION: CPT | Performed by: EMERGENCY MEDICINE

## 2021-10-04 PROCEDURE — 258N000003 HC RX IP 258 OP 636: Performed by: EMERGENCY MEDICINE

## 2021-10-04 PROCEDURE — 80048 BASIC METABOLIC PNL TOTAL CA: CPT | Performed by: EMERGENCY MEDICINE

## 2021-10-04 PROCEDURE — 82010 KETONE BODYS QUAN: CPT | Performed by: EMERGENCY MEDICINE

## 2021-10-04 PROCEDURE — 85027 COMPLETE CBC AUTOMATED: CPT | Performed by: EMERGENCY MEDICINE

## 2021-10-04 PROCEDURE — 96360 HYDRATION IV INFUSION INIT: CPT

## 2021-10-04 PROCEDURE — 99284 EMERGENCY DEPT VISIT MOD MDM: CPT | Mod: 25

## 2021-10-04 PROCEDURE — 96361 HYDRATE IV INFUSION ADD-ON: CPT

## 2021-10-04 PROCEDURE — 36415 COLL VENOUS BLD VENIPUNCTURE: CPT | Performed by: EMERGENCY MEDICINE

## 2021-10-04 RX ORDER — ONDANSETRON 4 MG/1
4 TABLET, ORALLY DISINTEGRATING ORAL EVERY 8 HOURS PRN
Qty: 10 TABLET | Refills: 0 | Status: CANCELLED | OUTPATIENT
Start: 2021-10-04

## 2021-10-04 RX ORDER — ONDANSETRON 4 MG/1
4 TABLET, ORALLY DISINTEGRATING ORAL EVERY 8 HOURS PRN
Qty: 30 TABLET | Refills: 0 | Status: SHIPPED | OUTPATIENT
Start: 2021-10-04 | End: 2021-10-18

## 2021-10-04 RX ADMIN — SODIUM CHLORIDE 1000 ML: 9 INJECTION, SOLUTION INTRAVENOUS at 21:06

## 2021-10-04 ASSESSMENT — ENCOUNTER SYMPTOMS
FEVER: 0
BLOOD IN STOOL: 0
DIARRHEA: 1
ABDOMINAL PAIN: 0
SHORTNESS OF BREATH: 0
HEADACHES: 1
NAUSEA: 1

## 2021-10-04 NOTE — TELEPHONE ENCOUNTER
"Reason for Call:  Medication or medication refill:    Do you use a [facility]?  Name of the pharmacy and phone number for the current request:  [id number] - [place], [place] - [address] AT [facility] [address]    Name of the medication requested:   ondansetron (ZOFRAN-ODT) 4 MG ODT tab  metFORMIN (GLUCOPHAGE) 1000 MG tablet  insulin glargine (LANTUS SOLOSTAR U-100 INSULIN) 100 unit/mL (3 mL) pen  pen needle, diabetic (BD ULTRA-FINE MHASA PEN NEEDLE) 32 gauge x 5/32\" Ndle  sertraline (ZOLOFT) 50 MG tablet      Other request: Patients states his  is not helping him complete his Citizenship waiver. They only contact him once every other week. Patient wants to know what else Dr. Angel walls. Please advise.    Can we leave a detailed message on this number? YES    Phone number patient can be reached at: Cell number on file:    Telephone Information:   Mobile [phone]       Best Time: anytime    Call taken on 10/4/2021 at 1:01 PM by Ayl Romano          "

## 2021-10-04 NOTE — Clinical Note
Pablo Russo was seen and treated in our emergency department on 10/4/2021.  He may return to work on 10/05/2021.  Pablo Russo was evaluated in the emergency department and received care.  He can return to work without restrictions on 10-5-21.     If you have any questions or concerns, please don't hesitate to call.      Jacquie Sosa MD

## 2021-10-04 NOTE — ED TRIAGE NOTES
C/o high blood sugars causing headache. Does not check sugars at home. Feels is high d/t not taking meds today.

## 2021-10-05 ENCOUNTER — PATIENT OUTREACH (OUTPATIENT)
Dept: CARE COORDINATION | Facility: CLINIC | Age: 41
End: 2021-10-05

## 2021-10-05 DIAGNOSIS — Z71.89 OTHER SPECIFIED COUNSELING: ICD-10-CM

## 2021-10-05 NOTE — CONFIDENTIAL NOTE
Re: citizenship process and medications...   Patient needs to be seen in clinic to discuss in person because he has not answered several attempts by [facility] to reach him. They have left voicemails for return calls. His ARM worker should be helping him with his legal issues regarding citizenship. We cannot help him until that is resolved.    We need to review his medications because he does have several refills that he never picked up and his diabetes has not been reviewed in a while.     Please reschedule patient again and remind him to bring in all his medications.     Peggy Ortiz MD    =============================================    Mediations reviewed.   Patient was at the ER last night, saying he was unable to get his medications because no one would prescribe them.     Patient has several prescriptions waiting at the pharmacy.   I wanted to address this with him at his appointment today in clinic at 12:20PM. It is currently 12:32PM and he has not arrived.     Re: citizenship waiver, social work in our clinic has attempted to assist him. Patient has significant legal trouble, per [facility] he needs assistance through legal services. Even if he does get a citizenship waiver, he would not be able to complete the process due to legal issues. Per notes, he is to work with his [employer] worker regarding this.      Multiple no shows in clinic and high ER utilization resulted in patient being restricted. Patient was in the ER yesterday saying he was unable to get medications. All medications have been refilled within the past 2-3 months but a few have not been picked up including his insulin that was prescribed in July 2021.       Bottom line, patient needs to be seen and he needs to bring in his medications.

## 2021-10-05 NOTE — ED PROVIDER NOTES
EMERGENCY DEPARTMENT ENCOUNTER      NAME: Pablo Russo  AGE: 40 year old male  YOB: 1980  MRN: [id number]  EVALUATION DATE & TIME: No admission date for patient encounter.    PCP: Peggy Ortiz    ED PROVIDER: Jacquie Sosa M.D.      Chief Complaint   Patient presents with     Headache     Hyperglycemia         FINAL IMPRESSION:  1. Hyperglycemia    2. Encounter for medication refill    3. Nausea without vomiting    4. Type 2 diabetes mellitus without complication, with long-term current use of insulin (H)        MEDICAL DECISION MAKING:    10:35 PM  I met with the patient, obtained history, performed an initial exam, and discussed options and plan for diagnostics and treatment here in the ED. PPE worn: n95 mask, vloth mask, face shield, eye protection.     Pertinent Labs & Imaging studies reviewed. (See chart for details)     Pablo Russo is a 40 year old male who presents with concerns about medication refill, namely his Metformin and hyperglycemia because he has been out of this medication.  He has had intermittent nausea but no vomiting.  Vital signs are within normal limits here.  His point-of-care glucose on arrival however is in the mid 400s.  He has not been checking his sugars at home.  He does not look toxic in appearance.  His exam is unremarkable.  He has no focal deficits or clinical signs of DKA but I will check DKA labs, give him an IV fluid bolus for the hyperglycemia and antiemetics as required.    His labs do not show any evidence of severe dehydration, acute kidney injury or DKA.  His ketones were less than 0.1.  Repeat glucose after fluids was 209.  His blood gas appears to be erroneous because it does not match with any of his other laboratory evaluations tonight.  Because his ketone is undetectable and glucose has come down appropriately, I will not repeat this lab test.    He is feeling significantly improved after the fluids.  He is requesting a refill of his Metformin and Zofran  "which I can do for him.  We discussed warning signs and indications to return to the emergency department.  He understands these warning signs and will return with any concerns.     MEDICATIONS GIVEN IN THE EMERGENCY:  Medications   0.9% sodium chloride BOLUS (has no administration in time range)       NEW PRESCRIPTIONS STARTED AT TODAY'S ER VISIT:  Refilled Metformin  Refilled Zofran      =================================================================    HPI    Patient information was obtained from: patient     Use of :  Yes (Phone) - Language Radha Russo is a 40 year old male with a history of DM2 who presents headache and hyperglycemia.    Patient reports he has ran out of his medications causing his blood sugar levels to increase. He has not been feeling well and endorses nausea and diarrhea at least once or twice \"every week\".  At present, his symptoms has resolved and he feels much better.     He denies abdominal pain, chest pain, shortness of breath, blood in stool, fever or any other associated symptoms at this time.       REVIEW OF SYSTEMS   Review of Systems   Constitutional: Negative for fever.   Respiratory: Negative for shortness of breath.    Gastrointestinal: Positive for diarrhea and nausea. Negative for abdominal pain and blood in stool.   Neurological: Positive for headaches.   All other systems reviewed and are negative.       PAST MEDICAL HISTORY:  Past Medical History:   Diagnosis Date     Immune to hepatitis B 2/12/2020 2/11/2020 - Immune from prior infection     Immune to hepatitis B 2/12/2020 2/11/2020 - Immune from prior infection       PAST SURGICAL HISTORY:  No past surgical history on file.    CURRENT MEDICATIONS:      Current Facility-Administered Medications:      0.9% sodium chloride BOLUS, 1,000 mL, Intravenous, Once, Evi An MD    Current Outpatient Medications:      ACCU-CHEK FASTCLIX LANCET DRUM, [ACCU-CHEK FASTCLIX LANCET DRUM] USE 1 EACH TO " "TEST DAILY., Disp: 100 each, Rfl: 3     ACCU-CHEK GUIDE TEST STRIPS strips, [ACCU-CHEK GUIDE TEST STRIPS STRIPS] Use 1 each As Directed daily. Dispense brand per patient's insurance at pharmacy discretion., Disp: 100 strip, Rfl: 3     atorvastatin (LIPITOR) 20 MG tablet, [ATORVASTATIN (LIPITOR) 20 MG TABLET] Take 1 tablet (20 mg total) by mouth daily., Disp: 90 tablet, Rfl: 3     blood-glucose meter Misc, [BLOOD-GLUCOSE METER MISC] Use 1 application As Directed daily for 1 day., Disp: 1 each, Rfl: 0     famotidine (PEPCID) 20 MG tablet, TAKE 1 TABLET(20 MG) BY MOUTH TWICE DAILY, Disp: 180 tablet, Rfl: 0     fluticasone propionate (FLONASE) 50 mcg/actuation nasal spray, [FLUTICASONE PROPIONATE (FLONASE) 50 MCG/ACTUATION NASAL SPRAY] 2 sprays into each nostril daily., Disp: 18 g, Rfl: 11     generic lancets, [GENERIC LANCETS] Use 1 each As Directed daily. Dispense microlet lancets to use with his new Contour Next meter please - recently received accu-chek lancets which do not work with new meter, Disp: 100 each, Rfl: 6     insulin glargine (LANTUS SOLOSTAR U-100 INSULIN) 100 unit/mL (3 mL) pen, [INSULIN GLARGINE (LANTUS SOLOSTAR U-100 INSULIN) 100 UNIT/ML (3 ML) PEN] Inject 20 Units under the skin at bedtime for 15 days., Disp: 3 mL, Rfl: 0     meloxicam (MOBIC) 15 MG tablet, [MELOXICAM (MOBIC) 15 MG TABLET] Take 1 tablet (15 mg total) by mouth daily., Disp: 90 tablet, Rfl: 3     metFORMIN (GLUCOPHAGE) 1000 MG tablet, [METFORMIN (GLUCOPHAGE) 1000 MG TABLET] Take 1 tablet (1,000 mg total) by mouth 2 (two) times a day., Disp: 180 tablet, Rfl: 3     olopatadine (PATANOL) 0.1 % ophthalmic solution, [OLOPATADINE (PATANOL) 0.1 % OPHTHALMIC SOLUTION] Administer 1 drop to both eyes 2 (two) times a day., Disp: 5 mL, Rfl: 3     ondansetron (ZOFRAN-ODT) 4 MG ODT tab, Take 1 tablet (4 mg) by mouth every 8 hours as needed, Disp: 10 tablet, Rfl: 0     pen needle, diabetic (BD ULTRA-FINE MAHSA PEN NEEDLE) 32 gauge x 5/32\" Ndle, [PEN " "NEEDLE, DIABETIC (BD ULTRA-FINE MAHSA PEN NEEDLE) 32 GAUGE X 5/32\" NDLE] Inject 1 each under the skin daily., Disp: 100 each, Rfl: 3     prochlorperazine (COMPAZINE) 10 MG tablet, Take 1 tablet (10 mg) by mouth every 6 hours as needed for nausea or vomiting, Disp: 30 tablet, Rfl: 0     prochlorperazine (COMPAZINE) 10 MG tablet, Take 1 tablet (10 mg) by mouth every 6 hours as needed for nausea or vomiting, Disp: 20 tablet, Rfl: 0     QUEtiapine (SEROQUEL) 50 MG tablet, [QUETIAPINE (SEROQUEL) 50 MG TABLET] Take 1 tablet (50 mg total) by mouth at bedtime., Disp: 90 tablet, Rfl: 3     sertraline (ZOLOFT) 50 MG tablet, [SERTRALINE (ZOLOFT) 50 MG TABLET] Take 1 tablet (50 mg total) by mouth daily., Disp: 90 tablet, Rfl: 3     sildenafil (REVATIO) 20 mg tablet, [SILDENAFIL (REVATIO) 20 MG TABLET] Take 20 mg by mouth., Disp: , Rfl:     ALLERGIES:  No Known Allergies    FAMILY HISTORY:  No family history on file.    SOCIAL HISTORY:   Social History     Tobacco Use     Smoking status: Former Smoker     Smokeless tobacco: Never Used   Substance Use Topics     Alcohol use: Yes     Drug use: Not Currently        PHYSICAL EXAM:    Vitals: /73   Pulse 90   Temp 97.5  F (36.4  C) (Tympanic)   Resp 20   Wt 63.5 kg (140 lb)   SpO2 97%   BMI 25.61 kg/m     General:. Alert and interactive, comfortable appearing.  HENT: Oropharynx without erythema or exudates. MMM.  TMs clear bilaterally.  Eyes: Pupils mid-sized and equally reactive.   Neck: Full AROM.  No midline tenderness to palpation.  Cardiovascular: Regular rate and rhythm. Peripheral pulses 2+ bilaterally.  Chest/Pulmonary: Normal work of breathing. Lung sounds clear and equal throughout, no wheezes or crackles. No chest wall tenderness or deformities.  Abdomen: Soft, nondistended. Nontender without guarding or rebound.  Back/Spine: No CVA or midline tenderness.  Extremities: Normal ROM of all major joints. No lower extremity edema.   Skin: Warm and dry. Normal skin " color.   Neuro: Speech clear. CNs grossly intact. Moves all extremities appropriately. Strength and sensation grossly intact to all extremities.   Psych: Normal affect/mood, cooperative, memory appropriate.     LAB:  All pertinent labs reviewed and interpreted.  Labs Ordered and Resulted from Time of ED Arrival Up to the Time of Departure from the ED   GLUCOSE BY METER - Abnormal; Notable for the following components:       Result Value    GLUCOSE BY METER POCT 465 (*)     All other components within normal limits   BASIC METABOLIC PANEL - Abnormal; Notable for the following components:    Glucose 209 (*)     All other components within normal limits   BLOOD GAS VENOUS - Abnormal; Notable for the following components:    pH Venous 7.30 (*)     pCO2 Venous 67 (*)     pO2 Venous 18 (*)     Oxyhemoglobin Venous 22.7 (*)     O2 Sat, Venous 23.0 (*)     All other components within normal limits   CBC WITH PLATELETS - Normal   KETONE BETA-HYDROXYBUTYRATE QUANTITATIVE, RAPID - Normal   GLUCOSE MONITOR NURSING POCT   PERIPHERAL IV CATHETER       I, Chanel Obregon, am serving as a scribe to document services personally performed by Dr. Jacquie Sosa  based on my observation and the provider's statements to me. I, Jacquie Sosa MD attest that Chanel Obregon is acting in a scribe capacity, has observed my performance of the services and has documented them in accordance with my direction.      Jacquie Sosa M.D.  Emergency Medicine  [facility] EMERGENCY DEPARTMENT  [facility] [facility] [address]  [phone]  Dept: [phone]         Jacquie Sosa MD  10/05/21 0132

## 2021-10-05 NOTE — PROGRESS NOTES
Clinic Care Coordination Contact  [facility]/Voicemail       Clinical Data: Care Coordinator Outreach  Outreach attempted x 1.  Left message on patient's voicemail with call back information and requested return call.  Plan: Care Coordinator will try to reach patient again in 1-2 business days.    SABAS Woodard  [phone]  [facility]

## 2021-10-05 NOTE — DISCHARGE INSTRUCTIONS
You may take the prescriptions to your local pharmacy to be filled.  Please follow-up with your primary care doctor for your routine adult care.    Develop vomiting such that you are unable to tolerate oral hydration, very high blood sugars despite taking your diabetes medications or any other new/concerning symptoms please return to the emergency department.

## 2021-10-06 NOTE — PROGRESS NOTES
Clinic Care Coordination Contact  [facility]/Voicemail       Clinical Data: Care Coordinator Outreach  Outreach attempted x 2.  Left message on patient's voicemail with call back information and requested return call.  Plan: Care Coordinator will do no further outreaches at this time.    SABAS Woodard  [phone]  [facility]

## 2021-10-18 ENCOUNTER — TELEPHONE (OUTPATIENT)
Dept: FAMILY MEDICINE | Facility: CLINIC | Age: 41
End: 2021-10-18

## 2021-10-18 ENCOUNTER — OFFICE VISIT (OUTPATIENT)
Dept: FAMILY MEDICINE | Facility: CLINIC | Age: 41
End: 2021-10-18
Payer: COMMERCIAL

## 2021-10-18 VITALS
HEART RATE: 89 BPM | DIASTOLIC BLOOD PRESSURE: 79 MMHG | BODY MASS INDEX: 24.29 KG/M2 | OXYGEN SATURATION: 100 % | HEIGHT: 62 IN | SYSTOLIC BLOOD PRESSURE: 114 MMHG | WEIGHT: 132 LBS

## 2021-10-18 DIAGNOSIS — Z23 NEED FOR DIPHTHERIA-TETANUS-PERTUSSIS (TDAP) VACCINE: ICD-10-CM

## 2021-10-18 DIAGNOSIS — Z79.4 TYPE 2 DIABETES MELLITUS WITHOUT COMPLICATION, WITH LONG-TERM CURRENT USE OF INSULIN (H): Primary | ICD-10-CM

## 2021-10-18 DIAGNOSIS — Z13.220 SCREENING FOR HYPERLIPIDEMIA: ICD-10-CM

## 2021-10-18 DIAGNOSIS — Z23 NEED FOR PROPHYLACTIC VACCINATION AND INOCULATION AGAINST INFLUENZA: ICD-10-CM

## 2021-10-18 DIAGNOSIS — Z79.4 TYPE 2 DIABETES MELLITUS WITHOUT COMPLICATION, WITH LONG-TERM CURRENT USE OF INSULIN (H): ICD-10-CM

## 2021-10-18 DIAGNOSIS — E11.9 TYPE 2 DIABETES MELLITUS WITHOUT COMPLICATION, WITH LONG-TERM CURRENT USE OF INSULIN (H): ICD-10-CM

## 2021-10-18 DIAGNOSIS — B86 SCABIES: ICD-10-CM

## 2021-10-18 DIAGNOSIS — R11.0 NAUSEA WITHOUT VOMITING: ICD-10-CM

## 2021-10-18 DIAGNOSIS — E11.9 TYPE 2 DIABETES MELLITUS WITHOUT COMPLICATION, WITH LONG-TERM CURRENT USE OF INSULIN (H): Primary | ICD-10-CM

## 2021-10-18 DIAGNOSIS — Z76.0 ENCOUNTER FOR MEDICATION REFILL: Primary | ICD-10-CM

## 2021-10-18 DIAGNOSIS — Z23 NEED FOR COVID-19 VACCINE: ICD-10-CM

## 2021-10-18 LAB — HBA1C MFR BLD: 7.8 % (ref 0–5.6)

## 2021-10-18 PROCEDURE — 80061 LIPID PANEL: CPT | Performed by: FAMILY MEDICINE

## 2021-10-18 PROCEDURE — 90682 RIV4 VACC RECOMBINANT DNA IM: CPT | Performed by: FAMILY MEDICINE

## 2021-10-18 PROCEDURE — 99213 OFFICE O/P EST LOW 20 MIN: CPT | Mod: 25 | Performed by: FAMILY MEDICINE

## 2021-10-18 PROCEDURE — 82043 UR ALBUMIN QUANTITATIVE: CPT | Performed by: FAMILY MEDICINE

## 2021-10-18 PROCEDURE — 90471 IMMUNIZATION ADMIN: CPT | Performed by: FAMILY MEDICINE

## 2021-10-18 PROCEDURE — 83036 HEMOGLOBIN GLYCOSYLATED A1C: CPT | Performed by: FAMILY MEDICINE

## 2021-10-18 PROCEDURE — 0001A COVID-19,PF,PFIZER (12+ YRS): CPT | Performed by: FAMILY MEDICINE

## 2021-10-18 PROCEDURE — 90472 IMMUNIZATION ADMIN EACH ADD: CPT | Performed by: FAMILY MEDICINE

## 2021-10-18 PROCEDURE — 36415 COLL VENOUS BLD VENIPUNCTURE: CPT | Performed by: FAMILY MEDICINE

## 2021-10-18 PROCEDURE — 90715 TDAP VACCINE 7 YRS/> IM: CPT | Performed by: FAMILY MEDICINE

## 2021-10-18 PROCEDURE — 91300 COVID-19,PF,PFIZER (12+ YRS): CPT | Performed by: FAMILY MEDICINE

## 2021-10-18 RX ORDER — PROCHLORPERAZINE MALEATE 10 MG
10 TABLET ORAL EVERY 6 HOURS PRN
Qty: 30 TABLET | Refills: 0 | Status: SHIPPED | OUTPATIENT
Start: 2021-10-18 | End: 2021-10-19

## 2021-10-18 RX ORDER — ONDANSETRON 4 MG/1
4 TABLET, ORALLY DISINTEGRATING ORAL EVERY 8 HOURS PRN
Qty: 30 TABLET | Refills: 0 | Status: SHIPPED | OUTPATIENT
Start: 2021-10-18 | End: 2021-10-19

## 2021-10-18 ASSESSMENT — PAIN SCALES - GENERAL: PAINLEVEL: NO PAIN (0)

## 2021-10-18 ASSESSMENT — MIFFLIN-ST. JEOR: SCORE: 1388

## 2021-10-18 NOTE — PROGRESS NOTES
"    Assessment & Plan     Nausea without vomiting  Continue compazine and zofran prn    Type 2 diabetes mellitus without complication, with long-term current use of insulin (H)  Check labs and medication adjustment as indicated.  - Lipid panel reflex to direct LDL Fasting; Future  - HEMOGLOBIN A1C; Future  - Albumin Random Urine Quantitative with Creat Ratio; Future  - Lipid panel reflex to direct LDL Fasting  - HEMOGLOBIN A1C  - Albumin Random Urine Quantitative with Creat Ratio    Need for prophylactic vaccination and inoculation against influenza    - MD RIV4 (FLUBLOK) VACCINE RECOMBINANT DNA PRSRV ANTIBIO FREE, IM (6505142)    Need for diphtheria-tetanus-pertussis (Tdap) vaccine    - TDAP VACCINE (Adacel, Boostrix)  [5496597]    Screening for hyperlipidemia      Need for COVID-19 vaccine    - COVID-19,PF,PFIZER (12+ YRS)       The uses and side effects, including black box warnings as appropriate, were discussed in detail.  All patient questions were answered.  The patient was instructed to call immediately if any side effects developed.     Return in about 3 months (around 1/18/2022).    Miriam Johansen MD  [facility]   Hsa is a 40 year old who presents for the following health issues     HPI     Nausea and vomiting for week - stable with current treatment.  DM2 - taking medications without issues.    Review of Systems   Constitutional, HEENT, cardiovascular, pulmonary, gi and gu systems are negative, except as otherwise noted.      Objective    /79 (BP Location: Left arm, Patient Position: Chair, Cuff Size: Adult Regular)   Pulse 89   Ht 1.575 m (5' 2\")   Wt 59.9 kg (132 lb)   SpO2 100%   BMI 24.14 kg/m    Body mass index is 24.14 kg/m .  Physical Exam   GENERAL: healthy, alert and no distress  NECK: no adenopathy, no asymmetry, masses, or scars and thyroid normal to palpation  RESP: lungs clear to auscultation - no rales, rhonchi or wheezes  CV: " regular rate and rhythm, normal S1 S2, no S3 or S4, no murmur, click or rub, no peripheral edema and peripheral pulses strong  ABDOMEN: soft, nontender, no hepatosplenomegaly, no masses and bowel sounds normal  MS: no gross musculoskeletal defects noted, no edema  PSYCH: mentation appears normal, affect normal/bright

## 2021-10-18 NOTE — NURSING NOTE
VACCINE QUESTIONS DONE WITH  ID# [id number]    Prior to immunization administration, verified patients identity using patient s name and date of birth. Please see Immunization Activity for additional information.     Screening Questionnaire for Adult Immunization    Are you sick today?   No   Do you have allergies to medications, food, a vaccine component or latex?   No   Have you ever had a serious reaction after receiving a vaccination?   No   Do you have a long-term health problem with heart, lung, kidney, or metabolic disease (e.g., diabetes), asthma, a blood disorder, no spleen, complement component deficiency, a cochlear implant, or a spinal fluid leak?  Are you on long-term aspirin therapy?   Yes   Do you have cancer, leukemia, HIV/AIDS, or any other immune system problem?   No   Do you have a parent, brother, or sister with an immune system problem?   No   In the past 3 months, have you taken medications that affect  your immune system, such as prednisone, other steroids, or anticancer drugs; drugs for the treatment of rheumatoid arthritis, Crohn s disease, or psoriasis; or have you had radiation treatments?   No   Have you had a seizure, or a brain or other nervous system problem?   No   During the past year, have you received a transfusion of blood or blood    products, or been given immune (gamma) globulin or antiviral drug?   No   For women: Are you pregnant or is there a chance you could become       pregnant during the next month?   No   Have you received any vaccinations in the past 4 weeks?   No     Immunization questionnaire was positive for at least one answer.  Notified PROVIDER AWARE.        Patient instructed to remain in clinic for 15 minutes afterwards, and to report any adverse reaction to me immediately.       Screening performed by Josué Velásquez MA on 10/18/2021 at 6:26 PM.

## 2021-10-18 NOTE — LETTER
October 21, 2021      Pablo Russo  [address] [place] [address]        Mr. Russo,     Your diabetes looks okay, your cholesterol is good and your urine test was okay.     Please contact the clinic if you have additional questions.  Thank you.     Sincerely,     Miriam Johansen MD      Resulted Orders   Lipid panel reflex to direct LDL Fasting   Result Value Ref Range    Cholesterol 185 <200 mg/dL    Triglycerides 105 <150 mg/dL    Direct Measure HDL 54 >=40 mg/dL    LDL Cholesterol Calculated 110 (H) <=100 mg/dL    Non HDL Cholesterol 131 (H) <130 mg/dL    Patient Fasting > 8hrs? No     Narrative    Cholesterol  Desirable:  <200 mg/dL    Triglycerides  Normal:  Less than 150 mg/dL  Borderline High:  150-199 mg/dL  High:  200-499 mg/dL  Very High:  Greater than or equal to 500 mg/dL    Direct Measure HDL  Female:  Greater than or equal to 50 mg/dL   Male:  Greater than or equal to 40 mg/dL    LDL Cholesterol  Desirable:  <100mg/dL  Above Desirable:  100-129 mg/dL   Borderline High:  130-159 mg/dL   High:  160-189 mg/dL   Very High:  >= 190 mg/dL    Non HDL Cholesterol  Desirable:  130 mg/dL  Above Desirable:  130-159 mg/dL  Borderline High:  160-189 mg/dL  High:  190-219 mg/dL  Very High:  Greater than or equal to 220 mg/dL   HEMOGLOBIN A1C   Result Value Ref Range    Hemoglobin A1C 7.8 (H) 0.0 - 5.6 %      Comment:      Normal <5.7%   Prediabetes 5.7-6.4%    Diabetes 6.5% or higher     Note: Adopted from ADA consensus guidelines.   Albumin Random Urine Quantitative with Creat Ratio   Result Value Ref Range    Creatinine Urine mg/dL 371 mg/dL    Albumin Urine mg/L 73 mg/L    Albumin Urine mg/g Cr 19.68 (H) 0.00 - 17.00 mg/g Cr

## 2021-10-18 NOTE — PATIENT INSTRUCTIONS
At [facility], we strive to deliver an exceptional experience to you, every time we see you. If you receive a survey, please complete it as we do value your feedback.  If you have MyChart, you can expect to receive results automatically within 24 hours of their completion.  Your provider will send a note interpreting your results as well.   If you do not have MyChart, you should receive your results in about a week by mail.    Your care team:                            Family Medicine Internal Medicine   MD Oneil Cota MD Shantel Branch-Fleming, MD Srinivasa Vaka, MD Katya Belousova, PAJEFFREY Kraft, APRN CNP    Herminio Ortiz, MD Pediatrics   Eliel Riddle, PAJEFFREY Andersen, CNP MD Michaela Pennington APRN CNP   MD Leela Metz MD Deborah Mielke, MD Nakia David, APRN [facility]      Clinic hours: Monday - Thursday 7 am-6 pm; Fridays 7 am-5 pm.   Urgent care: Monday - Friday 10 am- 8 pm; Saturday and Sunday 9 am-5 pm.    Clinic: [phone]       [facility]: Monday - Thursday 8 am - 7 pm; Friday 8 am - 6 pm  [facility]: [phone]     Use www.[email or web address].org for 24/7 diagnosis and treatment of dozens of conditions.

## 2021-10-18 NOTE — LETTER
October 18, 2021      Pablo Russo  [address] [place] [address]        To Whom It May Concern:    Pablo Russo  was seen on 10/18/21.  Please excuse him  until 10/19/21 due to appointment..        Sincerely,        Miriam Johansen MD

## 2021-10-19 ENCOUNTER — NURSE TRIAGE (OUTPATIENT)
Dept: NURSING | Facility: CLINIC | Age: 41
End: 2021-10-19

## 2021-10-19 DIAGNOSIS — R11.0 NAUSEA WITHOUT VOMITING: ICD-10-CM

## 2021-10-19 LAB
CHOLEST SERPL-MCNC: 185 MG/DL
CREAT UR-MCNC: 371 MG/DL
FASTING STATUS PATIENT QL REPORTED: NO
HDLC SERPL-MCNC: 54 MG/DL
LDLC SERPL CALC-MCNC: 110 MG/DL
MICROALBUMIN UR-MCNC: 73 MG/L
MICROALBUMIN/CREAT UR: 19.68 MG/G CR (ref 0–17)
NONHDLC SERPL-MCNC: 131 MG/DL
TRIGL SERPL-MCNC: 105 MG/DL

## 2021-10-19 RX ORDER — ONDANSETRON 4 MG/1
4 TABLET, ORALLY DISINTEGRATING ORAL EVERY 8 HOURS PRN
Qty: 30 TABLET | Refills: 0 | Status: SHIPPED | OUTPATIENT
Start: 2021-10-19 | End: 2021-11-16

## 2021-10-19 RX ORDER — PROCHLORPERAZINE MALEATE 10 MG
10 TABLET ORAL EVERY 6 HOURS PRN
Qty: 30 TABLET | Refills: 0 | Status: SHIPPED | OUTPATIENT
Start: 2021-10-19 | End: 2021-11-16

## 2021-10-19 NOTE — TELEPHONE ENCOUNTER
[facility] ([facility]) pharmacy calling about 3 prescriptions that were not sent by dr Ortiz. Patient is restricted to Dr Ortiz    Requesting:    Ondansetron 4mg 30 tabs   Compazine 10mg 30tabs  Metformin 100mg 180 tabs    RN spoke directly with provider who gave verbal approval for ondansetron and metformin NOT compazine      RN provided verbal approval/denial directly to pharmacist Sandeep Barr, RN on 10/19/2021 at 4:37 PM

## 2021-10-19 NOTE — TELEPHONE ENCOUNTER
Message from [facility]:    Patient is restricted to [facility].  Please send these scripts to [facility]'s preferred [facility].    [facility] [address] [facility]      ondansetron (ZOFRAN-ODT) 4 MG ODT tab 30 tablet 0 10/18/2021     prochlorperazine (COMPAZINE) 10 MG tablet 30 tablet 0 10/18/2021

## 2021-10-19 NOTE — TELEPHONE ENCOUNTER
Pharmacist at [facility] is calling do to issues with filling the prescriptions that's he received today.  Pharmacy is saying that he can only fill prescriptions from one Dr.  Call to the clinic to speak with someone from the care team.  Transferred to [facility] for further help.    Gabrielle Garcia RN/ [facility]      Reason for Disposition    Pharmacy calling with prescription questions and triager unable to answer question     Transferred call to Care team    Protocols used: MEDICATION QUESTION CALL-A-OH

## 2021-10-21 NOTE — RESULT ENCOUNTER NOTE
Mr. Russo,    Your diabetes looks okay, your cholesterol is good and your urine test was okay.    Please contact the clinic if you have additional questions.  Thank you.    Sincerely,    Miriam Johansen MD

## 2021-10-26 ENCOUNTER — TELEPHONE (OUTPATIENT)
Dept: FAMILY MEDICINE | Facility: CLINIC | Age: 41
End: 2021-10-26

## 2021-10-26 DIAGNOSIS — Z76.0 ENCOUNTER FOR MEDICATION REFILL: ICD-10-CM

## 2021-10-26 DIAGNOSIS — K21.9 GASTROESOPHAGEAL REFLUX DISEASE WITHOUT ESOPHAGITIS: ICD-10-CM

## 2021-10-26 DIAGNOSIS — E11.9 TYPE 2 DIABETES MELLITUS WITHOUT COMPLICATION, WITH LONG-TERM CURRENT USE OF INSULIN (H): ICD-10-CM

## 2021-10-26 DIAGNOSIS — F51.01 PRIMARY INSOMNIA: ICD-10-CM

## 2021-10-26 DIAGNOSIS — R11.0 NAUSEA WITHOUT VOMITING: ICD-10-CM

## 2021-10-26 DIAGNOSIS — Z79.4 TYPE 2 DIABETES MELLITUS WITHOUT COMPLICATION, WITH LONG-TERM CURRENT USE OF INSULIN (H): ICD-10-CM

## 2021-10-26 DIAGNOSIS — F06.31 DEPRESSIVE DISORDER DUE TO ANOTHER MEDICAL CONDITION WITH DEPRESSIVE FEATURES: ICD-10-CM

## 2021-10-26 DIAGNOSIS — E11.00 TYPE 2 DIABETES MELLITUS WITH HYPEROSMOLARITY WITHOUT COMA, WITHOUT LONG-TERM CURRENT USE OF INSULIN (H): ICD-10-CM

## 2021-10-26 DIAGNOSIS — H10.13 ALLERGIC CONJUNCTIVITIS, BILATERAL: ICD-10-CM

## 2021-10-26 DIAGNOSIS — M54.50 CHRONIC LOW BACK PAIN, UNSPECIFIED BACK PAIN LATERALITY, UNSPECIFIED WHETHER SCIATICA PRESENT: ICD-10-CM

## 2021-10-26 DIAGNOSIS — G89.29 CHRONIC LOW BACK PAIN, UNSPECIFIED BACK PAIN LATERALITY, UNSPECIFIED WHETHER SCIATICA PRESENT: ICD-10-CM

## 2021-10-26 NOTE — TELEPHONE ENCOUNTER
Reason for Call:  Other     Detailed comments: patient is calling today with cristiana saxena to say that [facility] not [facility] meds. It looks aneesh the refill was called in 10/19. Can we clarify please?    Phone Number Patient can be reached at: Home number on file [phone] (home)    Best Time: any    Can we leave a detailed message on this number? YES    Call taken on 10/26/2021 at 11:21 AM by Moni Hernandez

## 2021-10-26 NOTE — TELEPHONE ENCOUNTER
Patient's preferred pharmacy is [facility]. There were two pharmacies in chart so CMT updated to reflect this. Patient also states that [facility] does not accept his insurance - advised patient that if that is the case he will need to choose a different pharmacy.     The patient is not aware of which pharmacy will accept his insurance. CMT advised the patient that he must call his insurance company and see which pharmacies are preferred.     After the discussion above the patient states that [facility] does accept his insurance. ELISA advised the patient that he was confused. CMT offered to update pharmacy to [facility] if that is what he wanted. The patient was very hard to understand and this author was unable to deduce what the patient needed.      ELISA finally advised the patient to contact insurance to verify which pharmacy is preferred and to call us back to brad us know.

## 2021-11-03 ENCOUNTER — NURSE TRIAGE (OUTPATIENT)
Dept: FAMILY MEDICINE | Facility: CLINIC | Age: 41
End: 2021-11-03

## 2021-11-03 ENCOUNTER — OFFICE VISIT (OUTPATIENT)
Dept: FAMILY MEDICINE | Facility: CLINIC | Age: 41
End: 2021-11-03
Payer: COMMERCIAL

## 2021-11-03 ENCOUNTER — TELEPHONE (OUTPATIENT)
Dept: FAMILY MEDICINE | Facility: CLINIC | Age: 41
End: 2021-11-03

## 2021-11-03 VITALS
RESPIRATION RATE: 18 BRPM | WEIGHT: 137.3 LBS | TEMPERATURE: 97.9 F | SYSTOLIC BLOOD PRESSURE: 133 MMHG | OXYGEN SATURATION: 100 % | BODY MASS INDEX: 25.11 KG/M2 | DIASTOLIC BLOOD PRESSURE: 83 MMHG | HEART RATE: 60 BPM

## 2021-11-03 DIAGNOSIS — B86 SCABIES: Primary | ICD-10-CM

## 2021-11-03 PROCEDURE — 99213 OFFICE O/P EST LOW 20 MIN: CPT | Performed by: PHYSICIAN ASSISTANT

## 2021-11-03 RX ORDER — ACETAMINOPHEN 325 MG/1
650 TABLET ORAL
COMMUNITY
Start: 2021-01-18 | End: 2021-11-16

## 2021-11-03 RX ORDER — NAPROXEN 500 MG/1
TABLET ORAL
COMMUNITY
Start: 2021-03-22 | End: 2021-11-16

## 2021-11-03 RX ORDER — POLYETHYLENE GLYCOL 3350 17 G/17G
17 POWDER, FOR SOLUTION ORAL
COMMUNITY
Start: 2020-09-27 | End: 2021-11-16

## 2021-11-03 RX ORDER — CYCLOBENZAPRINE HCL 10 MG
10 TABLET ORAL
COMMUNITY
Start: 2020-11-03 | End: 2021-11-16

## 2021-11-03 RX ORDER — IBUPROFEN 600 MG/1
600 TABLET, FILM COATED ORAL
COMMUNITY
Start: 2020-08-11 | End: 2021-11-16

## 2021-11-03 RX ORDER — ONDANSETRON 4 MG/1
4 TABLET, FILM COATED ORAL
COMMUNITY
Start: 2021-01-18 | End: 2021-11-16

## 2021-11-03 RX ORDER — BENZONATATE 100 MG/1
100 CAPSULE ORAL
COMMUNITY
Start: 2020-04-15 | End: 2021-11-16

## 2021-11-03 RX ORDER — TRIAMCINOLONE ACETONIDE 1 MG/G
CREAM TOPICAL 2 TIMES DAILY
Qty: 60 G | Refills: 1 | Status: SHIPPED | OUTPATIENT
Start: 2021-11-03 | End: 2021-11-16

## 2021-11-03 RX ORDER — PERMETHRIN 50 MG/G
CREAM TOPICAL
Qty: 60 G | Refills: 1 | Status: SHIPPED | OUTPATIENT
Start: 2021-11-03 | End: 2021-11-16

## 2021-11-03 RX ORDER — TRAZODONE HYDROCHLORIDE 50 MG/1
50 TABLET, FILM COATED ORAL
COMMUNITY
Start: 2020-02-18 | End: 2021-11-16

## 2021-11-03 RX ORDER — CLOTRIMAZOLE 1 %
CREAM (GRAM) TOPICAL
COMMUNITY
Start: 2020-12-14 | End: 2021-11-16

## 2021-11-03 NOTE — TELEPHONE ENCOUNTER
Reason for Call:  Other appointment    Detailed comments: Patient would like to be seen for hives and itching.  The patient is restricted and can only see Dr. Ortiz and Dr. Henriquez.  The patient would like to be seen today.    Phone Number Patient can be reached at: Home number on file [phone] (home)    Best Time: Anytime    Can we leave a detailed message on this number? YES    Call taken on 11/3/2021 at 8:44 AM by Randa Elizabeth

## 2021-11-03 NOTE — TELEPHONE ENCOUNTER
RN closing as duplicate encounter. Noted this information in 'Telephone' encounter for same day 11/3/21    Leila Barr RN on 11/3/2021 at 3:53 PM

## 2021-11-03 NOTE — TELEPHONE ENCOUNTER
Triage note 11/3/21 for same issue:            Adrianne Brown, RNRegistered NurseSigned  2:56 PM                  []Hide copied text    []Chely for details      Hsa has had hives for one week.  He wants to be seen.  I triaged Hsa and his symptoms are non-life threateniing     We used a Radha interpretor.     Patient is restricted with his insurance.   He can only be seen by Dr Peggy Ortiz or Dr Mohamud.  We spoke to a  who will address this with the above providers. Neither provider has any opening for several weeks.        I used the Radha interpretor service again. I asked the interpretor to tell patient to be seen today in an urgent care.  Interpretor was # [id number]. Message was left on voicemail.

## 2021-11-03 NOTE — TELEPHONE ENCOUNTER
RN called again to schedule patient in a next/same day slot with Dr Henriquez 11/9. If he needs to be seen earlier UC as recommended earlier today may be most appropriate    Leila Barr RN on 11/3/2021 at 3:39 PM

## 2021-11-03 NOTE — TELEPHONE ENCOUNTER
Hsa has had hives for one week.  He wants to be seen.  I triaged Hsa and his symptoms are non-life threateniing    We used a Radha interpretor.    Patient is restricted with his insurance.   He can only be seen by Dr Peggy Ortiz or Dr Mohamud.  We spoke to a  who will address this with the above providers. Neither provider has any opening for several weeks.      I used the Radha interpretor service again. I asked the interpretor to tell patient to be seen today in an urgent care.  Interpretor was # [id number]. Message was left on voicemail.          Reason for Disposition    Patient wants to be seen    Additional Information    Negative: Life-threatening reaction in the past to similar substance (e.g., food, insect bite/sting, medication, etc.) and < 2 hours since exposure    Negative: Wheezing, stridor, hoarseness, or difficulty breathing    Negative: Tightness in the chest or throat and begins within 2 hours of exposure to allergic substance    Negative: Difficulty swallowing, drooling, or slurred speech    Negative: Difficult to awaken or acting confused (e.g., disoriented, slurred speech)    Negative: Unresponsive, passed out, or very weak    Negative: Other symptom of severe allergic reaction (Exception: Hives or facial swelling alone)    Negative: Sounds like a life-threatening emergency to the triager    Negative: Widespread hives, itching or facial swelling and onset < 2 hours of exposure to high-risk allergen (e.g., sting, nuts, 1st dose of antibiotic)    Negative: Vomiting or abdominal cramps and onset < 2 hours of exposure to high-risk allergen (e.g., sting, nuts, 1st dose of antibiotic)    Negative: Gave epinephrine shot and no symptoms now    Negative: Gave asthma inhaler or neb and no symptoms now    Protocols used: [id number]    Alea LORA RN [facility]

## 2021-11-03 NOTE — TELEPHONE ENCOUNTER
RN attempted to call patient with  twice at phone listed. Left VM to return call.   Patient should be triaged for potential allergic reaction/anaphylaxis by RN    Leila Barr RN on 11/3/2021 at 2:39 PM

## 2021-11-03 NOTE — TELEPHONE ENCOUNTER
RN closing. Patient being seen today at [facility]    Leila Barr RN on 11/3/2021 at 4:56 PM

## 2021-11-03 NOTE — TELEPHONE ENCOUNTER
Reason for Call:  Other appointment -- patient is a restricted recipient program. Can only see Dr. Ortiz or Dr. Henriquez only.     Detailed comments: see rn triage hives x1week    Phone Number Patient can be reached at: Home number on file [phone] (home)    Best Time: any     Can we leave a detailed message on this number? YES     Will need Radha  when calling.     Call taken on 11/3/2021 at 3:21 PM by Leann Begum

## 2021-11-04 NOTE — PATIENT INSTRUCTIONS
Patient Education     Scabies   Scabies is a skin infection. It's caused by a tiny parasitic mite that's too small to see directly. It can be seen under a microscope, but it's usually recognized only by the rash and symptoms it causes. This can make it hard to diagnose since the signs and symptoms can be similar to other diseases.  The scabies mite tunnels under the skin. It creates a small burrow, where it leaves its eggs. These eggs newell and grow into adults. They then create new burrows over the next 1 to 2 weeks. The mites die in about 4 to 6 weeks. The rash and itching are caused by an allergic reaction to the scabies saliva or feces.  Scabies is highly contagious. It's spread by direct skin contact. It's easily spread by close personal contact, sexual contact, or by sharing bed linens or clothing used by an infected person.  It may take 4 to 6 weeks for symptoms to appear after being exposed. Everyone living in the house with you, as well as your sexual partners, should be treated at the same time. After the first treatment, you will no longer be contagious. You may return to work, school or .  Home care    Machine wash in hot water all sheets, towels, pillowcases, underwear, pajamas, and any other clothing you have worn lately. Use the hot cycle of a dryer. If an item can't be laundered, dry clean the item.    Seal anything that is hard to wash in a plastic trash bag for at least 3 days, and possibly up to a week. This includes coats, jackets, blankets, and bedspreads. (The insects die after 3 days off the human body.)  Medicines  Scabicides  Medicines used to treat scabies are called scabicides. These are creams that kill the scabies mites. A prescription is needed. When using these medicines:    Always follow instructions provided by your healthcare provider and pharmacist. Also follow the printed instructions that come with the medicine.    Talk with your provider about precautions to take when  using these medicines.    Use the cream on your body when your skin is cool and dry. Don t use it after a hot shower or bath.    Follow your healthcare provider's instructions for applying the medicine. Usually the cream is put on your whole body. This means from your chin all the way down to your toes. Scabies doesn't usually affect an adult s head. So cream is not needed there. For children, discuss how to apply the medicine with your child s provider.    Leave the cream or lotion on for the recommended amount of time. This is usually 8 to 12 hours.    Don t leave cream or lotion on your skin longer than directed. Don t use more than recommended.    Wear clean clothes after the treatment.    If you wash your hands after using the cream, reapply the cream to your hands.    If you are breastfeeding, wash off your nipples before feeding. Then reapply the cream after breastfeeding.    For babies or infants, put mittens on their hands. This will stop them from licking the cream or lotion. It will also stop them from scratching themselves because of the itching.  Other medicines    An oral medicine called ivermectin may be prescribed for severe cases that don't respond to topical creams. It may also be used if you can t apply creams.    Itching may cause the most discomfort. If large areas of your skin are affected, over-the-counter antihistamines may be used to reduce itching. Or you may be given a prescription antihistamine. Some of these medicines may make you sleepy. They are best used at bedtime. Antihistamines that don t make you sleepy can be used during the day. Note: Don t use medicine that has diphenhydramine if you have glaucoma, or if you are a man who has trouble urinating due to an enlarged prostate.    If you were given antibiotics due to a bacterial infection, take them until they are finished. It's important to finish the antibiotics even if the wound looks better. This is to make sure the infection has  cleared.    Follow-up care  Follow up with your healthcare provider, or as advised. Call your provider if your symptoms don t improve after 1 week, or if new burrows or rashes appear.  When to seek medical advice  Call your healthcare provider right away if any of these occur:    Yellow-brown crusts or drainage from the sores    Other signs of infection, including increasing redness, swelling, pain, or pus    Fever of 100.4 F (38 C) or higher, or as directed by your provider  Nabila last reviewed this educational content on 8/1/2019 2000-2021 [employer]. All rights reserved. This information is not intended as a substitute for professional medical care. Always follow your healthcare professional's instructions.

## 2021-11-04 NOTE — PROGRESS NOTES
Assessment & Plan     Scabies  Discussed etiology and typical tx of scabies.    Recommend elimite as ordered.    PI given and discussed.    May use triamcinolone for pruritis as well as use benadryl 25-50 mg for pruritis.    Follow-up for persistent or worsening sx   discussed rash may take weeks to resolve but there should not be new or spreading rash.      - permethrin (ELIMITE) 5 % external cream  Dispense: 60 g; Refill: 1  - triamcinolone (KENALOG) 0.1 % external cream  Dispense: 60 g; Refill: 1     Quin Martino PA-C  [facility]    Subjective     Hsa is a 40 year old male who presents to clinic today for the following health issues:  Chief Complaint   Patient presents with     Derm Problem     Rash on arms and legs, dry skin and itchy x1wk     HPI  Rash started approximatly 1 week on forearms, spread up arms, to the R neck and now to posterior waisteline, and legs.    It is severely pruritic.    It keeps him up at night.    Denies topical irritants.    No fevers.    NO others at home with rash.       Review of Systems  Constitutional, HEENT, cardiovascular, pulmonary, gi and gu systems are negative, except as otherwise noted.        Patient Active Problem List   Diagnosis     Diabetes mellitus, type II (H)     Insomnia     Lump of skin     Severe major depression without psychotic features (H)     Chronic hepatitis C  (H)     Migraine     Noncompliance with medication regimen     Meibomian gland dysfunction     Current Outpatient Medications   Medication     insulin glargine (LANTUS SOLOSTAR) 100 UNIT/ML pen     insulin pen needle (32G X 4 MM) 32G X 4 MM miscellaneous     permethrin (ELIMITE) 5 % external cream     triamcinolone (KENALOG) 0.1 % external cream     ACCU-CHEK FASTCLIX LANCET DRUM     ACCU-CHEK GUIDE TEST STRIPS strips     acetaminophen (TYLENOL) 325 MG tablet     atorvastatin (LIPITOR) 20 MG tablet     benzonatate (TESSALON) 100 MG capsule     blood-glucose meter Misc      clotrimazole (LOTRIMIN) 1 % external cream     cyclobenzaprine (FLEXERIL) 10 MG tablet     famotidine (PEPCID) 20 MG tablet     fluticasone propionate (FLONASE) 50 mcg/actuation nasal spray     generic lancets     ibuprofen (ADVIL/MOTRIN) 600 MG tablet     meloxicam (MOBIC) 15 MG tablet     metFORMIN (GLUCOPHAGE) 1000 MG tablet     naproxen (NAPROSYN) 500 MG tablet     olopatadine (PATANOL) 0.1 % ophthalmic solution     ondansetron (ZOFRAN) 4 MG tablet     ondansetron (ZOFRAN-ODT) 4 MG ODT tab     polyethylene glycol (MIRALAX) 17 GM/Dose powder     prochlorperazine (COMPAZINE) 10 MG tablet     QUEtiapine (SEROQUEL) 50 MG tablet     sertraline (ZOLOFT) 50 MG tablet     sildenafil (REVATIO) 20 mg tablet     traZODone (DESYREL) 50 MG tablet     No current facility-administered medications for this visit.     Pt has questions about his metformin not being sent in to the pharmacy, review of notes indicates it has been so I directed him to ask his pharmacist again.  [facility] [address]      Objective    /83   Pulse 60   Temp 97.9  F (36.6  C) (Oral)   Resp 18   Wt 62.3 kg (137 lb 4.8 oz)   SpO2 100%   BMI 25.11 kg/m    Physical Exam exma of skin reveals B erythematos fine papules with excoriations dorsal hands, forearms, upper arm and R neck., B lower legs to the knee and posterior waste line.    No pustules, wheels, vesicles present.

## 2021-11-10 ENCOUNTER — OFFICE VISIT (OUTPATIENT)
Dept: URGENT CARE | Facility: URGENT CARE | Age: 41
End: 2021-11-10
Payer: COMMERCIAL

## 2021-11-10 VITALS
OXYGEN SATURATION: 100 % | BODY MASS INDEX: 25.75 KG/M2 | SYSTOLIC BLOOD PRESSURE: 125 MMHG | WEIGHT: 140.8 LBS | DIASTOLIC BLOOD PRESSURE: 85 MMHG | TEMPERATURE: 97.4 F | RESPIRATION RATE: 16 BRPM | HEART RATE: 81 BPM

## 2021-11-10 DIAGNOSIS — M25.512 ACUTE PAIN OF LEFT SHOULDER: Primary | ICD-10-CM

## 2021-11-10 PROCEDURE — 99215 OFFICE O/P EST HI 40 MIN: CPT | Performed by: PHYSICIAN ASSISTANT

## 2021-11-10 ASSESSMENT — ENCOUNTER SYMPTOMS
FEVER: 0
COUGH: 0
MYALGIAS: 1
WHEEZING: 0
FREQUENCY: 0
CONSTITUTIONAL NEGATIVE: 1
CARDIOVASCULAR NEGATIVE: 1
CHILLS: 0
SHORTNESS OF BREATH: 0
PALPITATIONS: 0
DIARRHEA: 0
HEMATURIA: 0
RESPIRATORY NEGATIVE: 1
DYSURIA: 0
SORE THROAT: 0
VOMITING: 0
GASTROINTESTINAL NEGATIVE: 1
HEADACHES: 0
ARTHRALGIAS: 1
ABDOMINAL PAIN: 0
NAUSEA: 0
NEUROLOGICAL NEGATIVE: 1
ALLERGIC/IMMUNOLOGIC NEGATIVE: 1
CHEST TIGHTNESS: 0

## 2021-11-10 NOTE — LETTER
[facility] [address] [address]          November 10, 2021    RE:  Pablo Russo                                                                                                                                                       [address] [place] [address]            To whom it may concern:    Pablo Russo is under my professional care for Acute pain of left shoulder He will be unable to work until cleared by his primary provider.    Sincerely,        Sergio Cohn PA-C

## 2021-11-10 NOTE — PATIENT INSTRUCTIONS
Patient Education     Shoulder Pain with Uncertain Cause   Shoulder pain can have many causes. Pain often comes from the structures that surround the shoulder joint. These are the joint capsule, ligaments, tendons, muscles, and bursa. Pain can also come from cartilage in the joint. Cartilage can become worn out or injured. It s important to know what s causing your pain so the healthcare provider can use the correct treatment. But sometimes it s difficult to find the exact cause of shoulder pain. You may need to see a specialist (orthopedist). You may also need special tests such as a CT scan or MRI. The provider may need to use special tools to look inside the joint (arthroscopy).  Shoulder pain can be treated with a sling or a device that keeps your shoulder from moving. You can take an anti-inflammatory medicine such as ibuprofen to ease pain. You may need to do special shoulder exercises. Follow up with a specialist if the pain is severe or doesn t go away after a few weeks.  Home care  Follow these tips when caring for yourself at home:    If a sling was given to you, leave it in place for the time advised by your healthcare provider. If you aren t sure how long to wear it, ask for advice. If the sling becomes loose, adjust it so that your forearm is level with the ground. Your shoulder should feel well supported.    Put an ice pack on the injured area for 20 minutes every 1 to 2 hours the first day. You can make your own ice pack by putting ice cubes in a plastic bag. Wrap the bag in a thin towel. Continue with ice packs 3 to 4 times a day for the next 2 days. Then use the pack as needed to ease pain and swelling.    You may use acetaminophen or ibuprofen to control pain, unless another pain medicine was prescribed. If you have chronic liver or kidney disease, talk with your healthcare provider before using these medicines. Also talk with your provider if you ve ever had a stomach ulcer or digestive  bleeding.    Shoulder pain may seem worse at night, when there is less to distract you from the pain. If you sleep on your side, try to keep weight off your painful shoulder. Propping pillows behind you may stop you from rolling over onto that shoulder during sleep.     Shoulder and elbow joints can become stiff if left in a sling for too long. You should start range of motion exercises about 7 to 10 days after the injury. Talk with your provider to find out what type of exercises to do and how soon to start.    You can take the sling off to shower or bathe.  Follow-up care  Follow up with your healthcare provider if you don t start to get better in the next 5 days.  When to seek medical advice  Call your healthcare provider right away if any of these occur:    Pain or swelling gets worse or continues for more than a few days    Your hand or fingers become cold, blue, numb, or tingly    Large amount of bruising on your shoulder or upper arm    Trouble moving your hand or fingers    Weakness in your hand or fingers    Your shoulder becomes stiff    It feels like your shoulder is popping out    You are less able to do your daily activities  Nabila last reviewed this educational content on 1/1/2020 2000-2021 [employer]. All rights reserved. This information is not intended as a substitute for professional medical care. Always follow your healthcare professional's instructions.

## 2021-11-10 NOTE — PROGRESS NOTES
Chief Complaint:    Chief Complaint   Patient presents with     Musculoskeletal Problem     Left arm pain for 3 weeks from a covid shot on 10/18/21 ([employer])         Medical Decision Making:    Vital signs reviewed by Sergio Cohn PA-C  /85 (BP Location: Right arm, Patient Position: Sitting, Cuff Size: Adult Regular)   Pulse 81   Temp 97.4  F (36.3  C) (Tympanic)   Resp 16   Wt 63.9 kg (140 lb 12.8 oz)   SpO2 100%   BMI 25.75 kg/m      Differential Diagnosis:  MS Injury Pain: tendonitis, muscle strain and contusion      ASSESSMENT:     1. Acute pain of left shoulder           PLAN:     Patient appears uncomfortable today.  He has reduced range of motion of the L shoulder due to pain. No indication of infection at this time.  No swelling or deformity.     Order placed for him to follow up with PT.  Rx for Voltaren gel sent in.  He was given a letter excusing him from work until cleared by his primary provider.    Staff assisted him in the room with Follow-up appointment with his PCP.    Worrisome symptoms discussed with instructions to go to the ED.  Patient verbalized understanding and agreed with this plan.    42 minutes was spent in the review of the patients chart, obtaining the history, examination, and plan due to  use.      Labs:     No results found for any visits on 11/10/21.    Current Meds:    Current Outpatient Medications:      diclofenac (VOLTAREN) 1 % topical gel, Apply 2 g topically 4 times daily, Disp: 100 g, Rfl: 0     ACCU-CHEK FASTCLIX LANCET DRUM, [ACCU-CHEK FASTCLIX LANCET DRUM] USE 1 EACH TO TEST DAILY. (Patient not taking: Reported on 11/10/2021), Disp: 100 each, Rfl: 3     ACCU-CHEK GUIDE TEST STRIPS strips, [ACCU-CHEK GUIDE TEST STRIPS STRIPS] Use 1 each As Directed daily. Dispense brand per patient's insurance at pharmacy discretion. (Patient not taking: Reported on 11/10/2021), Disp: 100 strip, Rfl: 3     acetaminophen (TYLENOL) 325 MG tablet, Take 650 mg by mouth  (Patient not taking: Reported on 11/3/2021), Disp: , Rfl:      atorvastatin (LIPITOR) 20 MG tablet, [ATORVASTATIN (LIPITOR) 20 MG TABLET] Take 1 tablet (20 mg total) by mouth daily. (Patient not taking: Reported on 11/3/2021), Disp: 90 tablet, Rfl: 3     benzonatate (TESSALON) 100 MG capsule, Take 100 mg by mouth (Patient not taking: Reported on 11/3/2021), Disp: , Rfl:      blood-glucose meter Misc, [BLOOD-GLUCOSE METER MISC] Use 1 application As Directed daily for 1 day., Disp: 1 each, Rfl: 0     clotrimazole (LOTRIMIN) 1 % external cream, , Disp: , Rfl:      cyclobenzaprine (FLEXERIL) 10 MG tablet, Take 10 mg by mouth (Patient not taking: Reported on 11/3/2021), Disp: , Rfl:      famotidine (PEPCID) 20 MG tablet, TAKE 1 TABLET(20 MG) BY MOUTH TWICE DAILY (Patient not taking: Reported on 11/3/2021), Disp: 180 tablet, Rfl: 0     fluticasone propionate (FLONASE) 50 mcg/actuation nasal spray, [FLUTICASONE PROPIONATE (FLONASE) 50 MCG/ACTUATION NASAL SPRAY] 2 sprays into each nostril daily. (Patient not taking: Reported on 11/3/2021), Disp: 18 g, Rfl: 11     generic lancets, [GENERIC LANCETS] Use 1 each As Directed daily. Dispense microlet lancets to use with his new Contour Next meter please - recently received accu-chek lancets which do not work with new meter (Patient not taking: Reported on 11/10/2021), Disp: 100 each, Rfl: 6     ibuprofen (ADVIL/MOTRIN) 600 MG tablet, Take 600 mg by mouth (Patient not taking: Reported on 11/3/2021), Disp: , Rfl:      insulin glargine (LANTUS SOLOSTAR) 100 UNIT/ML pen, Inject 20 Units Subcutaneous At Bedtime (Patient not taking: Reported on 11/10/2021), Disp: 15 mL, Rfl: 3     insulin pen needle (32G X 4 MM) 32G X 4 MM miscellaneous, Inject 1 each Subcutaneous daily (Patient not taking: Reported on 11/10/2021), Disp: 100 each, Rfl: 3     meloxicam (MOBIC) 15 MG tablet, [MELOXICAM (MOBIC) 15 MG TABLET] Take 1 tablet (15 mg total) by mouth daily. (Patient not taking: Reported on  11/3/2021), Disp: 90 tablet, Rfl: 3     metFORMIN (GLUCOPHAGE) 1000 MG tablet, Take 1 tablet (1,000 mg) by mouth 2 times daily (with meals) (Patient not taking: Reported on 11/3/2021), Disp: 180 tablet, Rfl: 3     naproxen (NAPROSYN) 500 MG tablet, , Disp: , Rfl:      olopatadine (PATANOL) 0.1 % ophthalmic solution, [OLOPATADINE (PATANOL) 0.1 % OPHTHALMIC SOLUTION] Administer 1 drop to both eyes 2 (two) times a day. (Patient not taking: Reported on 11/10/2021), Disp: 5 mL, Rfl: 3     ondansetron (ZOFRAN) 4 MG tablet, Take 4 mg by mouth (Patient not taking: Reported on 11/3/2021), Disp: , Rfl:      ondansetron (ZOFRAN-ODT) 4 MG ODT tab, Take 1 tablet (4 mg) by mouth every 8 hours as needed for vomiting (Patient not taking: Reported on 11/3/2021), Disp: 30 tablet, Rfl: 0     permethrin (ELIMITE) 5 % external cream, Apply to clean, dry hair and leave on overnight or for 8-14 hours before washing off with water. Repeat in 1 week if needed (Patient not taking: Reported on 11/10/2021), Disp: 60 g, Rfl: 1     polyethylene glycol (MIRALAX) 17 GM/Dose powder, Take 17 g by mouth (Patient not taking: Reported on 11/3/2021), Disp: , Rfl:      prochlorperazine (COMPAZINE) 10 MG tablet, Take 1 tablet (10 mg) by mouth every 6 hours as needed for nausea or vomiting (Patient not taking: Reported on 11/3/2021), Disp: 30 tablet, Rfl: 0     QUEtiapine (SEROQUEL) 50 MG tablet, [QUETIAPINE (SEROQUEL) 50 MG TABLET] Take 1 tablet (50 mg total) by mouth at bedtime. (Patient not taking: Reported on 11/3/2021), Disp: 90 tablet, Rfl: 3     sertraline (ZOLOFT) 50 MG tablet, Take 1 tablet (50 mg) by mouth daily (Patient not taking: Reported on 11/3/2021), Disp: 90 tablet, Rfl: 3     sildenafil (REVATIO) 20 mg tablet, [SILDENAFIL (REVATIO) 20 MG TABLET] Take 20 mg by mouth. (Patient not taking: Reported on 11/3/2021), Disp: , Rfl:      traZODone (DESYREL) 50 MG tablet, Take 50 mg by mouth (Patient not taking: Reported on 11/3/2021), Disp: , Rfl:       triamcinolone (KENALOG) 0.1 % external cream, Apply topically 2 times daily (Patient not taking: Reported on 11/10/2021), Disp: 60 g, Rfl: 1    Allergies:  No Known Allergies    SUBJECTIVE    HPI: Pablo Russo is an 40 year old male who presents for evaluation and treatment of L arm pain.   was used for the duration of this visit.  L arm pain started 3 weeks ago after he got his first COVID vaccination.  He continues to have L shoulder pain that extends up into his neck at times.  He has been taking a medicine similar to Tylenol and this helps.  He states that he can not work due to the pain.  He denies any other injury to the area.  No fever, or chills.      ROS:      Review of Systems   Constitutional: Negative.  Negative for chills and fever.   HENT: Negative.  Negative for sore throat.    Respiratory: Negative.  Negative for cough, chest tightness, shortness of breath and wheezing.    Cardiovascular: Negative.  Negative for chest pain and palpitations.   Gastrointestinal: Negative.  Negative for abdominal pain, diarrhea, nausea and vomiting.   Genitourinary: Negative for dysuria, frequency, hematuria and urgency.   Musculoskeletal: Positive for arthralgias and myalgias.   Skin: Negative for rash.   Allergic/Immunologic: Negative.  Negative for immunocompromised state.   Neurological: Negative.  Negative for headaches.        Family History   History reviewed. No pertinent family history.    Social History  Social History     Socioeconomic History     Marital status: Single     Spouse name: Not on file     Number of children: 0     Years of education: Not on file     Highest education level: Not on file   Occupational History     Not on file   Tobacco Use     Smoking status: Former Smoker     Smokeless tobacco: Never Used   Substance and Sexual Activity     Alcohol use: Yes     Drug use: Not Currently     Sexual activity: Not Currently     Partners: Female   Other Topics Concern     Not on file    Social History Narrative    6/2020:    - [place] refugee. Born in [facility]. Arrived [place] in [place]. He is not yet a [place] citizen.  - Never ; no children.  - Established care with Dr. Ortiz at [facility] in June 2020.    FAMILY HISTORY  - His parents and siblings remain  in [facility]    LIVING SITUATION  - Lives in a house with his grandparents, uncle, and several friends  - Household of 6    LANGUAGE(S) SPOKEN  - Radha    EMPLOYMENT  - Past employment:  at a restaurant  - Current employment: None    EDUCATION  - N one    Orthodox  - Confucianism     Social Determinants of Health     Financial Resource Strain: Not on file   Food Insecurity: Not on file   Transportation Needs: Not on file   Physical Activity: Not on file   Stress: Not on file   Social Connections: Not on file   Intimate Partner Violence: Not on file   Housing Stability: Not on file        Surgical History:  No past surgical history on file.     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   Constitutional:       General: He is not in acute distress.     Appearance: He is well-developed. He is not ill-appearing, toxic-appearing or diaphoretic.   HENT:      Head: Normocephalic and atraumatic.      Right Ear: Hearing, tympanic membrane, ear canal and external ear normal. Tympanic membrane is not perforated, erythematous, retracted or bulging.       Left Ear: Hearing, tympanic membrane, ear canal and external ear normal. Tympanic membrane is not perforated, erythematous, retracted or bulging.      Nose: Nose normal. No mucosal edema, congestion or rhinorrhea.      Mouth/Throat:      Pharynx: No oropharyngeal exudate or posterior oropharyngeal erythema.      Tonsils: No tonsillar exudate or tonsillar abscesses. 0 on the right. 0 on the left.   Eyes:      Pupils: Pupils are equal, round, and reactive to light.   Cardiovascular:      Rate and Rhythm: Normal rate and regular rhythm.      Heart sounds: Normal heart sounds, S1 normal and S2 normal. Heart sounds not distant. No murmur heard.  No friction rub. No gallop.    Pulmonary:      Effort: Pulmonary effort is normal. No respiratory distress.      Breath sounds: Normal breath sounds. No decreased breath sounds, wheezing, rhonchi or rales.   Abdominal:      General: Bowel sounds are normal. There is no distension.      Palpations: Abdomen is soft.      Tenderness: There is no abdominal tenderness.   Musculoskeletal:      Left shoulder: Tenderness present. No swelling, deformity, effusion, bony tenderness or crepitus. Decreased range of motion. Normal strength. Normal pulse.        Arms:       Cervical back: Normal range of motion and neck supple.      Comments: Neuro exam of L arm normal.  Pulses present and strong.     Lymphadenopathy:      Cervical: No cervical adenopathy.   Skin:     General: Skin is warm and dry.      Findings: No rash.   Neurological:      Mental Status: He is alert.      Cranial Nerves: No cranial nerve deficit.   Psychiatric:         Attention and Perception: He is attentive.         Speech: Speech normal.         Behavior: Behavior normal. Behavior is cooperative.         Thought Content: Thought content normal.         Judgment: Judgment normal.             Sergio Cohn PA-C  11/10/2021, 1:26 PM

## 2021-11-11 RX ORDER — TRIAMCINOLONE ACETONIDE 1 MG/G
CREAM TOPICAL 2 TIMES DAILY
Qty: 60 G | Refills: 1 | Status: CANCELLED | OUTPATIENT
Start: 2021-11-11

## 2021-11-11 RX ORDER — PERMETHRIN 50 MG/G
CREAM TOPICAL
Qty: 60 G | Refills: 1 | Status: SHIPPED | OUTPATIENT
Start: 2021-11-11 | End: 2021-11-12

## 2021-11-11 NOTE — TELEPHONE ENCOUNTER
Reason for Call:  Other prescription    Detailed comments: Pt stated that he has not been able to  his medications since 10/04/21. He has gone to the pharmacy 3 to 4 times, with no meds. TC Verified Pharmacy on file, pt confirmed. Pt stated that PCP needs to call Pharmacy as Pharmacist told him they need to talk to us. Please call Pharmacy, pt needs this done asap since he has not been able to take his meds for a few weeks now and is not feeling well.     Phone Number Patient can be reached at: [phone]    Best Time: ANY    Can we leave a detailed message on this number? YES    Call taken on 11/11/2021 at 2:34 PM by Mike Alberts

## 2021-11-11 NOTE — TELEPHONE ENCOUNTER
RN called pharmacy -patient is restricted to one prescriber Dr. CLARISA Henriquez at [facility]    RN called patient twice at listed phone and left a message via  to call the clinic      **CRITICAL INFORMATION FOR PATIENT**    Please tell patient if he wants ANY prescriptions he MUST come to his appointments at the clinic.    No one else can prescribe medications    [facility] (and every other pharmacy) will not dispense medications from other providers.     -he can not no-show appointments  -he can not expect same day appointments  -the original message did not specify what medications he needs, please clarify    please emphasize this information several times    He goes to the pharmacy and wants medication that will not be dispensed.     *Remind him he has upcoming appointments    Queued 3 prescription from Urgent care and routing to [place] for decision     RN spent 72 minutes resolving this issue    Leila Barr RN on 11/11/2021 at 5:32 PM

## 2021-11-12 DIAGNOSIS — Z76.0 ENCOUNTER FOR MEDICATION REFILL: Primary | ICD-10-CM

## 2021-11-12 DIAGNOSIS — E11.9 TYPE 2 DIABETES MELLITUS WITHOUT COMPLICATION, WITH LONG-TERM CURRENT USE OF INSULIN (H): ICD-10-CM

## 2021-11-12 DIAGNOSIS — Z79.4 TYPE 2 DIABETES MELLITUS WITHOUT COMPLICATION, WITH LONG-TERM CURRENT USE OF INSULIN (H): ICD-10-CM

## 2021-11-12 DIAGNOSIS — B86 SCABIES: ICD-10-CM

## 2021-11-12 RX ORDER — PERMETHRIN 50 MG/G
CREAM TOPICAL
Qty: 60 G | Refills: 1 | Status: SHIPPED | OUTPATIENT
Start: 2021-11-12 | End: 2022-04-27

## 2021-11-12 RX ORDER — INSULIN GLARGINE 100 [IU]/ML
20 INJECTION, SOLUTION SUBCUTANEOUS AT BEDTIME
Qty: 15 ML | Refills: 3 | Status: SHIPPED | OUTPATIENT
Start: 2021-11-12 | End: 2022-03-07

## 2021-11-12 NOTE — TELEPHONE ENCOUNTER
Routing to [facility] to attempt to reach him again    Leila Barr RN on 11/11/2021 at 6:02 PM

## 2021-11-12 NOTE — TELEPHONE ENCOUNTER
I have already prescribed his medication for scabies.  This was done yesterday he needs to come in to have the rest of his medications refilled as per his PCP he has not shown up for several visits

## 2021-11-12 NOTE — TELEPHONE ENCOUNTER
Called pharmacy to see why pt is restricted to provider when Dr. Ortiz is pt PCP.  That is due to insurance.  Pt has not shown for apt 54% of the time.  Since unable to reach pt.  Will send letter asking him exactly what medications he is needing.  Will send queued up meds to provider to fill please.  Pt had gone to [facility] and those providers are not able to prescribe meds for this pt.  Thanks

## 2021-11-12 NOTE — TELEPHONE ENCOUNTER
Per chart - pt reports not taking any of his meds.  Pt has upcoming appt with PCP on 11/24.  CMT feels pt need to either schedule appt with Pharm D to go over his medications or wait till PCP sees him.      [facility] will send letter to pt today relaying above.  Pt is restricted to either provider or PCP per insurance.  Thanks.

## 2021-11-15 ENCOUNTER — THERAPY VISIT (OUTPATIENT)
Dept: PHYSICAL THERAPY | Facility: CLINIC | Age: 41
End: 2021-11-15
Attending: PHYSICIAN ASSISTANT
Payer: COMMERCIAL

## 2021-11-15 DIAGNOSIS — M25.512 ACUTE PAIN OF LEFT SHOULDER: ICD-10-CM

## 2021-11-15 PROCEDURE — 97110 THERAPEUTIC EXERCISES: CPT | Mod: GP

## 2021-11-15 PROCEDURE — 97161 PT EVAL LOW COMPLEX 20 MIN: CPT | Mod: GP

## 2021-11-15 NOTE — PROGRESS NOTES
Physical Therapy Initial Evaluation  Subjective:  Pt reports L shoulder pain following his Covid vaccine in L arm about 4 wk ago that is gradually improving. Currently in 8/10 pain. He is using a topical balm for pain relief. Reports pain in his L neck and shoulder . Points to lateral shoulder. Reports pain with lifting arm. 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 He is R handed.    Goals: Want to improve pain in joint/muscle     The history is provided by the patient. The history is limited by a language barrier. A  was used (Radha  on phone).                       Objective:  Standing Alignment:      Shoulder/upper extremity deviations alignment: Scapula: anterior tilt with increased IR L>R. Dec upward rotation with elevation.                                       Shoulder Evaluation:  ROM:  AROM:    Flexion:  Left:  140*    Right:  140    Abduction:  Left: 140*   Right:  140    Internal Rotation:  Left:  T 7    Right:  T7  External Rotation:  Left:  55    Right:  55                  Pain: *=pain    Strength:    Flexion: Left:4/5   Pain:    Right: 4/5     Pain:     Abduction:  Left: 4/5  Pain:    Right: 4/5     Pain:    Internal Rotation:  Left:4/5     Pain:    Right: 4/5     Pain:  External Rotation:   Left:4/5     Pain:   Right:4/5     Pain:              Special Tests:    Left shoulder positive for the following special tests:  Impingement  Left shoulder negative for the following special tests:  Rotator cuff tear                                         General     ROS    Assessment/Plan:    Patient is a 40 year old male with left side shoulder complaints.    Patient has the following significant findings with corresponding treatment plan.                Diagnosis 1:  Scapular anterior tilt with internal rotation  Pain -  hot/cold therapy, electric stimulation, manual therapy,  splint/taping/bracing/orthotics, education and home program  Decreased ROM/flexibility - manual therapy and therapeutic exercise  Decreased strength - therapeutic exercise and therapeutic activities  Decreased function - therapeutic activities  Impaired posture - neuro re-education and therapeutic activities    Therapy Evaluation Codes:   1) History comprised of:   Personal factors that impact the plan of care:      Language.    Comorbidity factors that impact the plan of care are:      None.     Medications impacting care: None.  2) Examination of Body Systems comprised of:   Body structures and functions that impact the plan of care:      Cervical spine, Shoulder and Thoracic Spine.   Activity limitations that impact the plan of care are:      Driving, Dressing, Grasping, Lifting, Sports, Throwing and Working.  3) Clinical presentation characteristics are:   Stable/Uncomplicated.  4) Decision-Making    Low complexity using standardized patient assessment instrument and/or measureable assessment of functional outcome.  Cumulative Therapy Evaluation is: Low complexity.    Previous and current functional limitations:  (See Goal Flow Sheet for this information)    Short term and Long term goals: (See Goal Flow Sheet for this information)     Communication ability:  Patient has an  for communication clarity.  Treatment Explanation - The following has been discussed with the patient:   RX ordered/plan of care  Anticipated outcomes  Possible risks and side effects  This patient would benefit from PT intervention to resume normal activities.   Rehab potential is good.    Frequency:  1 X week, once daily  Duration:  for 8 weeks  Discharge Plan:  Achieve all LTG.  Independent in home treatment program.  Reach maximal therapeutic benefit.    Please refer to the daily flowsheet for treatment today, total treatment time and time spent performing 1:1 timed codes.     Minoo Marsh, PT, DPT

## 2021-11-15 NOTE — PATIENT INSTRUCTIONS
At [facility], we strive to deliver an exceptional experience to you, every time we see you. If you receive a survey, please complete it as we do value your feedback.  If you have MyChart, you can expect to receive results automatically within 24 hours of their completion.  Your provider will send a note interpreting your results as well.   If you do not have MyChart, you should receive your results in about a week by mail.    Your care team:                            Family Medicine Internal Medicine   MD Oneil Cota MD Shantel Branch-Fleming, MD Srinivasa Vaka, MD Katya Belousova, PAJEFFREY Kraft, APRN CNP    Herminio Ortiz, MD Pediatrics   Eliel Riddle, PAJEFFREY Andersen, CNP MD Michaela Pennington APRN CNP   MD Leela Metz MD Deborah Mielke, MD Nakia David, APRN [facility]      Clinic hours: Monday - Thursday 7 am-6 pm; Fridays 7 am-5 pm.   Urgent care: Monday - Friday 10 am- 8 pm; Saturday and Sunday 9 am-5 pm.    Clinic: [phone]       [facility]: Monday - Thursday 8 am - 7 pm; Friday 8 am - 6 pm  [facility]: [phone]     Use [email or web address] for 24/7 diagnosis and treatment of dozens of conditions.

## 2021-11-15 NOTE — PROGRESS NOTES
[facility]    OUTPATIENT Physical Therapy ORTHOPEDIC EVALUATION  PLAN OF TREATMENT FOR OUTPATIENT REHABILITATION  (COMPLETE FOR INITIAL CLAIMS ONLY)  Patient's Last Name, First Name, M.I.  YOB: 1980  KaranPablo  [facility]    Provider s Name:  [facility]   Medical Record No.  [id number]   Start of Care Date:   11/15/21   Onset Date:   11/10/21 (date of order)   Type:     _X__PT   ___OT Medical Diagnosis:    Encounter Diagnosis   Name Primary?     Acute pain of left shoulder         Treatment Diagnosis:   Scapular anterior tilt with internal rotation        Goals:     11/15/21 0500   Body Part   Goals listed below are for L Shoulder   Goal #1   Goal #1 lifting/carrying   Current Functional Level Cannot carry;Cannot lift   Performance level Pain 8/10   STG [employer] Performance Lift an item to counter height weighing;Carry an item weighing   Performance level 3 lb   Rationale for meal preparation;for housework such as laundry, emptying garbage, use of ;to perform job duties at work;for yard work such as shoveling snow   Due date 12/13/21   LTG [employer] Performance Lift an item overhead weighing;Carry an item weighing   Performance Level 5 lb   Rationale for meal preparation;for housework such as laundry, emptying garbage, use of ;for yard work such as shoveling snow;to perform job duties at work   Due date 01/10/22       Therapy Frequency:   1x/wk  Predicted Duration of Therapy Intervention:   8 wk    ARON DUONG, PT                 I CERTIFY THE NEED FOR THESE SERVICES FURNISHED UNDER        THIS PLAN OF TREATMENT AND WHILE UNDER MY CARE     (Physician co-signature of this document indicates review and certification of the therapy plan).                       Certification Date From:      Certification Date To:       Referring Provider:  Sergio Cee  [place]    Initial Assessment        See Epic Evaluation

## 2021-11-15 NOTE — PROGRESS NOTES
"    Subjective   Hsa is a 40 year old who presents for the following health issues:    HPI ROOMED PATIENT  ID# [id number]      Patient c/o left upper arm pain after his COVID-19 vaccine on 10/18/2021. Patient having difficulty lifting arm. No redness. No rash. No numbness or arm weakness.    Review of Systems   Constitutional, HEENT, cardiovascular, pulmonary, GI, , musculoskeletal, neuro, skin, endocrine and psych systems are negative, except as otherwise noted.      Objective    /77 (BP Location: Left arm, Patient Position: Chair, Cuff Size: Adult Regular)   Pulse 76   Temp 97.3  F (36.3  C) (Tympanic)   Ht 1.575 m (5' 2\")   Wt 62.1 kg (137 lb)   SpO2 97%   BMI 25.06 kg/m    Body mass index is 25.06 kg/m .  Physical Exam   GENERAL: healthy, alert and no distress  NECK: no adenopathy, no asymmetry, masses, or scars and thyroid normal to palpation  RESP: lungs clear to auscultation - no rales, rhonchi or wheezes  CV: regular rate and rhythm, normal S1 S2, no S3 or S4, no murmur, click or rub, no peripheral edema and peripheral pulses strong  ABDOMEN: soft, nontender, no hepatosplenomegaly, no masses and bowel sounds normal  MS: unable to fully abduct left arm    A/P:  (M25.512) Acute pain of left shoulder  (primary encounter diagnosis)  Comment:   Plan: Orthopedic  Referral        Patient will continue with physical therapy at this time. If no improvements in 2 months, patient will see Sports Medicine for further evaluation and recommendations. Possible shoulder capsulitis?    Herminio Ortiz MD          "

## 2021-11-16 ENCOUNTER — OFFICE VISIT (OUTPATIENT)
Dept: FAMILY MEDICINE | Facility: CLINIC | Age: 41
End: 2021-11-16
Payer: COMMERCIAL

## 2021-11-16 VITALS
HEART RATE: 76 BPM | HEIGHT: 62 IN | WEIGHT: 137 LBS | OXYGEN SATURATION: 97 % | DIASTOLIC BLOOD PRESSURE: 77 MMHG | BODY MASS INDEX: 25.21 KG/M2 | SYSTOLIC BLOOD PRESSURE: 121 MMHG | TEMPERATURE: 97.3 F

## 2021-11-16 DIAGNOSIS — M25.512 ACUTE PAIN OF LEFT SHOULDER: Primary | ICD-10-CM

## 2021-11-16 PROCEDURE — 99213 OFFICE O/P EST LOW 20 MIN: CPT | Performed by: FAMILY MEDICINE

## 2021-11-16 ASSESSMENT — PAIN SCALES - GENERAL: PAINLEVEL: NO PAIN (0)

## 2021-11-16 ASSESSMENT — MIFFLIN-ST. JEOR: SCORE: 1410.68

## 2021-11-24 ENCOUNTER — OFFICE VISIT (OUTPATIENT)
Dept: FAMILY MEDICINE | Facility: CLINIC | Age: 41
End: 2021-11-24
Payer: COMMERCIAL

## 2021-11-24 VITALS
TEMPERATURE: 98 F | SYSTOLIC BLOOD PRESSURE: 110 MMHG | BODY MASS INDEX: 25.21 KG/M2 | HEART RATE: 68 BPM | DIASTOLIC BLOOD PRESSURE: 74 MMHG | WEIGHT: 137 LBS | HEIGHT: 62 IN | OXYGEN SATURATION: 98 %

## 2021-11-24 DIAGNOSIS — F32.2 SEVERE MAJOR DEPRESSION WITHOUT PSYCHOTIC FEATURES (H): ICD-10-CM

## 2021-11-24 DIAGNOSIS — E11.65 TYPE 2 DIABETES MELLITUS WITH HYPERGLYCEMIA, WITH LONG-TERM CURRENT USE OF INSULIN (H): ICD-10-CM

## 2021-11-24 DIAGNOSIS — Z79.4 TYPE 2 DIABETES MELLITUS WITH HYPERGLYCEMIA, WITH LONG-TERM CURRENT USE OF INSULIN (H): ICD-10-CM

## 2021-11-24 DIAGNOSIS — M54.50 CHRONIC LOW BACK PAIN, UNSPECIFIED BACK PAIN LATERALITY, UNSPECIFIED WHETHER SCIATICA PRESENT: Primary | ICD-10-CM

## 2021-11-24 DIAGNOSIS — Z91.148 NONCOMPLIANCE WITH MEDICATION REGIMEN: ICD-10-CM

## 2021-11-24 DIAGNOSIS — G89.29 CHRONIC LOW BACK PAIN, UNSPECIFIED BACK PAIN LATERALITY, UNSPECIFIED WHETHER SCIATICA PRESENT: Primary | ICD-10-CM

## 2021-11-24 DIAGNOSIS — F51.01 PRIMARY INSOMNIA: ICD-10-CM

## 2021-11-24 PROCEDURE — 99215 OFFICE O/P EST HI 40 MIN: CPT | Performed by: FAMILY MEDICINE

## 2021-11-24 RX ORDER — QUETIAPINE FUMARATE 50 MG/1
50 TABLET, FILM COATED ORAL AT BEDTIME
Qty: 90 TABLET | Refills: 3 | Status: SHIPPED | OUTPATIENT
Start: 2021-11-24 | End: 2022-03-07

## 2021-11-24 RX ORDER — CYCLOBENZAPRINE HCL 10 MG
10 TABLET ORAL 2 TIMES DAILY PRN
Qty: 30 TABLET | Refills: 1 | Status: SHIPPED | OUTPATIENT
Start: 2021-11-24 | End: 2022-03-07

## 2021-11-24 ASSESSMENT — MIFFLIN-ST. JEOR: SCORE: 1410.68

## 2021-11-24 NOTE — PROGRESS NOTES
"ASSESSMENT/PLAN:   Pablo was seen today for recheck.    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  The only thing important to me is my citizenship waiver.\"  I explained to him again that he has significant legal issues that are going to be a large barrier for him to get citizenship and until he take steps to rectify that, there is no reason to get a waiver that will eventually .    I cannot represcribe his medications.  He picked them up 10 days ago for 90-day supplies.  That is his responsibility to figure out where he put them in his home.  He has for insulin pens at home, he says they are broken.  I asked him to come in and bring them for our nurses, pharmacist, or need to see them.  He says he does not want to wake up in the morning just to come down here for help.    I offered to replace insulin with at least an oral medication alternative, he again said that he does not care about the medications, he wants a citizenship waiver.  I told him without his medication, he will die if his diabetes gets out of control.  He again stated that he does not care, he wants a citizenship waiver.    Return in about 2 months (around 2022) for Medication check.       ======================================================    SUBJECTIVE  Pablo Russo is a 40 year old male here for follow-up medications, diabetes.  His main concern is " citizenship waiver.    Citizenship waiver.  Our care team has worked with him on citizenship.  He has a complicated legal issue that involves paying off a significant amount of finds before he would be eligible for citizenship.  Our  has discussed this with him before, he was very upset and said he could get citizenship done elsewhere if he paid a lot of money.  Today, patient says he has never spoken to our  about this, he does not know anything about his legal problems, denies any legal problems, and denies ever being told that this was a barrier to getting citizenship.  Extensive documentation in his chart about this issue.  I discussed again that he will not qualify for citizenship because of this legal issue, if we start working on a citizenship waiver, and he has no intention of paying off any of his legal dues, the waiver will  if he even qualifies for 1 and he would have to do another one.    Diabetes.  Patient has been going to the ER frequently, claiming that no one will fill his insulin, no one will give him his medications.  On chart review and multiple telephone visits, medications are always at the pharmacy, patient never checks, rarely picks them up, then denies picking them up.  I asked when the last time he picked up his insulin was, he said it has been at least 2 to 3 months.  I asked him again because the chart says he picked them up on 2021, he says maybe he picked them up in October.  I told him again that the pharmacy shows he picked them up in November, he says maybe he did pick them up in November but he does not have anymore.  I asked him if he had any insulin at home, he said no I do not have any insulin, I only have 4 pens of insulin.  Radha  made him repeat this several times, he says that he does not have any medications, he picked up the medications in November, nothing is working, he still has some medications at home.      ROS  Complete  "10 point review of systems negative except as noted above in HPI      OBJECTIVE  /74 (BP Location: Left arm, Patient Position: Sitting, Cuff Size: Adult Regular)   Pulse 68   Temp 98  F (36.7  C) (Oral)   Ht 1.575 m (5' 2\")   Wt 62.1 kg (137 lb)   SpO2 98%   BMI 25.06 kg/m       General: Cooperative, pleasant, in no acute distress    CV: RRR, normal S1/S2, no murmur, rubs, gallops  Resp: No respiratory distress. Clear to auscultation bilaterally. No wheezes, rales, rhonchi  Abd: Nontender, nondistended, bowel sounds present  Ext: radial/pedal pulses +2 bilaterally  MSK: Normal muscle tone  Neuro: CN II-XII intact  Skin: warm, well perfused. No rashes  Psych: No suicidal or homicidal ideations, no self-harm.  Normal affect.    LABS & IMAGES   No results found for any visits on 11/24/21.      ======================================================  45 minutes spent on the date of the encounter doing chart review, history and exam, documentation and further activities per the note    Visit was completed along with Radha phone     Options for treatment and follow-up care were reviewed with the patient. Hsa M Karan and/or guardian was engaged and actively involved in the decision making process. Hsa M Karan and/or guardian verbalized understanding of the options discussed and was satisfied with the final plan.      Peggy Ortiz MD          "

## 2021-11-24 NOTE — LETTER
November 24, 2021      Pablo Russo  [address] [place] [address]        To Whom It May Concern:    Pablo Russo was seen in our clinic. Please, excuse his absence.    Sincerely,        Peggy Ortiz MD          
Cold/Sinus

## 2021-11-25 ASSESSMENT — PATIENT HEALTH QUESTIONNAIRE - PHQ9: SUM OF ALL RESPONSES TO PHQ QUESTIONS 1-9: 6

## 2021-12-17 ENCOUNTER — TELEPHONE (OUTPATIENT)
Dept: FAMILY MEDICINE | Facility: CLINIC | Age: 41
End: 2021-12-17
Payer: COMMERCIAL

## 2021-12-17 NOTE — TELEPHONE ENCOUNTER
Patient is calling again with an interp and needs questions answered regarding his citizenship?      Message will need to wait until Monday when PCP is back.  Pt does have appt with PCP 1/18/22.  Thanks

## 2021-12-17 NOTE — TELEPHONE ENCOUNTER
"Reason for Call:  Other, Citizenship    Detailed comments: Pt called to f/u on referral for citizenship waiver. TC did not see any referrals from last visit. He said he was told someone will reach out to him. TC notified pt of MD notes below and he denies ever speaking to a  regarding this and has no legal issues. He wants to know what else PCP would recommend. He will try to reach out to his [facility] Worker Atul to assist him as well. See note from visit below -     Type 2 diabetes mellitus with hyperglycemia, with long-term current use of insulin (H)  Noncompliance with medication regimen  Patient also said \"I do not care about the medicine, the medicine is not important.  The only thing important to me is my citizenship waiver.\"  I explained to him again that he has significant legal issues that are going to be a large barrier for him to get citizenship and until he take steps to rectify that, there is no reason to get a waiver that will eventually .     I cannot represcribe his medications.  He picked them up 10 days ago for 90-day supplies.  That is his responsibility to figure out where he put them in his home.  He has for insulin pens at home, he says they are broken.  I asked him to come in and bring them for our nurses, pharmacist, or need to see them.  He says he does not want to wake up in the morning just to come down here for help.     I offered to replace insulin with at least an oral medication alternative, he again said that he does not care about the medications, he wants a citizenship waiver.  I told him without his medication, he will die if his diabetes gets out of control.  He again stated that he does not care, he wants a citizenship waiver.     Return in about 2 months (around 2022) for Medication check.         ======================================================     SUBJECTIVE  Pablo Russo is a 40 year old male here for follow-up medications, diabetes.  His main concern " is citizenship waiver.     Citizenship waiver.  Our care team has worked with him on citizenship.  He has a complicated legal issue that involves paying off a significant amount of finds before he would be eligible for citizenship.  Our  has discussed this with him before, he was very upset and said he could get citizenship done elsewhere if he paid a lot of money.  Today, patient says he has never spoken to our  about this, he does not know anything about his legal problems, denies any legal problems, and denies ever being told that this was a barrier to getting citizenship.  Extensive documentation in his chart about this issue.  I discussed again that he will not qualify for citizenship because of this legal issue, if we start working on a citizenship waiver, and he has no intention of paying off any of his legal dues, the waiver will  if he even qualifies for 1 and he would have to do another one.    Phone Number Patient can be reached at: Cell number on file:    Telephone Information:   Mobile [phone]       Best Time: anytime    Can we leave a detailed message on this number? YES    Call taken on 2021 at 1:55 PM by Reese Mohamud

## 2021-12-20 NOTE — CONFIDENTIAL NOTE
Patient has been notified several times that he needs to speak with his [facility] worker about citizenship waiver.     We will not refer him for citizenship waiver and he currently does not qualify for citizenship due to legal issues. See past notes for all the times he has been notified.     He is nonadherent to medications and has had frequent visits to the ER due to this.   He refuses medications as indicated in past notes because he says we need to give him citizenship waiver for him to take his medications.     I am concerned about his mental health but he refuses all services.     I have never told him that social work would reach out to him.   Social work and [facility] are very aware of his situation. Will forward for their review, but he needs to speak to his [facility] worker and mental health team.       Peggy Ortiz MD

## 2022-01-06 NOTE — PROGRESS NOTES
Patient seen for one time evaluation and treatment.  Patient did not return for further treatment and current status is unknown.  Please see initial evaluation for further information.    Minoo Marsh, PT, DPT

## 2022-01-18 VITALS
RESPIRATION RATE: 16 BRPM | BODY MASS INDEX: 24.55 KG/M2 | HEIGHT: 61 IN | TEMPERATURE: 98 F | WEIGHT: 130 LBS | HEART RATE: 80 BPM | OXYGEN SATURATION: 97 % | DIASTOLIC BLOOD PRESSURE: 80 MMHG | SYSTOLIC BLOOD PRESSURE: 110 MMHG

## 2022-01-18 VITALS
RESPIRATION RATE: 18 BRPM | SYSTOLIC BLOOD PRESSURE: 98 MMHG | TEMPERATURE: 98.3 F | WEIGHT: 133.06 LBS | HEIGHT: 61 IN | HEART RATE: 84 BPM | DIASTOLIC BLOOD PRESSURE: 70 MMHG | BODY MASS INDEX: 25.12 KG/M2

## 2022-01-18 VITALS
BODY MASS INDEX: 24.81 KG/M2 | TEMPERATURE: 98.2 F | HEART RATE: 89 BPM | DIASTOLIC BLOOD PRESSURE: 68 MMHG | WEIGHT: 131.4 LBS | SYSTOLIC BLOOD PRESSURE: 100 MMHG | HEIGHT: 61 IN | OXYGEN SATURATION: 98 %

## 2022-01-18 VITALS
BODY MASS INDEX: 25.38 KG/M2 | HEART RATE: 80 BPM | HEIGHT: 61 IN | RESPIRATION RATE: 20 BRPM | SYSTOLIC BLOOD PRESSURE: 106 MMHG | DIASTOLIC BLOOD PRESSURE: 72 MMHG | TEMPERATURE: 98 F | WEIGHT: 134.44 LBS

## 2022-01-18 VITALS
WEIGHT: 131 LBS | BODY MASS INDEX: 24.11 KG/M2 | DIASTOLIC BLOOD PRESSURE: 76 MMHG | SYSTOLIC BLOOD PRESSURE: 110 MMHG | OXYGEN SATURATION: 99 % | HEART RATE: 81 BPM | TEMPERATURE: 98.3 F | RESPIRATION RATE: 16 BRPM | HEIGHT: 62 IN

## 2022-01-18 VITALS
RESPIRATION RATE: 16 BRPM | DIASTOLIC BLOOD PRESSURE: 62 MMHG | SYSTOLIC BLOOD PRESSURE: 116 MMHG | HEART RATE: 89 BPM | OXYGEN SATURATION: 99 % | WEIGHT: 129 LBS | TEMPERATURE: 98 F

## 2022-01-18 VITALS
TEMPERATURE: 98.2 F | SYSTOLIC BLOOD PRESSURE: 100 MMHG | HEART RATE: 86 BPM | OXYGEN SATURATION: 97 % | RESPIRATION RATE: 16 BRPM | DIASTOLIC BLOOD PRESSURE: 60 MMHG | WEIGHT: 132 LBS

## 2022-01-18 VITALS
DIASTOLIC BLOOD PRESSURE: 80 MMHG | BODY MASS INDEX: 24.55 KG/M2 | HEART RATE: 86 BPM | OXYGEN SATURATION: 97 % | HEIGHT: 61 IN | WEIGHT: 130 LBS | SYSTOLIC BLOOD PRESSURE: 110 MMHG | RESPIRATION RATE: 20 BRPM | TEMPERATURE: 98 F

## 2022-01-18 ASSESSMENT — PATIENT HEALTH QUESTIONNAIRE - PHQ9
SUM OF ALL RESPONSES TO PHQ QUESTIONS 1-9: 9
SUM OF ALL RESPONSES TO PHQ QUESTIONS 1-9: 9

## 2022-01-20 ENCOUNTER — TELEPHONE (OUTPATIENT)
Dept: FAMILY MEDICINE | Facility: CLINIC | Age: 42
End: 2022-01-20
Payer: MEDICAID

## 2022-01-20 NOTE — TELEPHONE ENCOUNTER
Reason for Call:  Other Citizenship waiver    Detailed comments: Patient called with  requesting a call to assist in applying for citizenship waiver. Please call patient before 12 pm to assist.    Phone Number Patient can be reached at: Home number on file [phone] (home)    Best Time: any    Can we leave a detailed message on this number? YES    Call taken on 1/20/2022 at 1:39 PM by Bakari Bernal

## 2022-01-28 ENCOUNTER — PATIENT OUTREACH (OUTPATIENT)
Dept: CARE COORDINATION | Facility: CLINIC | Age: 42
End: 2022-01-28
Payer: MEDICAID

## 2022-01-28 DIAGNOSIS — Z71.89 COMPLEX CARE COORDINATION: Primary | ICD-10-CM

## 2022-01-30 NOTE — TELEPHONE ENCOUNTER
Please see previous communications in patient's chart regarding his request for a citizenship waiver.    He has not met or spoken with me since 2020, and my recommendations remain the same. He needs to seek  regarding all citizenship matters and refuses to do so. I have given him the following low-income legal resource in the past and it can be provided to him again if he no longer has it.    [facility] Legal Services, Intake Line: [phone]    I am happy to stop in the room briefly next time he is in-clinic to see his PCP and reiterate my recommendations, but I will not be calling him to discuss this over the phone, nor will I be providing any referrals that his PCP has already said no to.    Priscilla MORELANDW, LSW  1/30/2022

## 2022-01-30 NOTE — PROGRESS NOTES
Clinic Care Coordination Contact    Spoke with Kayla from [facility] to clarify whether or not patient is still working with an [facility] provider and/or therapist through their refugee mental health program. Per Kayla, he was last seen in-person by his [facility] provider Atul on 6/18/21. The last time he was reachable by a [facility] provider was on 8/18/21. Patient was ultimately discharged from their services in late September 2021.    Priscilla Lou, MERLYW, LSW  1/28/2022

## 2022-02-18 NOTE — TELEPHONE ENCOUNTER
Patient called back with  to check on the status of this. Patient stated that he has not been able to get in contact with his Count includes the [facility] worker. Advised patient that a message will be sent to . Also provided [facility] phone number for patient to contact.

## 2022-02-18 NOTE — TELEPHONE ENCOUNTER
"Provider and/or  and/or nurse will not call patient about citizenship waver.    Patient has been told the same information multiple times by multiple providers that he must resolve legal issues via [facility] Services [phone].    Provider and/or  and/or nurse will not call patient about citizenship waver until this is done.   ______________________________________________      RN called patient and relayed information in this encounter from Priscilla (). Provided legal resource number and stated \"Dr Ortiz explained this in your last appointment, legal issues must be resolved before citizenship waver\"    Patient took number    Patient must resolve legal issues before any citizenship waver will be addressed. He has been given this information by multiple people multiple times and he refuses to address the problem.     If you have questions please refer to previous notes in this encounter or visit notes.     Leila Barr RN on 2/18/2022 at 3:38 PM              "

## 2022-03-07 DIAGNOSIS — E11.9 TYPE 2 DIABETES MELLITUS WITHOUT COMPLICATION, WITH LONG-TERM CURRENT USE OF INSULIN (H): ICD-10-CM

## 2022-03-07 DIAGNOSIS — M54.50 CHRONIC LOW BACK PAIN, UNSPECIFIED BACK PAIN LATERALITY, UNSPECIFIED WHETHER SCIATICA PRESENT: ICD-10-CM

## 2022-03-07 DIAGNOSIS — G89.29 CHRONIC LOW BACK PAIN, UNSPECIFIED BACK PAIN LATERALITY, UNSPECIFIED WHETHER SCIATICA PRESENT: ICD-10-CM

## 2022-03-07 DIAGNOSIS — Z76.0 ENCOUNTER FOR MEDICATION REFILL: ICD-10-CM

## 2022-03-07 DIAGNOSIS — F51.01 PRIMARY INSOMNIA: ICD-10-CM

## 2022-03-07 DIAGNOSIS — Z79.4 TYPE 2 DIABETES MELLITUS WITHOUT COMPLICATION, WITH LONG-TERM CURRENT USE OF INSULIN (H): ICD-10-CM

## 2022-03-07 DIAGNOSIS — Z71.89 COMPLEX CARE COORDINATION: Primary | ICD-10-CM

## 2022-03-07 RX ORDER — QUETIAPINE FUMARATE 50 MG/1
50 TABLET, FILM COATED ORAL AT BEDTIME
Qty: 90 TABLET | Refills: 3 | Status: SHIPPED | OUTPATIENT
Start: 2022-03-07 | End: 2022-04-27

## 2022-03-07 RX ORDER — INSULIN GLARGINE 100 [IU]/ML
20 INJECTION, SOLUTION SUBCUTANEOUS AT BEDTIME
Qty: 15 ML | Refills: 3 | Status: SHIPPED | OUTPATIENT
Start: 2022-03-07 | End: 2022-04-27

## 2022-03-07 RX ORDER — CYCLOBENZAPRINE HCL 10 MG
10 TABLET ORAL 2 TIMES DAILY PRN
Qty: 30 TABLET | Refills: 1 | Status: SHIPPED | OUTPATIENT
Start: 2022-03-07 | End: 2022-04-27

## 2022-03-07 RX ORDER — ATORVASTATIN CALCIUM 20 MG/1
20 TABLET, FILM COATED ORAL DAILY
Qty: 90 TABLET | Refills: 3 | Status: SHIPPED | OUTPATIENT
Start: 2022-03-07 | End: 2022-04-27

## 2022-03-10 ENCOUNTER — ALLIED HEALTH/NURSE VISIT (OUTPATIENT)
Dept: NURSING | Facility: CLINIC | Age: 42
End: 2022-03-10
Payer: MEDICAID

## 2022-03-10 ENCOUNTER — PATIENT OUTREACH (OUTPATIENT)
Dept: CARE COORDINATION | Facility: CLINIC | Age: 42
End: 2022-03-10
Payer: MEDICAID

## 2022-03-10 ENCOUNTER — PATIENT OUTREACH (OUTPATIENT)
Dept: NURSING | Facility: CLINIC | Age: 42
End: 2022-03-10
Payer: MEDICAID

## 2022-03-10 DIAGNOSIS — Z71.89 COMPLEX CARE COORDINATION: ICD-10-CM

## 2022-03-10 PROCEDURE — 99207 PR NO CHARGE LOS: CPT

## 2022-03-10 SDOH — ECONOMIC STABILITY: FOOD INSECURITY: WITHIN THE PAST 12 MONTHS, THE FOOD YOU BOUGHT JUST DIDN'T LAST AND YOU DIDN'T HAVE MONEY TO GET MORE.: NEVER TRUE

## 2022-03-10 SDOH — HEALTH STABILITY: PHYSICAL HEALTH: ON AVERAGE, HOW MANY MINUTES DO YOU ENGAGE IN EXERCISE AT THIS LEVEL?: 10 MIN

## 2022-03-10 SDOH — ECONOMIC STABILITY: FOOD INSECURITY: WITHIN THE PAST 12 MONTHS, YOU WORRIED THAT YOUR FOOD WOULD RUN OUT BEFORE YOU GOT MONEY TO BUY MORE.: NEVER TRUE

## 2022-03-10 SDOH — ECONOMIC STABILITY: INCOME INSECURITY: IN THE LAST 12 MONTHS, WAS THERE A TIME WHEN YOU WERE NOT ABLE TO PAY THE MORTGAGE OR RENT ON TIME?: NO

## 2022-03-10 SDOH — HEALTH STABILITY: PHYSICAL HEALTH: ON AVERAGE, HOW MANY DAYS PER WEEK DO YOU ENGAGE IN MODERATE TO STRENUOUS EXERCISE (LIKE A BRISK WALK)?: 5 DAYS

## 2022-03-10 SDOH — ECONOMIC STABILITY: TRANSPORTATION INSECURITY
IN THE PAST 12 MONTHS, HAS THE LACK OF TRANSPORTATION KEPT YOU FROM MEDICAL APPOINTMENTS OR FROM GETTING MEDICATIONS?: NO

## 2022-03-10 SDOH — ECONOMIC STABILITY: TRANSPORTATION INSECURITY
IN THE PAST 12 MONTHS, HAS LACK OF TRANSPORTATION KEPT YOU FROM MEETINGS, WORK, OR FROM GETTING THINGS NEEDED FOR DAILY LIVING?: NO

## 2022-03-10 ASSESSMENT — ACTIVITIES OF DAILY LIVING (ADL): DEPENDENT_IADLS:: INDEPENDENT

## 2022-03-10 ASSESSMENT — SOCIAL DETERMINANTS OF HEALTH (SDOH)
IN A TYPICAL WEEK, HOW MANY TIMES DO YOU TALK ON THE PHONE WITH FAMILY, FRIENDS, OR NEIGHBORS?: MORE THAN THREE TIMES A WEEK
WITHIN THE LAST YEAR, HAVE TO BEEN RAPED OR FORCED TO HAVE ANY KIND OF SEXUAL ACTIVITY BY YOUR PARTNER OR EX-PARTNER?: NO
HOW OFTEN DO YOU ATTENT MEETINGS OF THE CLUB OR ORGANIZATION YOU BELONG TO?: 1 TO 4 TIMES PER YEAR
WITHIN THE LAST YEAR, HAVE YOU BEEN AFRAID OF YOUR PARTNER OR EX-PARTNER?: NO
WITHIN THE LAST YEAR, HAVE YOU BEEN HUMILIATED OR EMOTIONALLY ABUSED IN OTHER WAYS BY YOUR PARTNER OR EX-PARTNER?: NO
HOW OFTEN DO YOU ATTEND CHURCH OR RELIGIOUS SERVICES?: MORE THAN 4 TIMES PER YEAR
WITHIN THE LAST YEAR, HAVE YOU BEEN KICKED, HIT, SLAPPED, OR OTHERWISE PHYSICALLY HURT BY YOUR PARTNER OR EX-PARTNER?: NO
HOW HARD IS IT FOR YOU TO PAY FOR THE VERY BASICS LIKE FOOD, HOUSING, MEDICAL CARE, AND HEATING?: NOT VERY HARD
ARE YOU MARRIED, WIDOWED, DIVORCED, SEPARATED, NEVER MARRIED, OR LIVING WITH A PARTNER?: NEVER MARRIED
DO YOU BELONG TO ANY CLUBS OR ORGANIZATIONS SUCH AS CHURCH GROUPS UNIONS, FRATERNAL OR ATHLETIC GROUPS, OR SCHOOL GROUPS?: NO
HOW OFTEN DO YOU GET TOGETHER WITH FRIENDS OR RELATIVES?: MORE THAN THREE TIMES A WEEK

## 2022-03-10 ASSESSMENT — LIFESTYLE VARIABLES
HOW OFTEN DO YOU HAVE A DRINK CONTAINING ALCOHOL: NEVER
HOW OFTEN DO YOU HAVE SIX OR MORE DRINKS ON ONE OCCASION: NEVER

## 2022-03-10 NOTE — LETTER
[facility] CARE COORDINATION    March 16, 2022    [facility] ARLEY Karan  [address]  [place] [address]      Dear Pablo,    I am a clinic care coordinator who works with Peggy Ortiz MD. I wanted to thank you for spending the time to talk with me.  Below is a description of clinic care coordination and how I can further assist you.      The clinic care coordination team is made up of a registered nurse,  and community health worker who understand the health care system. The goal of clinic care coordination is to help you manage your health and improve access to the health care system in the most efficient manner. The team can assist you in meeting your health care goals by providing education, coordinating services, strengthening the communication among your providers and supporting you with any resource needs.    Please feel free to contact me and the Community Health Worker at [phone] with any questions or concerns. We are focused on providing you with the highest-quality healthcare experience possible and that all starts with you.     Sincerely,     Glenys Lantigua RN    Enclosed: I have enclosed a copy of the Patient Centered Plan of Care. This has helpful information and goals that we have talked about. Please keep this in an easy to access place to use as needed.

## 2022-03-10 NOTE — PROGRESS NOTES
Clinic Care Coordination Contact  Due Date: Within 2 weeks from today's date, 3/10/2022  Delegation: No Show for RN appointment. Please follow unsuccessful outreach, no show standard work.    Unable to reach patient via phone on 3/8/2022 x3.

## 2022-03-10 NOTE — PROGRESS NOTES
Clinic Care Coordination Contact    Clinic Care Coordination Contact  OUTREACH    Referral Information:  Referral Source: Care Team    Primary Diagnosis: Diabetes    Chief Complaint   Patient presents with     Clinic Care Coordination - Initial     Clinic Utilization  Difficulty keeping appointments:: Yes  Compliance Concerns: Yes  No-Show Concerns: Yes  No PCP office visit in Past Year: No  Utilization    Hospital Admissions  0             ED Visits  2             No Show Count (past year)  20                Current as of: 3/10/2022 10:41 AM              Clinical Concerns:  CC RN completed assessment with patient in person today. Patient was referral to [facility] by a pharmacist to assist with med refill concerns. Patient formally enrolled with [facility]. Long hx of non-compliance with treatment and med compliance due to language barrier, lack of knowledge and possible cognitive impairment. Per patient, he's willing to work with [facility] team again for med refill, citizenship process, and community resources.     1) Med refill concerns  - Currently restricted only Dr Henriquez and Paige. CC RN left a message for medical assistance restricted program. Per [facility] restricted program, CC RN needs to call [facility] directly. CC RN left a voicemail with [facility] restricted program.   - CC RN plans to follow up on med refill pending a call back from [facility]    2) Citizenship  - Green card will  on 2029. Resident in [place] since 2005  - Per chart review, patient needs to resolve legal issues first before applying for citizenship. Patient states someone called him but he doesn't think he has any legal issues but he's willing to resolve it if there's any legal issues. I do not think patient understands or believe that he has legal issues due to low literacy.   - Patient agrees to work on resolving legal issues with disability specialists after much education from CC RN today.   - Patient states he gets  confused easily when too many people involved and agreed to come back to see CC RN in person to be connected to disability specialists. CC RN will consult with CC SW as needed.       3) Insurance card  - Wants insurance card through [facility]  - W or [facility] worker to assist.   - agrees to [facility] service. Patient gave verbal okay to referral him to [facility] service to assist him with citizenship process and community resources.     4) [facility] referral  - will f/u with next outreach    5) Preventative care  - Assisted patient scheduled preventative care appt with PCP on 5/4/2022 at 3:20pm.     Pain  Pain (GOAL):: Yes  Type: Chronic (>3mo)  Location of chronic pain:: low back pain  Radiating: No  Progression: Unchanged  Description of pain: Aching, Nagging  Chronic pain severity:: 3  Limitation of routine activities due to chronic pain:: No  Alleviating Factors: Rest  Aggravating Factors: Activity  Health Maintenance Reviewed: Due/Overdue   Clinical Pathway: None    Medication Management:  Medication review status: No meds with him today.     Functional Status:  Dependent ADLs:: Independent  Dependent IADLs:: Independent  Bed or wheelchair confined:: No  Mobility Status: Independent  Fallen 2 or more times in the past year?: No  Any fall with injury in the past year?: No    Living Situation:  Current living arrangement:: I live in a private home  Type of residence:: Private home - [facility]    Lifestyle & Psychosocial Needs:    Social Determinants of Health     Tobacco Use: Low Risk      Smoking Tobacco Use: Never Smoker     Smokeless Tobacco Use: Never Used   Alcohol Use: Not At Risk     Frequency of Alcohol Consumption: Never     Average Number of Drinks: Not on file     Frequency of Binge Drinking: Never   Financial Resource Strain: Low Risk      Difficulty of Paying Living Expenses: Not very hard   Food Insecurity: No Food Insecurity     Worried About Running Out of Food in the Last Year: Never true      Ran Out of Food in the Last Year: Never true   Transportation Needs: No Transportation Needs     Lack of Transportation (Medical): No     Lack of Transportation (Non-Medical): No   Physical Activity: Insufficiently Active     Days of Exercise per Week: 5 days     Minutes of Exercise per Session: 10 min   Stress: Stress Concern Present     Feeling of Stress : To some extent   Social Connections: Moderately Integrated     Frequency of Communication with Friends and Family: More than three times a week     Frequency of Social Gatherings with Friends and Family: More than three times a week     Attends Jehovah's witness Services: More than 4 times per year     Active Member of Clubs or Organizations: No     Attends Club or Organization Meetings: 1 to 4 times per year     Marital Status: Never    Intimate Partner Violence: Not At Risk     Fear of Current or Ex-Partner: No     Emotionally Abused: No     Physically Abused: No     Sexually Abused: No   Depression: Not at risk     PHQ-2 Score: 1   Housing Stability: Low Risk      Unable to Pay for Housing in the Last Year: No     Number of Places Lived in the Last Year: 1     Unstable Housing in the Last Year: No     Diet:: Regular  Inadequate nutrition (GOAL):: No  Tube Feeding: No  Inadequate activity/exercise (GOAL):: No  Significant changes in sleep pattern (GOAL): No  Transportation means:: Regular car     Jehovah's witness or spiritual beliefs that impact treatment:: No  Mental health DX:: Yes  Mental health DX how managed:: Medication  Mental health management concern (GOAL):: No  Chemical Dependency Status: No Current Concerns  Informal Support system:: Family      Resources and Interventions:  Current Resources:      Community Resources: None  Supplies Currently Used at Home: None  Equipment Currently Used at Home: none  Employment Status: unemployed     Advance Care Plan/Directive  Advanced Care Plans/Directives on file:: No  Advanced Care Plan/Directive Status:  Declined Further Information    Referrals Placed: None     Goals:      Goals        1. ARM service (pt-stated)       Goal Statement: I want to establish care with an [facility] Worker in the next 90 days so that I may receive support with culturally appropriate home and community-based services.    Date Goal set: 3/10/2022  Barriers: language barrier, lack of knowledge, and low literacy  Strengths: Willing to accept support  Date to Achieve By: 6/10/2022  Patient expressed understanding of goal: Yes    Action steps to achieve this goal:  1. I will answer my phone when I am contacted to schedule my initial diagnostic assessment through [facility].  2. I will follow up with [facility] providers when they contact me to establish services.  3. I will follow up with [facility] regarding this goal.    Note: Referred to [facility] by SABAS. TRI         2. Med teaching (pt-stated)         Goal Statement: I want to learn how to set up my medications correctly by working with the [facility] RN over the next 90 days.    Date goal set: 3/10/2022  Barriers: low literacy, language barrier, and lack of knowledge  Strengths: Agrees to work on goal  Date to Achieve By: 6/10/2022  Patient expressed understanding of goal: Yes    Action steps to achieve this goal  1. I will meet with the CCC RN in the clinic on 3/29/2022 at 1:00pm  2. I will take my medications as prescribed.  3. I will call the CCC RN with concerns or questions.            3. Preventative care (pt-stated)         Goal Statement: I want to attend an Annual Physical exam in the next 60 days so that I may address my current health maintenance care gaps with my PCP.     Date goal set: 3/10/2022  Barriers: language barrier  Strengths: motivated to attend PCP appt  Date to Achieve By: 6/10/2022  Patient expressed understanding of goal: Yes    Action steps to achieve this goal:  1. I will answer my phone when I am contacted to schedule my Annual Physical.  2. I will attend my Annual Physical  appointment at [facility] on /4/2022 at 3:20pm.  3. I will schedule a follow up appointment with my PCP if it is recommended to do so while I am at the clinic.  4. I will follow up with CCC regarding this goal at each outreach until it is completed.                   Outreach Frequency: 6 weeks      Plan:   1) CHW to place [facility] service referral to [facility]  2) CC RN plans to follow up on med refill pending a call back from [facility] program.

## 2022-03-10 NOTE — LETTER
[facility]  Patient Centered Plan of Care  About Me:        Patient Name:  Pablo Russo    YOB: 1980  Age:         41 year old   [employer] MRN:    [id number] Telephone Information:  Home Phone [phone]   Mobile [phone]       Address:  [address] [place] [address] Email address:  No e-mail address on record      Emergency Contact(s)    Name Relationship Lgl Grd Work Phone Home Phone Mobile Phone           Primary language:  Radha     needed? Yes   [employer]:  [phone] op. 1  Other communication barriers:Language barrier; Lack of coping    Preferred Method of Communication:     Current living arrangement: I live in a private home    Mobility Status/ Medical Equipment: Independent        Health Maintenance  Health Maintenance Reviewed: Due/Overdue       My Access Plan  Medical Emergency 911   Primary Clinic Line [facility] [phone]   24 Hour Appointment Line [phone] or  [phone]) (toll-free)   24 Hour Nurse Line [phone] (toll-free)   Preferred Urgent Care [facility] [phone]     Preferred Hospital [facility]  [phone]     Preferred [facility]acy [id number] - [place], [place] - [address]  AT [facility] [address]     Behavioral Health Crisis Line The National Suicide Prevention Lifeline at 1-591.723.9538 or 911             My Care Team Members  Patient Care Team       Relationship Specialty Notifications Start End    Peggy Ortiz MD PCP - General   6/15/20     Phone: [phone] Fax: [phone]         [address] [facility] [address] [place] [address]    Charlene Ovalle, PharmD Pharmacist Pharmacist  5/20/21     Phone: [phone]          [address] [place] [address]    Peggy Ortiz MD Assigned PCP   6/16/21     Phone: [phone] Fax: [phone]         [address] [facility]  [address] [place] [address]    Glenys Lantigua, RN Lead Care Coordinator Primary Care -  Admissions 3/10/22             My Care Plans  Self Management and Treatment Plan  Goals and (Comments)  Goals        General     1. [facility] service (pt-stated)      Notes - Note created  3/16/2022  7:32 AM by Glenys Lantigua, RN     Goal Statement: I want to establish care with an [facility] Worker in the next 90 days so that I may receive support with culturally appropriate home and community-based services.    Date Goal set: 3/10/2022  Barriers: language barrier, lack of knowledge, and low literacy  Strengths: Willing to accept support  Date to Achieve By: 6/10/2022  Patient expressed understanding of goal: Yes    Action steps to achieve this goal:  1. I will answer my phone when I am contacted to schedule my initial diagnostic assessment through [facility].  2. I will follow up with [facility] providers when they contact me to establish services.  3. I will follow up with [facility] regarding this goal.    Note: Referred to [facility] by CHW. TBD       2. Med teaching (pt-stated)      Notes - Note created  3/16/2022  7:34 AM by Glenys Lantigua, RN       Goal Statement: I want to learn how to set up my medications correctly by working with the CCC RN over the next 90 days.    Date goal set: 3/10/2022  Barriers: low literacy, language barrier, and lack of knowledge  Strengths: Agrees to work on goal  Date to Achieve By: 6/10/2022  Patient expressed understanding of goal: Yes    Action steps to achieve this goal  1. I will meet with the CCC RN in the clinic on 3/29/2022 at 1:00pm  2. I will take my medications as prescribed.  3. I will call the CCC RN with concerns or questions.                Action Plans on File:                       Advance Care Plans/Directives Type:   No data recorded    My Medical and Care Information  Problem List   Patient Active Problem List   Diagnosis     Diabetes mellitus, type II (H)     Insomnia     Lump of skin     Severe  major depression without psychotic features (H)     Chronic hepatitis C  (H)     Migraine     Noncompliance with medication regimen     Meibomian gland dysfunction      Current Medications and Allergies:  See printed Medication Report.    Care Coordination Start Date: 3/10/2022   Frequency of Care Coordination: 6 weeks     Form Last Updated: 03/16/2022

## 2022-03-10 NOTE — PROGRESS NOTES
Clinic Care Coordination Contact  [facility]/Voicemail    Clinical Data: Care Coordinator Outreach  Outreach attempted x 1. Patient was no showed for RN [id number], CHW called and left message on patient's voicemail with call back information and requested return call.    Plan: Care Coordinator will try to reach patient again in 1-2 business days.

## 2022-03-11 ENCOUNTER — PATIENT OUTREACH (OUTPATIENT)
Dept: NURSING | Facility: CLINIC | Age: 42
End: 2022-03-11
Payer: MEDICAID

## 2022-03-11 NOTE — PROGRESS NOTES
Clinic Care Coordination Contact    Follow Up Progress Note      Assessment: CC RN spoke with [facility] program - Kari today. Kari will add [facility] as patient's pharmacy as of today and Dr Ortiz as his PCP. CC RN updated patient and [facility] of the change. Patient will  his meds later today and will call CC RN with questions or concerns.       Outreach Frequency: 6 weeks    Plan:   1) Patient will attend his appt with DANA MILLER on 3/29/2022.

## 2022-03-28 DIAGNOSIS — Z76.0 ENCOUNTER FOR MEDICATION REFILL: Primary | ICD-10-CM

## 2022-03-28 RX ORDER — OLOPATADINE HYDROCHLORIDE 1 MG/ML
1 SOLUTION/ DROPS OPHTHALMIC 2 TIMES DAILY
Qty: 5 ML | Refills: 3 | Status: SHIPPED | OUTPATIENT
Start: 2022-03-28 | End: 2022-04-27

## 2022-03-28 NOTE — TELEPHONE ENCOUNTER
Medication refill queued and pended. Prescriber please review, sign, and send if appropriate. Thank you.

## 2022-03-28 NOTE — TELEPHONE ENCOUNTER
Reason for Call:  Medication or medication refill:    Do you use a [facility]?  Name of the pharmacy and phone number for the current request:  rx for eye drops    [facility]    Name of the medication requested: he said he has had them prescribed to him before but I didn't see them on the med list?    Other request:     Can we leave a detailed message on this number? Not Applicable    Phone number patient can be reached at: Home number on file [phone] (home)    Best Time: asap    Call taken on 3/28/2022 at 10:49 AM by Moni Hernandez

## 2022-04-27 ENCOUNTER — PATIENT OUTREACH (OUTPATIENT)
Dept: CARE COORDINATION | Facility: CLINIC | Age: 42
End: 2022-04-27

## 2022-04-27 ENCOUNTER — OFFICE VISIT (OUTPATIENT)
Dept: FAMILY MEDICINE | Facility: CLINIC | Age: 42
End: 2022-04-27
Payer: COMMERCIAL

## 2022-04-27 VITALS
TEMPERATURE: 98.4 F | DIASTOLIC BLOOD PRESSURE: 70 MMHG | BODY MASS INDEX: 24.11 KG/M2 | OXYGEN SATURATION: 98 % | WEIGHT: 131 LBS | HEART RATE: 89 BPM | SYSTOLIC BLOOD PRESSURE: 106 MMHG | HEIGHT: 62 IN

## 2022-04-27 DIAGNOSIS — M25.512 ACUTE PAIN OF LEFT SHOULDER: ICD-10-CM

## 2022-04-27 DIAGNOSIS — F32.2 SEVERE MAJOR DEPRESSION WITHOUT PSYCHOTIC FEATURES (H): ICD-10-CM

## 2022-04-27 DIAGNOSIS — Z91.148 NONCOMPLIANCE WITH MEDICATION REGIMEN: ICD-10-CM

## 2022-04-27 DIAGNOSIS — B18.2 CHRONIC HEPATITIS C WITHOUT HEPATIC COMA (H): Primary | ICD-10-CM

## 2022-04-27 DIAGNOSIS — Z79.4 TYPE 2 DIABETES MELLITUS WITH HYPERGLYCEMIA, WITH LONG-TERM CURRENT USE OF INSULIN (H): ICD-10-CM

## 2022-04-27 DIAGNOSIS — G89.29 CHRONIC LOW BACK PAIN, UNSPECIFIED BACK PAIN LATERALITY, UNSPECIFIED WHETHER SCIATICA PRESENT: ICD-10-CM

## 2022-04-27 DIAGNOSIS — M54.50 CHRONIC LOW BACK PAIN, UNSPECIFIED BACK PAIN LATERALITY, UNSPECIFIED WHETHER SCIATICA PRESENT: ICD-10-CM

## 2022-04-27 DIAGNOSIS — Z76.0 ENCOUNTER FOR MEDICATION REFILL: ICD-10-CM

## 2022-04-27 DIAGNOSIS — E11.65 TYPE 2 DIABETES MELLITUS WITH HYPERGLYCEMIA, WITH LONG-TERM CURRENT USE OF INSULIN (H): ICD-10-CM

## 2022-04-27 DIAGNOSIS — F51.01 PRIMARY INSOMNIA: ICD-10-CM

## 2022-04-27 PROBLEM — F43.21 ADJUSTMENT DISORDER WITH DEPRESSED MOOD: Status: ACTIVE | Noted: 2020-02-18

## 2022-04-27 LAB
ALBUMIN SERPL-MCNC: 4.2 G/DL (ref 3.5–5)
ALP SERPL-CCNC: 81 U/L (ref 45–120)
ALT SERPL W P-5'-P-CCNC: 72 U/L (ref 0–45)
ANION GAP SERPL CALCULATED.3IONS-SCNC: 12 MMOL/L (ref 5–18)
AST SERPL W P-5'-P-CCNC: 67 U/L (ref 0–40)
BILIRUB SERPL-MCNC: 1.1 MG/DL (ref 0–1)
BUN SERPL-MCNC: 19 MG/DL (ref 8–22)
CALCIUM SERPL-MCNC: 9.3 MG/DL (ref 8.5–10.5)
CHLORIDE BLD-SCNC: 93 MMOL/L (ref 98–107)
CO2 SERPL-SCNC: 27 MMOL/L (ref 22–31)
CREAT SERPL-MCNC: 1.45 MG/DL (ref 0.7–1.3)
GFR SERPL CREATININE-BSD FRML MDRD: 62 ML/MIN/1.73M2
GLUCOSE BLD-MCNC: 568 MG/DL (ref 70–125)
HBA1C MFR BLD: 10.7 % (ref 0–5.6)
POTASSIUM BLD-SCNC: 3.9 MMOL/L (ref 3.5–5)
PROT SERPL-MCNC: 7.8 G/DL (ref 6–8)
SODIUM SERPL-SCNC: 132 MMOL/L (ref 136–145)

## 2022-04-27 PROCEDURE — 80053 COMPREHEN METABOLIC PANEL: CPT | Performed by: FAMILY MEDICINE

## 2022-04-27 PROCEDURE — 99215 OFFICE O/P EST HI 40 MIN: CPT | Performed by: FAMILY MEDICINE

## 2022-04-27 PROCEDURE — 36415 COLL VENOUS BLD VENIPUNCTURE: CPT | Performed by: FAMILY MEDICINE

## 2022-04-27 PROCEDURE — 83036 HEMOGLOBIN GLYCOSYLATED A1C: CPT | Performed by: FAMILY MEDICINE

## 2022-04-27 RX ORDER — ATORVASTATIN CALCIUM 20 MG/1
20 TABLET, FILM COATED ORAL DAILY
Qty: 90 TABLET | Refills: 3 | Status: SHIPPED | OUTPATIENT
Start: 2022-04-27 | End: 2022-11-17

## 2022-04-27 RX ORDER — OLOPATADINE HYDROCHLORIDE 1 MG/ML
1 SOLUTION/ DROPS OPHTHALMIC 2 TIMES DAILY
Qty: 5 ML | Refills: 3 | Status: SHIPPED | OUTPATIENT
Start: 2022-04-27 | End: 2022-11-02

## 2022-04-27 RX ORDER — INSULIN GLARGINE 100 [IU]/ML
20 INJECTION, SOLUTION SUBCUTANEOUS AT BEDTIME
Qty: 15 ML | Refills: 3 | Status: SHIPPED | OUTPATIENT
Start: 2022-04-27 | End: 2022-12-28

## 2022-04-27 RX ORDER — QUETIAPINE FUMARATE 50 MG/1
50 TABLET, FILM COATED ORAL AT BEDTIME
Qty: 90 TABLET | Refills: 3 | Status: SHIPPED | OUTPATIENT
Start: 2022-04-27 | End: 2022-07-26

## 2022-04-27 RX ORDER — CYCLOBENZAPRINE HCL 10 MG
10 TABLET ORAL 2 TIMES DAILY PRN
Qty: 30 TABLET | Refills: 1 | Status: SHIPPED | OUTPATIENT
Start: 2022-04-27 | End: 2022-07-28

## 2022-04-27 NOTE — PROGRESS NOTES
ASSESSMENT/PLAN:   Pablo was seen today for follow up.    Diagnoses and all orders for this visit:    Noncompliance with medication regimen  Type 2 diabetes mellitus with hyperglycemia, with long-term current use of insulin (H)  Very confusing if/when he takes his medications or if/when he picks them up. His story is inconsistent - possibly due to cognitive issues or low health literacy or low literacy in general. Glucose will likely be critical high, no further triage needed. Restarting patient at 20 unit(s) lantus. Low threshold to send to ER if he has nausea/vomiting, mental status changes, vision changes.   -     OPTOMETRY REFERRAL; Future  -     HEMOGLOBIN A1C; Future  -     Comprehensive metabolic panel (BMP + Alb, Alk Phos, ALT, AST, Total. Bili, TP); Future  -     insulin pen needle (32G X 4 MM) 32G X 4 MM miscellaneous; Inject 1 each Subcutaneous daily  -     atorvastatin (LIPITOR) 20 MG tablet; Take 1 tablet (20 mg) by mouth daily  -     metFORMIN (GLUCOPHAGE) 1000 MG tablet; Take 1 tablet (1,000 mg) by mouth 2 times daily (with meals)  -     Comprehensive metabolic panel (BMP + Alb, Alk Phos, ALT, AST, Total. Bili, TP)      Chronic hepatitis C  (H)  I do not know if he has ever seen GI, I do not see anything in his chart. Will send referral. Last labs drawn 2021 when he was in ER at [facility] for hyperglycemia.   -     Comprehensive metabolic panel (BMP + Alb, Alk Phos, ALT, AST, Total. Bili, TP); Future  -     Adult Gastro Ref - Consult Only; Future  -     Comprehensive metabolic panel (BMP + Alb, Alk Phos, ALT, AST, Total. Bili, TP)      Chronic low back pain, unspecified back pain laterality, unspecified whether sciatica present  -     cyclobenzaprine (FLEXERIL) 10 MG tablet; Take 1 tablet (10 mg) by mouth 2 times daily as needed for muscle spasms    Primary insomnia  Severe major depression without psychotic features (H)  Not well controlled. Will start quetiapine for sleep. I worry about including  selective serotonin reuptake inhibitor because it is high risk - unclear if/how patient takes his medications. I do not think he has had a formal assessment, unclear when he would be willing to do so - previously did not want to see neuropsych unless he was going to get a citizenship waiver  -     QUEtiapine (SEROQUEL) 50 MG tablet; Take 1 tablet (50 mg) by mouth At Bedtime    Acute pain of left shoulder  -     diclofenac (VOLTAREN) 1 % topical gel; Apply 2 g topically 4 times daily          Return in 13 days (on 5/10/2022) for diabetes follow up, Medication Check.   Recommend he bring in medications with him to this appointment.   Deferred covid vaccine    ======================================================    SUBJECTIVE  Hsa ARLEY Russo is a 41 year old male here for multiple issues.     Would like help applying for government housing  He says no one is helping him with that yet. He did have an [employer] worker with [employer] but he fired them. He says he tried to call [employer] but was unable to reach him. Per care coordination notes he was referred to [facility] recently. He currently lives with his sister but there's too many people. Discussed with SW, theoretically his [employer] worker could help with this.       He would like refills of his medications  He has refills but he says he has not has his insulin since 2021.     Citizenship  He says he would like to pay back whatever he owes. He says there is someone at [facility] helping with this, most likely CCC RN. He has appointment on 5/10/22.       Diabetes. .  Med noncompliance.   He says he has been without his insulin since the end of 2021. See previous notes for more detail. Med reconciliation shows he picked up 30days in 3/11/2022. He said he has not used it since 2021, then he says he used it 2 weeks ago. He also says he only has 2 medicines and needs a refill on eyedrops.     Insomnia/depression  He worries most about his citizenship - see above and previous notes. He is  "unable to sleep because he keeps thinking about it. He did respond well to seroquel a few months ago for sleep, but he says he no longer has any. Will re-send to pharmacy.     ROS  Complete 10 point review of systems negative except as noted above in HPI      OBJECTIVE  /70 (BP Location: Left arm, Patient Position: Sitting, Cuff Size: Adult Regular)   Pulse 89   Temp 98.4  F (36.9  C) (Oral)   Ht 1.575 m (5' 2\")   Wt 59.4 kg (131 lb)   SpO2 98%   BMI 23.96 kg/m       General: Cooperative, pleasant, in no acute distress  HEENT: PERRL, EOMI.  TM normal bilaterally.  Pharynx normal without erythema.  Tonsils normal without hypertrophy or exudates. Dry mucous membranes  Neck: no lymphadenopathy, no masses  CV: RRR, normal S1/S2, no murmur, rubs, gallops  Resp: No respiratory distress. Clear to auscultation bilaterally. No wheezes, rales, rhonchi  Abd: Nontender, nondistended, bowel sounds present  Ext: radial/pedal pulses +2 bilaterally   Diabetic foot exam: normal DP and PT pulses, no trophic changes or ulcerative lesions and normal sensory exam  MSK: Normal muscle tone  Neuro: CN II-XII intact  Skin: warm, well perfused. No rashes  Psych: No suicidal or homicidal ideations, no self-harm.  Normal affect.    LABS & IMAGES   Results for orders placed or performed in visit on 04/27/22   HEMOGLOBIN A1C     Status: Abnormal   Result Value Ref Range    Hemoglobin A1C 10.7 (H) 0.0 - 5.6 %    Narrative    Repeat A1C=10.7         ======================================================  50 minutes spent on the date of the encounter doing chart review, history and exam, documentation and further activities per the note    Visit was completed along with Radha ham    Options for treatment and follow-up care were reviewed with the patient. Hsa M Karan and/or guardian was engaged and actively involved in the decision making process. Hsa M Karan and/or guardian verbalized understanding of the options discussed and was " satisfied with the final plan.      Peggy Ortiz MD

## 2022-05-10 ENCOUNTER — ALLIED HEALTH/NURSE VISIT (OUTPATIENT)
Dept: NURSING | Facility: CLINIC | Age: 42
End: 2022-05-10

## 2022-05-10 ENCOUNTER — OFFICE VISIT (OUTPATIENT)
Dept: FAMILY MEDICINE | Facility: CLINIC | Age: 42
End: 2022-05-10
Payer: COMMERCIAL

## 2022-05-10 VITALS
HEIGHT: 62 IN | OXYGEN SATURATION: 97 % | WEIGHT: 126 LBS | SYSTOLIC BLOOD PRESSURE: 108 MMHG | BODY MASS INDEX: 23.19 KG/M2 | HEART RATE: 90 BPM | TEMPERATURE: 98.2 F | DIASTOLIC BLOOD PRESSURE: 78 MMHG

## 2022-05-10 DIAGNOSIS — R41.3 MEMORY DEFICITS: ICD-10-CM

## 2022-05-10 DIAGNOSIS — F43.21 ADJUSTMENT DISORDER WITH DEPRESSED MOOD: ICD-10-CM

## 2022-05-10 DIAGNOSIS — Z71.89 COMPLEX CARE COORDINATION: Primary | ICD-10-CM

## 2022-05-10 DIAGNOSIS — F32.2 SEVERE MAJOR DEPRESSION WITHOUT PSYCHOTIC FEATURES (H): ICD-10-CM

## 2022-05-10 DIAGNOSIS — E11.65 TYPE 2 DIABETES MELLITUS WITH HYPERGLYCEMIA, WITH LONG-TERM CURRENT USE OF INSULIN (H): Primary | ICD-10-CM

## 2022-05-10 DIAGNOSIS — Z79.4 TYPE 2 DIABETES MELLITUS WITH HYPERGLYCEMIA, WITH LONG-TERM CURRENT USE OF INSULIN (H): Primary | ICD-10-CM

## 2022-05-10 PROCEDURE — 99207 PR NO CHARGE LOS: CPT

## 2022-05-10 PROCEDURE — 99214 OFFICE O/P EST MOD 30 MIN: CPT | Performed by: FAMILY MEDICINE

## 2022-05-10 NOTE — PROGRESS NOTES
Clinic Care Coordination Contact    Follow Up Progress Note      Assessment: follow up on med compliance. Patient showed up an hour late today stating that he got lost driving to the clinic. Forgot to bring all his prescription bottles today. States he was in a hurry. Saw PCP today.     Neuropsychology evaluation  - Patient agreed to neuro-psych eval.  - States very forgetful and at times unable to follow directions.     Care Gaps:    Health Maintenance Due   Topic Date Due     PREVENTIVE CARE VISIT  Never done     ADVANCE CARE PLANNING  Never done     Pneumococcal Vaccine: Pediatrics (0 to 5 Years) and At-Risk Patients (6 to 64 Years) (1 - PCV) Never done     EYE EXAM  07/10/2021     COVID-19 Vaccine (3 - Booster for Pfizer series) 03/18/2022     PHQ-9  05/24/2022 5/31/2022 with PCP    Goals addressed this encounter:    Goals        1. [facility] service (pt-stated)       Goal Statement: I want to establish care with an [facility] Worker in the next 90 days so that I may receive support with culturally appropriate home and community-based services.    Date Goal set: 3/10/2022  Barriers: language barrier, lack of knowledge, and low literacy  Strengths: Willing to accept support  Date to Achieve By: 6/10/2022  Patient expressed understanding of goal: Yes    Action steps to achieve this goal:  1. I will answer my phone when I am contacted to schedule my initial diagnostic assessment through [facility].  2. I will follow up with [facility] providers when they contact me to establish services.  3. I will follow up with [facility] regarding this goal.    Note: Referred to [facility] by CHW. TBD           2. Med teaching (pt-stated)         Goal Statement: I want to learn how to set up my medications correctly by working with the CCC RN over the next 90 days.    Date goal set: 3/10/2022  Barriers: low literacy, language barrier, and lack of knowledge  Strengths: Agrees to work on goal  Date to Achieve By: 6/10/2022  Patient expressed  understanding of goal: Yes    Action steps to achieve this goal  1. I will meet with the CCC RN in the clinic on 3/29/2022 at 1:00pm  2. I will take my medications as prescribed.  3. I will call the CCC RN with concerns or questions.              3. Preventative care (pt-stated)         Goal Statement: I want to attend an Annual Physical exam in the next 60 days so that I may address my current health maintenance care gaps with my PCP.     Date goal set: 3/10/2022  Barriers: language barrier  Strengths: motivated to attend PCP appt  Date to Achieve By: 6/10/2022  Patient expressed understanding of goal: Yes    Action steps to achieve this goal:  1. I will answer my phone when I am contacted to schedule my Annual Physical.  2. I will attend my Annual Physical appointment at [facility] on /4/2022 at 3:20pm.  3. I will schedule a follow up appointment with my PCP if it is recommended to do so while I am at the clinic.  4. I will follow up with [facility] regarding this goal at each outreach until it is completed.                4. Neuro-psych evaluation (pt-stated)       Goal Statement: I want to complete a Neuropsychological assessment in the next 6 months so that I can better understand what supportive services I may want to pursue.    Date Goal set: 5/10/2022  Barriers: language barrier  Strengths: Willing to schedule  Date to Achieve By: 11/10/2022  Patient expressed understanding of goal: Yes    Action steps to achieve this goal:  1. I will answer my phone when I am contacted to schedule my Neuropsychological evaluation.  2. I will attend my Neuropsychological evaluation on TBD.  3. I will follow up with [facility] regarding this goal in the next month.    Note: Referred to [facility] on 5/10/2022.    [facility], [phone], [email or web address]               Outreach Frequency: 6 weeks    Plan:   1) CHW to assist with neuro-psych eval appt with next  outreach call.
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
## 2022-05-26 ENCOUNTER — TELEPHONE (OUTPATIENT)
Dept: FAMILY MEDICINE | Facility: CLINIC | Age: 42
End: 2022-05-26
Payer: COMMERCIAL

## 2022-05-26 NOTE — TELEPHONE ENCOUNTER
DAWN: I would like him to complete the neuropsych evaluation then they can do the [id number] later. He keeps no showing me for in person visits so we weren't able to connect him with resources. I left Samson Kay a message to proceed with neuro-psych eval and they can address the medical waiver later.     Thank you,

## 2022-06-09 ENCOUNTER — PATIENT OUTREACH (OUTPATIENT)
Dept: NURSING | Facility: CLINIC | Age: 42
End: 2022-06-09
Payer: COMMERCIAL

## 2022-06-09 NOTE — PROGRESS NOTES
Clinic Care Coordination Contact  [facility]/Voicemail    Clinical Data: Care Coordinator Outreach  Outreach attempted x 1. CHW called and left message on patient's voicemail with call back information and requested return call.    Plan: will try to reach patient again in two weeks.

## 2022-06-23 ENCOUNTER — PATIENT OUTREACH (OUTPATIENT)
Dept: CARE COORDINATION | Facility: CLINIC | Age: 42
End: 2022-06-23

## 2022-06-23 NOTE — PROGRESS NOTES
Clinic Care Coordination Contact    Community Health Worker Follow Up    Care Gaps:   Health Maintenance Due   Topic Date Due     PREVENTIVE CARE VISIT  Never done     ADVANCE CARE PLANNING  Never done     Pneumococcal Vaccine: Pediatrics (0 to 5 Years) and At-Risk Patients (6 to 64 Years) (1 - PCV) Never done     EYE EXAM  07/10/2021     COVID-19 Vaccine (3 - Booster for Pfizer series) 03/18/2022     PHQ-9  05/24/2022     Scheduled on 8/31/2022 for preventive care visit      Goals:    Goals Addressed as of 6/23/2022 at 10:10 AM                    Today    5/10/22       2. Med teaching (pt-stated)   20%      Added 3/16/22 by Glenys Lantigua, RN      Goal Statement: I want to learn how to set up my medications correctly by working with the [facility] RN over the next 60 days.    Date goal set: 6/23/2022  Barriers: low literacy, language barrier, and lack of knowledge  Strengths: Agrees to work on goal  Date to Achieve By: 8/23/2022  Patient expressed understanding of goal: Yes    Action steps to achieve this goal  1. I will meet with the [facility] RN in the clinic on 7/26/2022 at 8:00 AM.   2. I will take my medications as prescribed daily.  3. I will call the [facility] RN with concerns or questions.                3. Preventative care (pt-stated)   20%      Added 3/16/22 by Glenys Lantigua, RN      Goal Statement: I want to attend an Annual Physical exam in the next 60 days so that I may address my current health maintenance care gaps with my PCP.     Date goal set: 6/23/2022  Barriers: language barrier  Strengths: motivated to attend PCP appt  Date to Achieve By: 8/31/2022  Patient expressed understanding of goal: Yes    Action steps to achieve this goal:  1. I will attend my preventive care visit with PCP as scheduled on 8/31/2022.  2. I will answer my phone when CHW calls to follow up with me.  3. I will attend my follow up appointment with PCP as scheduled on 7/26/2022.                  4. Neuro-psych evaluation (pt-stated)   20%  10%     Added 5/11/22 by Glenys Lantigua RN      Goal Statement: I want to complete a Neuropsychological assessment in the next 6 months so that I can better understand what supportive services I may want to pursue.    Date Goal set: 5/10/2022  Barriers: language barrier  Strengths: Willing to schedule  Date to Achieve By: 11/10/2022  Patient expressed understanding of goal: Yes    Action steps to achieve this goal:  1. I will answer my phone when I am contacted to schedule my Neuropsychological evaluation.  2. I will attend my Neuropsychological evaluation once scheduled.   3. I will follow up with [facility] regarding this goal in the next month.    Note: CHW sent secure email today, 6/23/2022 to follow up on referral status.     [facility], [phone], [email or web address]     Goal update: 6/23/2022          Intervention and Education during outreach:  -CHW updated all patient's goals.  -CHW assisted patient with preventive visit with PCP that patient was no showed.   -CHW sent secure email to [facility] for referral status on neuropsych evaluation for memory loss.  -Patient is working night shift and available for phone call in the AM.   -CHW reminded patient of all upcoming appointments.  -Patient is no longer interested in [facility] services and goal deleted.   -Patient was informed to call with questions or concerns.       CHW Next Outreach: In one month.

## 2022-06-24 ENCOUNTER — PATIENT OUTREACH (OUTPATIENT)
Dept: CARE COORDINATION | Facility: CLINIC | Age: 42
End: 2022-06-24

## 2022-06-24 NOTE — PROGRESS NOTES
Care Coordination Clinician Chart Review     Situation: Patient chart reviewed by care coordinator.?     Background: Initial assessment and enrollment to Care Coordination was 3/10/2022.?? Patient centered goals were developed with participation from patient.? Lead CC handed patient off to CHW for continued outreach every 30 days.??     Assessment: Per chart review, patient outreach completed by CC CHW on 6/23/2022.? Patient is actively working to accomplish goal(s).? Patient's goal(s) remain(s) appropriate at this time.? Patient is due for updated Plan of Care.? Annual assessment will be due 3/10/2023. Patient is scheduled in person MEV with [facility] on 7/26/2022 back to back appt with PCP.       Goals        2. Med teaching (pt-stated)       Goal Statement: I want to learn how to set up my medications correctly by working with the [facility] RN over the next 60 days.    Date goal set: 6/23/2022  Barriers: low literacy, language barrier, and lack of knowledge  Strengths: Agrees to work on goal  Date to Achieve By: 8/23/2022  Patient expressed understanding of goal: Yes    Action steps to achieve this goal  1. I will meet with the [facility] RN in the clinic on 7/26/2022 at 8:00 AM.   2. I will take my medications as prescribed daily.  3. I will call the CCC RN with concerns or questions.                3. Preventative care (pt-stated)       Goal Statement: I want to attend an Annual Physical exam in the next 60 days so that I may address my current health maintenance care gaps with my PCP.     Date goal set: 6/23/2022  Barriers: language barrier  Strengths: motivated to attend PCP appt  Date to Achieve By: 8/31/2022  Patient expressed understanding of goal: Yes    Action steps to achieve this goal:  1. I will attend my preventive care visit with PCP as scheduled on 8/31/2022.  2. I will answer my phone when CHW calls to follow up with me.  3. I will attend my follow up appointment with PCP as scheduled on 7/26/2022.                   4. Neuro-psych evaluation (pt-stated)       Goal Statement: I want to complete a Neuropsychological assessment in the next 6 months so that I can better understand what supportive services I may want to pursue.    Date Goal set: 5/10/2022  Barriers: language barrier  Strengths: Willing to schedule  Date to Achieve By: 11/10/2022  Patient expressed understanding of goal: Yes    Action steps to achieve this goal:  1. I will answer my phone when I am contacted to schedule my Neuropsychological evaluation.  2. I will attend my Neuropsychological evaluation once scheduled.   3. I will follow up with CCC regarding this goal in the next month.    Note: CHW sent secure email today, 6/23/2022 to follow up on referral status.     [facility], [phone], [email or web address]     Goal update: 6/23/2022          ??     Plan/Recommendations: The patient will continue working with Care Coordination to achieve above goal(s).? CHW will involve Lead CC as needed or if patient is ready to move to maintenance.? Lead CC will continue to monitor CHW s monthly outreaches and progress to goal(s) every 6 weeks.?     Plan of Care updated and sent to patient: Yes

## 2022-06-24 NOTE — LETTER
[facility]  Patient Centered Plan of Care  About Me:        Patient Name:  Pablo Russo    YOB: 1980  Age:         41 year old   [place] MRN:    [id number] Telephone Information:  Home Phone [phone]   Mobile [phone]       Address:  [address] [place] [address] [address]  [place] [address] Email address:  No e-mail address on record      Emergency Contact(s)    Name Relationship Lgl Grd Work Phone Home Phone Mobile Phone           Primary language:  Radha     needed? Yes   [place] [facility]:  [phone] op. 1  Other communication barriers:Language barrier; Lack of coping    Preferred Method of Communication:     Current living arrangement: I live in a private home    Mobility Status/ Medical Equipment: Independent        Health Maintenance  Health Maintenance Reviewed: Due/Overdue       My Access Plan  Medical Emergency 911   Primary Clinic Line [facility] [phone]   24 Hour Appointment Line [phone] or  [phone] ([phone]) (toll-free)   24 Hour Nurse Line [phone] (toll-free)   Preferred Urgent Care [facility] [phone]     Preferred Hospital [facility]  [phone]     Preferred Pharmacy [facility] - [place], [place] - [address]     Behavioral Health Crisis Line The National Suicide Prevention Lifeline at 1-934.227.8867 or 911             My Care Team Members  Patient Care Team       Relationship Specialty Notifications Start End    Peggy Ortiz MD PCP - General   6/15/20     Phone: [phone] Fax: [phone]         [address] [place] [address]    Charlene Ovalle, PharmD Pharmacist Pharmacist  5/20/21     Phone: [phone]          [facility] [address] [place] [address]    Peggy Ortiz MD Assigned PCP   6/16/21     Phone: [phone] Fax: [phone]         [address] [place] [address]     Glenys Lantigua, RN Lead Care Coordinator Primary Care - CC Admissions 3/10/22     Dilan Clark Community Health Worker Primary Care - CC Admissions 5/11/22     Phone: [phone] Fax: [phone]         [address] [place] [id number]            My Care Plans  Self Management and Treatment Plan  Goals and (Comments)   Goals        General     2. Med teaching (pt-stated)      Notes - Note edited  6/23/2022 10:01 AM by Dilan Clark     Goal Statement: I want to learn how to set up my medications correctly by working with the [facility] RN over the next 60 days.    Date goal set: 6/23/2022  Barriers: low literacy, language barrier, and lack of knowledge  Strengths: Agrees to work on goal  Date to Achieve By: 8/23/2022  Patient expressed understanding of goal: Yes    Action steps to achieve this goal  1. I will meet with the CCC RN in the clinic on 7/26/2022 at 8:00 AM.   2. I will take my medications as prescribed daily.  3. I will call the CCC RN with concerns or questions.              3. Preventative care (pt-stated)      Notes - Note edited  6/23/2022 10:09 AM by Dilan Clark     Goal Statement: I want to attend an Annual Physical exam in the next 60 days so that I may address my current health maintenance care gaps with my PCP.     Date goal set: 6/23/2022  Barriers: language barrier  Strengths: motivated to attend PCP appt  Date to Achieve By: 8/31/2022  Patient expressed understanding of goal: Yes    Action steps to achieve this goal:  1. I will attend my preventive care visit with PCP as scheduled on 8/31/2022.  2. I will answer my phone when CHW calls to follow up with me.  3. I will attend my follow up appointment with PCP as scheduled on 7/26/2022.                4. Neuro-psych evaluation (pt-stated)      Notes - Note edited  6/23/2022 10:03 AM by Dilan Clark     Goal Statement: I want to complete a Neuropsychological assessment in the next 6 months so that I can better understand what supportive services I may want  to pursue.    Date Goal set: 5/10/2022  Barriers: language barrier  Strengths: Willing to schedule  Date to Achieve By: 11/10/2022  Patient expressed understanding of goal: Yes    Action steps to achieve this goal:  1. I will answer my phone when I am contacted to schedule my Neuropsychological evaluation.  2. I will attend my Neuropsychological evaluation once scheduled.   3. I will follow up with [facility] regarding this goal in the next month.    Note: CHW sent secure email today, 6/23/2022 to follow up on referral status.     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    Care Coordination Start Date: 3/10/2022   Frequency of Care Coordination: 6 weeks     Form Last Updated: 06/24/2022

## 2022-07-14 ENCOUNTER — TELEPHONE (OUTPATIENT)
Dept: NURSING | Facility: CLINIC | Age: 42
End: 2022-07-14

## 2022-07-14 NOTE — TELEPHONE ENCOUNTER
Refill request:     Pt calling with a Radha     Pt is reporting that he is in need of a refill of his Lantus insulin and his Patanol eye drops    Writer called patient's pharmacy at [phone] and they report that patient has refills of both medications that he is calling about today.     Pt was made aware of the above and has no further questions.     Tessie Jose RN  [facility] Nurse Advisor 11:23 AM 7/14/2022

## 2022-07-25 ENCOUNTER — PATIENT OUTREACH (OUTPATIENT)
Dept: CARE COORDINATION | Facility: CLINIC | Age: 42
End: 2022-07-25

## 2022-07-25 NOTE — PROGRESS NOTES
Clinic Care Coordination Contact    Community Health Worker Follow Up    Care Gaps:     Health Maintenance Due   Topic Date Due     PREVENTIVE CARE VISIT  Never done     ADVANCE CARE PLANNING  Never done     Pneumococcal Vaccine: Pediatrics (0 to 5 Years) and At-Risk Patients (6 to 64 Years) (1 - PCV) Never done     EYE EXAM  07/10/2021     COVID-19 Vaccine (3 - Booster for Pfizer series) 03/18/2022     PHQ-9  05/24/2022     A1C  07/27/2022       Scheduled on 8/31/2022.       Goals:    Goals Addressed as of 7/25/2022 at 1:42 PM                    Today    6/23/22       2. Med teaching (pt-stated)   30%  20%    Added 3/16/22 by Glenys Lantigua, RN      Goal Statement: I want to learn how to set up my medications correctly by working with the [facility] RN over the next 60 days.    Date goal set: 6/23/2022  Barriers: low literacy, language barrier, and lack of knowledge  Strengths: Agrees to work on goal  Date to Achieve By: 8/23/2022  Patient expressed understanding of goal: Yes    Action steps to achieve this goal  1. I will meet with the [facility] RN in the clinic on 7/26/2022 at 8:00 AM.   2. I will take my medications as prescribed daily.  3. I will call the [facility] RN with concerns or questions.     Goal update: 7/25/2022             3. Preventative care (pt-stated)   30%  20%    Added 3/16/22 by Glenys Lantigua, RN      Goal Statement: I want to attend an Annual Physical exam in the next 60 days so that I may address my current health maintenance care gaps with my PCP.     Date goal set: 6/23/2022  Barriers: language barrier  Strengths: motivated to attend PCP appt  Date to Achieve By: 8/31/2022  Patient expressed understanding of goal: Yes    Action steps to achieve this goal:  1. I will attend my preventive care visit with PCP as scheduled on 8/31/2022.  2. I will answer my phone when CHW calls to follow up with me.  3. I will attend my follow up appointment with PCP as scheduled on 7/26/2022.       Goal update: 7/25/2022              4. Neuro-psych evaluation (pt-stated)   30%  20%    Added 5/11/22 by Glenys Lantigua, RN      Goal Statement: I want to complete a Neuropsychological assessment in the next 6 months so that I can better understand what supportive services I may want to pursue.    Date Goal set: 5/10/2022  Barriers: language barrier  Strengths: Willing to schedule  Date to Achieve By: 11/10/2022  Patient expressed understanding of goal: Yes    Action steps to achieve this goal:  1. I will attend my appointment for psych evaluation as scheduled at [facility] on 9/15/2022 at 9:30 AM.   2. I will update [facility] team regarding this goal.     Note: Patient is scheduled for evaluation at [facility] on 9/15/2022 at 9:30 AM.      [facility], [phone], [email or web address]     Goal update: 7/25/2022          Intervention and Education during outreach:  -Patient was reminded of upcoming appointments and reminded to bring all medications for review and med teaching.      CHW Next Outreach: In one month.

## 2022-07-26 ENCOUNTER — OFFICE VISIT (OUTPATIENT)
Dept: FAMILY MEDICINE | Facility: CLINIC | Age: 42
End: 2022-07-26
Payer: COMMERCIAL

## 2022-07-26 ENCOUNTER — ALLIED HEALTH/NURSE VISIT (OUTPATIENT)
Dept: NURSING | Facility: CLINIC | Age: 42
End: 2022-07-26

## 2022-07-26 VITALS
SYSTOLIC BLOOD PRESSURE: 110 MMHG | DIASTOLIC BLOOD PRESSURE: 68 MMHG | TEMPERATURE: 98 F | OXYGEN SATURATION: 98 % | WEIGHT: 131.25 LBS | HEART RATE: 80 BPM | BODY MASS INDEX: 24.01 KG/M2

## 2022-07-26 DIAGNOSIS — F51.01 PRIMARY INSOMNIA: ICD-10-CM

## 2022-07-26 DIAGNOSIS — Z71.89 COMPLEX CARE COORDINATION: Primary | ICD-10-CM

## 2022-07-26 DIAGNOSIS — Z23 HIGH PRIORITY FOR 2019-NCOV VACCINE: ICD-10-CM

## 2022-07-26 DIAGNOSIS — E11.65 TYPE 2 DIABETES MELLITUS WITH HYPERGLYCEMIA, WITH LONG-TERM CURRENT USE OF INSULIN (H): Primary | ICD-10-CM

## 2022-07-26 DIAGNOSIS — K29.20 ACUTE ALCOHOLIC GASTRITIS WITHOUT HEMORRHAGE: ICD-10-CM

## 2022-07-26 DIAGNOSIS — Z79.4 TYPE 2 DIABETES MELLITUS WITH HYPERGLYCEMIA, WITH LONG-TERM CURRENT USE OF INSULIN (H): Primary | ICD-10-CM

## 2022-07-26 DIAGNOSIS — B18.2 CHRONIC HEPATITIS C WITHOUT HEPATIC COMA (H): ICD-10-CM

## 2022-07-26 DIAGNOSIS — Z23 IMMUNIZATION DUE: ICD-10-CM

## 2022-07-26 LAB
ALBUMIN SERPL BCG-MCNC: 4.2 G/DL (ref 3.5–5.2)
ALP SERPL-CCNC: 56 U/L (ref 40–129)
ALT SERPL W P-5'-P-CCNC: 48 U/L (ref 10–50)
ANION GAP SERPL CALCULATED.3IONS-SCNC: 8 MMOL/L (ref 7–15)
AST SERPL W P-5'-P-CCNC: 43 U/L (ref 10–50)
BILIRUB SERPL-MCNC: 0.9 MG/DL
BUN SERPL-MCNC: 12.6 MG/DL (ref 6–20)
CALCIUM SERPL-MCNC: 9.2 MG/DL (ref 8.6–10)
CHLORIDE SERPL-SCNC: 100 MMOL/L (ref 98–107)
CREAT SERPL-MCNC: 0.86 MG/DL (ref 0.67–1.17)
DEPRECATED HCO3 PLAS-SCNC: 29 MMOL/L (ref 22–29)
ERYTHROCYTE [DISTWIDTH] IN BLOOD BY AUTOMATED COUNT: 12.8 % (ref 10–15)
GFR SERPL CREATININE-BSD FRML MDRD: >90 ML/MIN/1.73M2
GLUCOSE SERPL-MCNC: 165 MG/DL (ref 70–99)
HBA1C MFR BLD: 7.2 % (ref 0–5.6)
HCT VFR BLD AUTO: 59.5 % (ref 40–53)
HGB BLD-MCNC: 19.9 G/DL (ref 13.3–17.7)
MCH RBC QN AUTO: 32.3 PG (ref 26.5–33)
MCHC RBC AUTO-ENTMCNC: 33.4 G/DL (ref 31.5–36.5)
MCV RBC AUTO: 96 FL (ref 78–100)
PLATELET # BLD AUTO: 102 10E3/UL (ref 150–450)
POTASSIUM SERPL-SCNC: 3.9 MMOL/L (ref 3.4–5.3)
PROT SERPL-MCNC: 7.2 G/DL (ref 6.4–8.3)
RBC # BLD AUTO: 6.17 10E6/UL (ref 4.4–5.9)
SODIUM SERPL-SCNC: 137 MMOL/L (ref 136–145)
WBC # BLD AUTO: 4.6 10E3/UL (ref 4–11)

## 2022-07-26 PROCEDURE — 99215 OFFICE O/P EST HI 40 MIN: CPT | Mod: 25 | Performed by: FAMILY MEDICINE

## 2022-07-26 PROCEDURE — 83036 HEMOGLOBIN GLYCOSYLATED A1C: CPT | Performed by: FAMILY MEDICINE

## 2022-07-26 PROCEDURE — 99207 PR NO CHARGE LOS: CPT

## 2022-07-26 PROCEDURE — 80053 COMPREHEN METABOLIC PANEL: CPT | Performed by: FAMILY MEDICINE

## 2022-07-26 PROCEDURE — 85027 COMPLETE CBC AUTOMATED: CPT | Performed by: FAMILY MEDICINE

## 2022-07-26 PROCEDURE — 36415 COLL VENOUS BLD VENIPUNCTURE: CPT | Performed by: FAMILY MEDICINE

## 2022-07-26 PROCEDURE — 0054A COVID-19,PF,PFIZER (12+ YRS): CPT | Performed by: FAMILY MEDICINE

## 2022-07-26 PROCEDURE — 91305 COVID-19,PF,PFIZER (12+ YRS): CPT | Performed by: FAMILY MEDICINE

## 2022-07-26 RX ORDER — QUETIAPINE FUMARATE 100 MG/1
100 TABLET, FILM COATED ORAL AT BEDTIME
Qty: 30 TABLET | Refills: 3 | Status: SHIPPED | OUTPATIENT
Start: 2022-07-26 | End: 2022-11-10

## 2022-07-26 RX ORDER — MIRTAZAPINE 7.5 MG/1
7.5 TABLET, FILM COATED ORAL AT BEDTIME
Qty: 30 TABLET | Refills: 3 | Status: SHIPPED | OUTPATIENT
Start: 2022-07-26 | End: 2022-11-15

## 2022-07-26 RX ORDER — ONDANSETRON 4 MG/1
4 TABLET, ORALLY DISINTEGRATING ORAL EVERY 8 HOURS PRN
Qty: 60 TABLET | Refills: 0 | Status: SHIPPED | OUTPATIENT
Start: 2022-07-26 | End: 2022-12-28

## 2022-07-26 ASSESSMENT — PATIENT HEALTH QUESTIONNAIRE - PHQ9
SUM OF ALL RESPONSES TO PHQ QUESTIONS 1-9: 10
10. IF YOU CHECKED OFF ANY PROBLEMS, HOW DIFFICULT HAVE THESE PROBLEMS MADE IT FOR YOU TO DO YOUR WORK, TAKE CARE OF THINGS AT HOME, OR GET ALONG WITH OTHER PEOPLE: VERY DIFFICULT
SUM OF ALL RESPONSES TO PHQ QUESTIONS 1-9: 10

## 2022-07-26 NOTE — PROGRESS NOTES
Assessment & Plan     Type 2 diabetes mellitus with hyperglycemia, with long-term current use of insulin (H)  Significant improvement with A1C 7.2, no symptomatic lows or highs.   - Hemoglobin A1c  - Comprehensive metabolic panel (BMP + Alb, Alk Phos, ALT, AST, Total. Bili, TP)  - Comprehensive metabolic panel (BMP + Alb, Alk Phos, ALT, AST, Total. Bili, TP)  - Adult Eye Referral    Chronic hepatitis C  (H)  Diagnosed at [facility], has not seen GI. Patient has been difficult to reach.   - Comprehensive metabolic panel (BMP + Alb, Alk Phos, ALT, AST, Total. Bili, TP)  - Comprehensive metabolic panel (BMP + Alb, Alk Phos, ALT, AST, Total. Bili, TP)    Immunization due  Deferred pneumonia vaccine. Wants to get covid vaccine today.     Primary insomnia  He has been drinking to sleep, see below. Taking two seroquel not helping. He worries a lot about his citizenship. See below. Will increase quetiapine, add mirtazapine. Follow up in 1-2 months.   - QUEtiapine (SEROQUEL) 100 MG tablet  Dispense: 30 tablet; Refill: 3  - mirtazapine (REMERON) 7.5 MG tablet  Dispense: 30 tablet; Refill: 3    Acute alcoholic gastritis without hemorrhage  Patient was drinking a small bottle of alcohol to sleep, stopped 2 weeks ago. He has abdominal pain, no blood in stool, no melena. Will treat with PPI, check labs.   - omeprazole (PRILOSEC) 20 MG DR capsule  Dispense: 90 capsule; Refill: 3  - ondansetron (ZOFRAN ODT) 4 MG ODT tab  Dispense: 60 tablet; Refill: 0  - CBC with platelets    High priority for 2019-nCoV vaccine  - COVID-19,PF,PFIZER (12+ Yrs GRAY LABEL)      Return in about 1 month (around 8/26/2022) for Medication Check.    50 minutes spent on the date of the encounter doing chart review, history and exam, documentation and further activities per the note    Visit was completed along with Radha     Options for treatment and follow-up care were reviewed with the patient. Hsa ARLEY Karan and/or guardian was engaged and  actively involved in the decision making process. Hsa ARLEY Karan and/or guardian verbalized understanding of the options discussed and was satisfied with the final plan.    Peggy Ortiz MD  [facility] is a 41 year old, presenting for the following health issues:  Recheck Medication, Nausea, Sleep Problem, and Imm/Inj (COVID-19 VACCINE)    PATIENT IS AWARE AND TOLD MULTIPLE TIMES WHAT HE NEEDS TO DO FOR CITIZENSHIP. I CANNOT GIVE HIM A WAIVER, HE WILL NOT QUALIFY FOR CITIZENSHIP UNTIL HIS LEGAL ISSUES HAVE BEEN RESOLVED.  He has no showed to multiple care coordination. He works 6 days per week.     History of Present Illness       Diabetes:   He presents for follow up of diabetes.  He is not checking blood glucose. He has no concerns regarding his diabetes at this time.  He is not experiencing numbness or burning in feet, excessive thirst, blurry vision, weight changes or redness, sores or blisters on feet. The patient has not had a diabetic eye exam in the last 12 months.         He eats 0-1 servings of fruits and vegetables daily.He consumes 0 sweetened beverage(s) daily.He exercises with enough effort to increase his heart rate 9 or less minutes per day.  He exercises with enough effort to increase his heart rate 3 or less days per week.   He is taking medications regularly.    Today's PHQ-9         PHQ-9 Total Score: 10    PHQ-9 Q9 Thoughts of better off dead/self-harm past 2 weeks :   Not at all    How difficult have these problems made it for you to do your work, take care of things at home, or get along with other people: Very difficult       Patient has issues taking his medications, remembering to take his medications. Has been ex    He drinks a lot of alcohol because he is unable to sleep. He drinks one bottle of alcohol every day because he cannot sleep. He stopped 2 weeks ago because of abdominal pain. He said two of he sleeping pills did not help so he started  drinking. He is notoriously nonadherent to medications, and I think the only thing he has now is quetiapine.     Best time to call is between 12-1pm because otherwise he is sleeping.     Review of Systems   Constitutional, HEENT, cardiovascular, pulmonary, gi and gu systems are negative, except as otherwise noted.      Objective    /68   Pulse 80   Temp 98  F (36.7  C)   Wt 59.5 kg (131 lb 4 oz)   SpO2 98%   BMI 24.01 kg/m    Body mass index is 24.01 kg/m .  Physical Exam   GENERAL: healthy, alert and no distress  EYES: Eyes grossly normal to inspection  RESP: lungs clear to auscultation - no rales, rhonchi or wheezes  CV: regular rate and rhythm, normal S1 S2, no S3 or S4, no murmur, click or rub, no peripheral edema and peripheral pulses strong  ABDOMEN: tender in epigastric region.   MS: no gross musculoskeletal defects noted, no edema  SKIN: no suspicious lesions or rashes  NEURO: Normal strength and tone, mentation intact and speech normal  PSYCH: mentation appears normal, affect normal/bright    Results for orders placed or performed in visit on 07/26/22 (from the past 24 hour(s))   Hemoglobin A1c   Result Value Ref Range    Hemoglobin A1C 7.2 (H) 0.0 - 5.6 %   CBC with platelets   Result Value Ref Range    WBC Count 4.6 4.0 - 11.0 10e3/uL    RBC Count 6.17 (H) 4.40 - 5.90 10e6/uL    Hemoglobin 19.9 (H) 13.3 - 17.7 g/dL    Hematocrit 59.5 (H) 40.0 - 53.0 %    MCV 96 78 - 100 fL    MCH 32.3 26.5 - 33.0 pg    MCHC 33.4 31.5 - 36.5 g/dL    RDW 12.8 10.0 - 15.0 %    Platelet Count 102 (L) 150 - 450 10e3/uL                   .  ..

## 2022-07-26 NOTE — PROGRESS NOTES
Clinic Care Coordination Medication Education Follow - Up Visit    Patient presents for:  Medication education, Pill box teaching, Compliance monitoring and Medication reconciliation    Language: Radha  : Yes    Communication: Literate in other languages    Accompanied by:      Patient Living Situation:      Primary Care Provider:  Peggy Ortiz    Barriers:  Language, Cultural, Poor insight into disease process, Inadequate support at home, Non-compliance of medications, Multiple uncontrolled disease states, Chronic persistent mental illness and Memory deficits    Medication List (see cited below): Patient presents with all ordered medications    Current Outpatient Medications   Medication Sig     Alcohol Swabs (ALCOHOL WIPES) 70 % PADS Use to wipe medication vial before putting needle through. Use a second to clean off injection side prior to injection.     atorvastatin (LIPITOR) 20 MG tablet Take 1 tablet (20 mg) by mouth daily     blood-glucose meter Misc [BLOOD-GLUCOSE METER MISC] Use 1 application As Directed daily for 1 day.     cyclobenzaprine (FLEXERIL) 10 MG tablet Take 1 tablet (10 mg) by mouth 2 times daily as needed for muscle spasms     diclofenac (VOLTAREN) 1 % topical gel Apply 2 g topically 4 times daily     insulin glargine (LANTUS SOLOSTAR) 100 UNIT/ML pen Inject 20 Units Subcutaneous At Bedtime     insulin pen needle (32G X 4 MM) 32G X 4 MM miscellaneous Inject 1 each Subcutaneous daily     mirtazapine (REMERON) 7.5 MG tablet Take 1 tablet (7.5 mg) by mouth At Bedtime     olopatadine (PATANOL) 0.1 % ophthalmic solution Place 1 drop into both eyes 2 times daily     omeprazole (PRILOSEC) 20 MG DR capsule Take 1 capsule (20 mg) by mouth daily     ondansetron (ZOFRAN ODT) 4 MG ODT tab Take 1 tablet (4 mg) by mouth every 8 hours as needed for nausea     QUEtiapine (SEROQUEL) 100 MG tablet Take 1 tablet (100 mg) by mouth At Bedtime     No current facility-administered medications for this  visit.       Compliance: unsure - states he's taking his meds as prescribed.     Future Appointments   Date Time Provider Department Center   8/31/2022  7:40 AM Peggy Ortiz MD [facility]       Action Plan  RN Will:      Nursing Notes:  Patient met with CCRN in clinic today after his appt with PCP. Patient didn't bring any of his prescriptions bottle today stating that he forgot. Declined further assist with med teaching stating that he knows how to take his meds correctly. Much improved a1c today of 7.2 from 10.7. Instructed patient to  [facility]. Patient inquired about his citizenship status again today. CCRN again educated patient that per chart review, they can't not apply for citizenship until his legal issues are resolved and he needs to referral to [facility] for assistance. Patient agreed to the plan. CCRN connected patient to [employer] today to assist him. He would prefer to be called around 10:30am -1pm. He goest to work at 10pm-10am.     Declined to scheduled GI appt today but maybe open to it in the near future.

## 2022-07-28 ENCOUNTER — TELEPHONE (OUTPATIENT)
Dept: FAMILY MEDICINE | Facility: CLINIC | Age: 42
End: 2022-07-28

## 2022-07-28 ENCOUNTER — NURSE TRIAGE (OUTPATIENT)
Dept: NURSING | Facility: CLINIC | Age: 42
End: 2022-07-28

## 2022-07-28 DIAGNOSIS — G89.29 CHRONIC LOW BACK PAIN, UNSPECIFIED BACK PAIN LATERALITY, UNSPECIFIED WHETHER SCIATICA PRESENT: ICD-10-CM

## 2022-07-28 DIAGNOSIS — Z76.0 ENCOUNTER FOR MEDICATION REFILL: Primary | ICD-10-CM

## 2022-07-28 DIAGNOSIS — M54.50 CHRONIC LOW BACK PAIN, UNSPECIFIED BACK PAIN LATERALITY, UNSPECIFIED WHETHER SCIATICA PRESENT: ICD-10-CM

## 2022-07-28 RX ORDER — CYCLOBENZAPRINE HCL 10 MG
10 TABLET ORAL 2 TIMES DAILY PRN
Qty: 30 TABLET | Refills: 4 | Status: SHIPPED | OUTPATIENT
Start: 2022-07-28 | End: 2022-12-28

## 2022-07-28 NOTE — TELEPHONE ENCOUNTER
Covid screening completed     assisted call    Asking for refill of low back pain medication. Out of medication    Flexeril refill request.  [facility]    Elizabeth Beebe RN  [facility] Nurse Advisor

## 2022-08-15 ENCOUNTER — OFFICE VISIT (OUTPATIENT)
Dept: URGENT CARE | Facility: URGENT CARE | Age: 42
End: 2022-08-15
Payer: COMMERCIAL

## 2022-08-15 VITALS
TEMPERATURE: 98.4 F | WEIGHT: 130 LBS | BODY MASS INDEX: 23.92 KG/M2 | SYSTOLIC BLOOD PRESSURE: 112 MMHG | HEART RATE: 74 BPM | OXYGEN SATURATION: 98 % | RESPIRATION RATE: 15 BRPM | DIASTOLIC BLOOD PRESSURE: 74 MMHG | HEIGHT: 62 IN

## 2022-08-15 DIAGNOSIS — Z79.4 TYPE 2 DIABETES MELLITUS WITH HYPERGLYCEMIA, WITH LONG-TERM CURRENT USE OF INSULIN (H): ICD-10-CM

## 2022-08-15 DIAGNOSIS — E11.65 TYPE 2 DIABETES MELLITUS WITH HYPERGLYCEMIA, WITH LONG-TERM CURRENT USE OF INSULIN (H): ICD-10-CM

## 2022-08-15 DIAGNOSIS — M54.50 BACK PAIN, LUMBOSACRAL: Primary | ICD-10-CM

## 2022-08-15 PROCEDURE — 99213 OFFICE O/P EST LOW 20 MIN: CPT | Performed by: PHYSICIAN ASSISTANT

## 2022-08-15 RX ORDER — CELECOXIB 200 MG/1
200 CAPSULE ORAL DAILY
Qty: 25 CAPSULE | Refills: 0 | Status: SHIPPED | OUTPATIENT
Start: 2022-08-15 | End: 2022-11-17

## 2022-08-15 ASSESSMENT — ENCOUNTER SYMPTOMS
VOMITING: 0
COUGH: 0
FATIGUE: 0
JOINT SWELLING: 0
SORE THROAT: 0
MYALGIAS: 1
ABDOMINAL PAIN: 0
HEADACHES: 0
RHINORRHEA: 0
DIARRHEA: 0
NAUSEA: 0
CHILLS: 1
DYSURIA: 0
APPETITE CHANGE: 0
DIAPHORESIS: 0
SHORTNESS OF BREATH: 0
FEVER: 0

## 2022-08-15 NOTE — PROGRESS NOTES
SUBJECTIVE:   Pablo Russo is a 41 year old male presenting with a chief complaint of   Chief Complaint   Patient presents with     Urgent Care     Chills     Chills x 4 days. Also back hurts.        He is an established patient of [facility].  Patient presents with complaints of chills and muscle aches x 2-3 days.  Hx obtained through .  Bilateral lower back pain.  No dysuria.  No radiation of pain.         Las A1C - 7.2 on 7/26.    Treatment:  nothing    Review of Systems   Constitutional: Positive for chills. Negative for appetite change, diaphoresis, fatigue and fever.   HENT: Negative for congestion, ear pain, rhinorrhea and sore throat.    Respiratory: Negative for cough and shortness of breath.    Cardiovascular: Negative for chest pain.   Gastrointestinal: Negative for abdominal pain, diarrhea, nausea and vomiting.   Genitourinary: Negative for dysuria.   Musculoskeletal: Positive for myalgias. Negative for joint swelling.   Skin: Negative for rash.   Neurological: Negative for headaches.   All other systems reviewed and are negative.      Past Medical History:   Diagnosis Date     Immune to hepatitis B 2/12/2020 2/11/2020 - Immune from prior infection     Immune to hepatitis B 2/12/2020 2/11/2020 - Immune from prior infection     No family history on file.  Current Outpatient Medications   Medication Sig Dispense Refill     insulin glargine (LANTUS SOLOSTAR) 100 UNIT/ML pen Inject 20 Units Subcutaneous At Bedtime 15 mL 3     Alcohol Swabs (ALCOHOL WIPES) 70 % PADS Use to wipe medication vial before putting needle through. Use a second to clean off injection side prior to injection.       atorvastatin (LIPITOR) 20 MG tablet Take 1 tablet (20 mg) by mouth daily 90 tablet 3     blood-glucose meter Misc [BLOOD-GLUCOSE METER MISC] Use 1 application As Directed daily for 1 day. 1 each 0     cyclobenzaprine (FLEXERIL) 10 MG tablet Take 1 tablet (10 mg) by mouth 2 times daily as needed for muscle spasms  "30 tablet 4     diclofenac (VOLTAREN) 1 % topical gel Apply 2 g topically 4 times daily 100 g 0     insulin pen needle (32G X 4 MM) 32G X 4 MM miscellaneous Inject 1 each Subcutaneous daily 100 each 3     mirtazapine (REMERON) 7.5 MG tablet Take 1 tablet (7.5 mg) by mouth At Bedtime 30 tablet 3     olopatadine (PATANOL) 0.1 % ophthalmic solution Place 1 drop into both eyes 2 times daily 5 mL 3     omeprazole (PRILOSEC) 20 MG DR capsule Take 1 capsule (20 mg) by mouth daily 90 capsule 3     ondansetron (ZOFRAN ODT) 4 MG ODT tab Take 1 tablet (4 mg) by mouth every 8 hours as needed for nausea 60 tablet 0     QUEtiapine (SEROQUEL) 100 MG tablet Take 1 tablet (100 mg) by mouth At Bedtime 30 tablet 3     Social History     Tobacco Use     Smoking status: Never Smoker     Smokeless tobacco: Never Used   Substance Use Topics     Alcohol use: Yes       OBJECTIVE  /74   Pulse 74   Temp 98.4  F (36.9  C) (Temporal)   Resp 15   Ht 1.575 m (5' 2\")   Wt 59 kg (130 lb)   SpO2 98%   BMI 23.78 kg/m      Physical Exam  Vitals and nursing note reviewed.   Constitutional:       Appearance: Normal appearance. He is normal weight.   Eyes:      Extraocular Movements: Extraocular movements intact.      Conjunctiva/sclera: Conjunctivae normal.   Cardiovascular:      Rate and Rhythm: Normal rate and regular rhythm.      Pulses: Normal pulses.      Heart sounds: Normal heart sounds.   Pulmonary:      Effort: Pulmonary effort is normal.      Breath sounds: Normal breath sounds.   Abdominal:      Tenderness: There is no right CVA tenderness or left CVA tenderness.   Musculoskeletal:         General: No tenderness. Normal range of motion.   Skin:     General: Skin is warm and dry.   Neurological:      Mental Status: He is alert.   Psychiatric:         Mood and Affect: Mood normal.         Labs:  No results found for this or any previous visit (from the past 24 hour(s)).    X-Ray was not done.    ASSESSMENT:      ICD-10-CM    1. " Type 2 diabetes mellitus with hyperglycemia, with long-term current use of insulin (H)  E11.65     Z79.4         Medical Decision Making:    Differential Diagnosis:  MS Injury Pain: muscle strain    Serious Comorbid Conditions:  Adult:  reviewed    PLAN:    Rx for celebrex.  May continue with tylenol.  Drink plenty of water.  Discussed reasons to seek immediate medical attention.  Additionally if no improvement or worsening in one week, may follow up with PCP and/or UC.        Followup:    If not improving or if condition worsens, follow up with your Primary Care Provider, If not improving or if conditions worsens over the next 12-24 hours, go to the Emergency Department    There are no Patient Instructions on file for this visit.

## 2022-08-25 ENCOUNTER — PATIENT OUTREACH (OUTPATIENT)
Dept: CARE COORDINATION | Facility: CLINIC | Age: 42
End: 2022-08-25

## 2022-08-25 NOTE — PROGRESS NOTES
Clinic Care Coordination - Chart Review Only    Situation/Background: Patient chart reviewed by care coordinator related to [employer] [facility] conversion.    Assessment: Patient continues to be followed by Clinic Care Coordination.    Plan: Patient's chart updated to align with [employer] [facility] program for ongoing patient management.

## 2022-08-31 ENCOUNTER — OFFICE VISIT (OUTPATIENT)
Dept: FAMILY MEDICINE | Facility: CLINIC | Age: 42
End: 2022-08-31
Payer: COMMERCIAL

## 2022-08-31 ENCOUNTER — PATIENT OUTREACH (OUTPATIENT)
Dept: NURSING | Facility: CLINIC | Age: 42
End: 2022-08-31

## 2022-08-31 VITALS
DIASTOLIC BLOOD PRESSURE: 68 MMHG | SYSTOLIC BLOOD PRESSURE: 100 MMHG | WEIGHT: 130 LBS | OXYGEN SATURATION: 99 % | BODY MASS INDEX: 23.92 KG/M2 | HEART RATE: 91 BPM | RESPIRATION RATE: 16 BRPM | HEIGHT: 62 IN | TEMPERATURE: 97.9 F

## 2022-08-31 DIAGNOSIS — E11.65 TYPE 2 DIABETES MELLITUS WITH HYPERGLYCEMIA, WITH LONG-TERM CURRENT USE OF INSULIN (H): ICD-10-CM

## 2022-08-31 DIAGNOSIS — B18.2 CHRONIC HEPATITIS C WITHOUT HEPATIC COMA (H): ICD-10-CM

## 2022-08-31 DIAGNOSIS — Z00.00 ROUTINE GENERAL MEDICAL EXAMINATION AT A HEALTH CARE FACILITY: Primary | ICD-10-CM

## 2022-08-31 DIAGNOSIS — Z91.148 NONCOMPLIANCE WITH MEDICATION REGIMEN: ICD-10-CM

## 2022-08-31 DIAGNOSIS — F32.2 SEVERE MAJOR DEPRESSION WITHOUT PSYCHOTIC FEATURES (H): ICD-10-CM

## 2022-08-31 DIAGNOSIS — F43.21 ADJUSTMENT DISORDER WITH DEPRESSED MOOD: ICD-10-CM

## 2022-08-31 DIAGNOSIS — Z79.4 TYPE 2 DIABETES MELLITUS WITH HYPERGLYCEMIA, WITH LONG-TERM CURRENT USE OF INSULIN (H): ICD-10-CM

## 2022-08-31 LAB — GLUCOSE BLD-MCNC: 217 MG/DL (ref 60–99)

## 2022-08-31 PROCEDURE — 82947 ASSAY GLUCOSE BLOOD QUANT: CPT | Performed by: FAMILY MEDICINE

## 2022-08-31 PROCEDURE — 36415 COLL VENOUS BLD VENIPUNCTURE: CPT | Performed by: FAMILY MEDICINE

## 2022-08-31 PROCEDURE — 99396 PREV VISIT EST AGE 40-64: CPT | Performed by: FAMILY MEDICINE

## 2022-08-31 ASSESSMENT — ENCOUNTER SYMPTOMS
DIZZINESS: 0
FEVER: 0
NERVOUS/ANXIOUS: 0
WEAKNESS: 0
FREQUENCY: 0
SHORTNESS OF BREATH: 0
SORE THROAT: 0
ARTHRALGIAS: 0
JOINT SWELLING: 0
ABDOMINAL PAIN: 0
HEADACHES: 0
HEARTBURN: 0
PALPITATIONS: 0
MYALGIAS: 0
HEMATURIA: 0
PARESTHESIAS: 0
COUGH: 0
HEMATOCHEZIA: 0
DIARRHEA: 0
EYE PAIN: 0
CHILLS: 0
DYSURIA: 0
NAUSEA: 0
CONSTIPATION: 0

## 2022-08-31 ASSESSMENT — PATIENT HEALTH QUESTIONNAIRE - PHQ9
SUM OF ALL RESPONSES TO PHQ QUESTIONS 1-9: 1
10. IF YOU CHECKED OFF ANY PROBLEMS, HOW DIFFICULT HAVE THESE PROBLEMS MADE IT FOR YOU TO DO YOUR WORK, TAKE CARE OF THINGS AT HOME, OR GET ALONG WITH OTHER PEOPLE: NOT DIFFICULT AT ALL
SUM OF ALL RESPONSES TO PHQ QUESTIONS 1-9: 1

## 2022-08-31 NOTE — PROGRESS NOTES
SUBJECTIVE:   CC: Pablo Russo is an 41 year old male who presents for preventative health visit.       Patient has been advised of split billing requirements and indicates understanding: Yes  Healthy Habits:     Getting at least 3 servings of Calcium per day:  NO    Bi-annual eye exam:  Yes    Dental care twice a year:  NO    Sleep apnea or symptoms of sleep apnea:  None    Diet:  Regular (no restrictions)    Frequency of exercise:  None    Taking medications regularly:  Yes    Medication side effects:  None    PHQ-2 Total Score: 0    Additional concerns today:  No        Today's PHQ-2 Score:   PHQ-2 ( 1999 [employer]) 8/31/2022   Q1: Little interest or pleasure in doing things 0   Q2: Feeling down, depressed or hopeless 0   PHQ-2 Score 0   Q1: Little interest or pleasure in doing things Not at all   Q2: Feeling down, depressed or hopeless Not at all   PHQ-2 Score 0       Abuse: Current or Past(Physical, Sexual or Emotional)- No  Do you feel safe in your environment? Yes    Have you ever done Advance Care Planning? (For example, a Health Directive, POLST, or a discussion with a medical provider or your loved ones about your wishes): No, advance care planning information given to patient to review.  Patient plans to discuss their wishes with loved ones or provider.      Social History     Tobacco Use     Smoking status: Never Smoker     Smokeless tobacco: Never Used   Substance Use Topics     Alcohol use: Yes     If you drink alcohol do you typically have >3 drinks per day or >7 drinks per week? No    Alcohol Use 8/31/2022   Prescreen: >3 drinks/day or >7 drinks/week? No   Prescreen: >3 drinks/day or >7 drinks/week? -   No flowsheet data found.    Last PSA: No results found for: PSA    Reviewed orders with patient. Reviewed health maintenance and updated orders accordingly - Yes  Labs reviewed in EPIC    Reviewed and updated as needed this visit by clinical staff   Tobacco  Allergies  Meds  Problems  Med Hx  Surg Hx   "Fam Hx            Reviewed and updated as needed this visit by Provider   Tobacco  Allergies  Meds  Problems  Med Hx  Surg Hx  Fam Hx           Past Medical History:   Diagnosis Date     Immune to hepatitis B 2/12/2020 2/11/2020 - Immune from prior infection     Immune to hepatitis B 2/12/2020 2/11/2020 - Immune from prior infection      History reviewed. No pertinent surgical history.    Review of Systems   Constitutional: Negative for chills and fever.   HENT: Negative for congestion, ear pain, hearing loss and sore throat.    Eyes: Negative for pain and visual disturbance.   Respiratory: Negative for cough and shortness of breath.    Cardiovascular: Negative for chest pain, palpitations and peripheral edema.   Gastrointestinal: Negative for abdominal pain, constipation, diarrhea, heartburn, hematochezia and nausea.   Genitourinary: Negative for dysuria, frequency, genital sores, hematuria, impotence, penile discharge and urgency.   Musculoskeletal: Negative for arthralgias, joint swelling and myalgias.   Skin: Negative for rash.   Neurological: Negative for dizziness, weakness, headaches and paresthesias.   Psychiatric/Behavioral: Negative for mood changes. The patient is not nervous/anxious.          OBJECTIVE:   /68   Pulse 91   Temp 97.9  F (36.6  C) (Oral)   Resp 16   Ht 1.575 m (5' 2\")   Wt 59 kg (130 lb)   SpO2 99%   BMI 23.78 kg/m      Physical Exam  GENERAL: healthy, alert and no distress  EYES: Eyes grossly normal to inspection, PERRL and conjunctivae and sclerae normal  HENT: ear canals and TM's normal, nose and mouth without ulcers or lesions  NECK: no adenopathy, no asymmetry, masses, or scars and thyroid normal to palpation  RESP: lungs clear to auscultation - no rales, rhonchi or wheezes  CV: regular rate and rhythm, normal S1 S2, no S3 or S4, no murmur, click or rub, no peripheral edema and peripheral pulses strong  ABDOMEN: soft, nontender, no hepatosplenomegaly, no " "masses and bowel sounds normal  MS: no gross musculoskeletal defects noted, no edema  SKIN: no suspicious lesions or rashes  NEURO: Normal strength and tone, mentation intact and speech normal  PSYCH: mentation appears normal, affect normal/bright    Diagnostic Test Results:  Labs reviewed in Epic    ASSESSMENT/PLAN:   Pablo was seen today for physical.    Diagnoses and all orders for this visit:    Routine general medical examination at a health care facility    Type 2 diabetes mellitus with hyperglycemia, with long-term current use of insulin (H)  I cannot adjust his medications because he does not check his glucose. Denies any symptoms but his vision is blurry. Will refer to ophthalmology, has not seen in 2 years.   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 He says he sleeps well, mood is good.     Chronic hepatitis C  (H)  Unsure about the GI referral, patient willing to see but will be difficult to schedule. Will discuss again at next visit.     Adjustment disorder with depressed mood  Noncompliance with medication regimen  He is able to tell me all his medications, I do think he has been compliant. I worry about his mental health, I wonder if he has a neurocognitive issue. Evaluation in 2 weeks scheduled with [facility]                COUNSELING:   Reviewed preventive health counseling, as reflected in patient instructions       Regular exercise       Healthy diet/nutrition       Advance Care Planning    Estimated body mass index is 23.78 kg/m  as calculated from the following:    Height as of this encounter: 1.575 m (5' 2\").    Weight as of this encounter: 59 kg (130 lb).         He reports that he has never smoked. He has never used smokeless tobacco.      Counseling Resources:  ATP IV Guidelines  Pooled Cohorts Equation Calculator  FRAX Risk Assessment  ICSI Preventive " Guidelines  Dietary Guidelines for Americans, 2010  USDA's MyPlate  ASA Prophylaxis  Lung CA Screening    Peggy Ortiz MD  [facility]  Answers for HPI/ROS submitted by the patient on 8/31/2022  If you checked off any problems, how difficult have these problems made it for you to do your work, take care of things at home, or get along with other people?: Not difficult at all  PHQ9 TOTAL SCORE: 1

## 2022-08-31 NOTE — PROGRESS NOTES
Clinic Care Coordination Contact    Follow Up Progress Note      Assessment: CCRN connected patient with Lyndseyda - care coordinator through [employer] today. Patient's cell phone wasn't working the past few days because he didn't pay his phone bill. Patient attended his preventative care appt today with PCP. Patient has an appt scheduled with [facility] for neuro-psych eval on 9/15/22 at 9:30am. Lisbeth will attend appt with patient. Educated patient on the important of attending his appts and paying his phone bill on time so people can reach him.     Yearly eye exam  - will ask Lisbeth to assist him.     Care Gaps:    Health Maintenance Due   Topic Date Due     Pneumococcal Vaccine: Pediatrics (0 to 5 Years) and At-Risk Patients (6 to 64 Years) (1 - PCV) Never done     EYE EXAM  07/10/2021     INFLUENZA VACCINE (1) 09/01/2022 11/7/2022 with PCP    Care Plans  Care Plan: Neuro-psych Eval     Problem: Neuro-psych Evaluation     Goal: I want to complete a Neuropsychological assessment in the next 6 months so that I can better understand what supportive services I may want to pursue.     Start Date: 5/10/2022 Expected End Date: 11/10/2022    This Visit's Progress: 20%    Priority: High    Note:     oal Statement: I want to complete a Neuropsychological assessment in the next 6 months so that I can better understand what supportive services I may want to pursue.    Date Goal set: 5/10/2022  Barriers: language barrier  Strengths: Willing to schedule  Date to Achieve By: 11/10/2022  Patient expressed understanding of goal: Yes    Action steps to achieve this goal:  1. I will attend my appointment for psych evaluation as scheduled at [facility] on 9/15/2022 at 9:30 AM.   2. I will update [facility] team regarding this goal.     Note: Patient is scheduled for evaluation at [facility] on 9/15/2022 at 9:30 AM.      [facility], [phone], [email or web address]   [address] [place] [address] [address]  [place] [address]                       Outreach Frequency: 6 weeks    The patient consented via Verbal consent to have contact information and resources sent via text in an unencrypted manner.    Plan:   1) Patient will attend his [facility] for neuro-psych eval on 9/15/2022 at 930am.   2) [facility] coordinator with [facility] will assist with eye appt.  3) CHW to remind pt of his [facility] on 9/15/2022

## 2022-08-31 NOTE — LETTER
[facility]  Patient Centered Plan of Care  About Me:        Patient Name:  Pablo Russo    YOB: 1980  Age:         41 year old   [place] MRN:    [id number] Telephone Information:  Home Phone [phone]   Mobile [phone]       Address:  [address] [place] [address] Email address:  No e-mail address on record      Emergency Contact(s)    Name Relationship Lgl Grd Work Phone Home Phone Mobile Phone           Primary language:  Radha     needed? Yes   [employer]:  [phone] op. 1  Other communication barriers:Language barrier; Lack of coping    Preferred Method of Communication:     Current living arrangement: I live in a private home    Mobility Status/ Medical Equipment: Independent        Health Maintenance  Health Maintenance Reviewed: Due/Overdue       My Access Plan  Medical Emergency 911   Primary Clinic Line [facility] [phone]   24 Hour Appointment Line [phone] or  [phone]) (toll-free)   24 Hour Nurse Line [phone] (toll-free)   Preferred Urgent Care [facility] [phone]     Preferred Hospital [facility]  [phone]     Preferred Pharmacy [facility] - [place], [place] - [address]     Behavioral Health Crisis Line The National Suicide Prevention Lifeline at 1-171.965.1476 or Text/Call 258             My Care Team Members  Patient Care Team       Relationship Specialty Notifications Start End    Peggy Ortiz MD PCP - General   6/15/20     Phone: [phone] Fax: [phone]         [address] [place] [address]    Charlene Ovalle, PharmD Pharmacist Pharmacist  5/20/21     Phone: [phone]          [facility] [address] [place] [address]    Peggy Ortiz MD Assigned PCP   6/16/21     Phone: [phone] Fax: [phone]         [address] [place]  [address]    Glenys Lantigua, RN Lead Care Coordinator Primary Care - CC Admissions 3/10/22     Dilan Clark Community Health Worker Primary Care - CC Admissions 3/10/22     Phone: [phone] Fax: [phone]         [address] [place] [address] [address] [facility] [address]    Peggy Ortiz MD Referring Physician Family Medicine  9/16/22     Phone: [phone] Fax: [phone]         [address] [place] [address] [address] [place] [address]    Yissel Celeste MD MD Ophthalmology  9/16/22     Phone: [phone] Fax: [phone]         [address] [facility] [address]            My Care Plans  Self Management and Treatment Plan  Care Plan  Care Plan: Neuro-psych Eval     Problem: Neuro-psych Evaluation     Goal: I want to complete a Neuropsychological assessment in the next 6 months so that I can better understand what supportive services I may want to pursue.     Start Date: 5/10/2022 Expected End Date: 11/10/2022    This Visit's Progress: 20%    Priority: High    Note:     oal Statement: I want to complete a Neuropsychological assessment in the next 6 months so that I can better understand what supportive services I may want to pursue.    Date Goal set: 5/10/2022  Barriers: language barrier  Strengths: Willing to schedule  Date to Achieve By: 11/10/2022  Patient expressed understanding of goal: Yes    Action steps to achieve this goal:  1. I will attend my appointment for psych evaluation as scheduled at [facility] on 9/15/2022 at 9:30 AM.   2. I will update [facility] team regarding this goal.     Note: Patient is scheduled for evaluation at [facility] on 9/15/2022 at 9:30 AM.      [facility], [phone], [email or web address]   [address]  [place] [address]                         Action Plans on File:                       Advance Care Plans/Directives Type:   No data recorded    My Medical and Care Information  Problem List   Patient Active Problem List   Diagnosis     Type 2 diabetes mellitus with  hyperglycemia, with long-term current use of insulin (H)     Insomnia     Lump of skin     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    Care Coordination Start Date: 3/10/2022   Frequency of Care Coordination: 6 weeks     Form Last Updated: 09/28/2022

## 2022-09-14 ENCOUNTER — TELEPHONE (OUTPATIENT)
Dept: FAMILY MEDICINE | Facility: CLINIC | Age: 42
End: 2022-09-14
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
## 2022-09-30 ENCOUNTER — PATIENT OUTREACH (OUTPATIENT)
Dept: CARE COORDINATION | Facility: CLINIC | Age: 42
End: 2022-09-30

## 2022-09-30 NOTE — PROGRESS NOTES
Clinic Care Coordination Contact  [facility]/Voicemail    Clinical Data: Care Coordinator Outreach  Outreach attempted x 1.  Left message on patient's voicemail with call back information and requested return call.    Plan: Care Coordinator will try to reach patient again in 10 business days.    Beatrice Felix CHW, B.A. [facility] Care Coordination  [facility]:   [facility]  [phone]

## 2022-10-13 ENCOUNTER — PATIENT OUTREACH (OUTPATIENT)
Dept: CARE COORDINATION | Facility: CLINIC | Age: 42
End: 2022-10-13

## 2022-10-13 NOTE — PROGRESS NOTES
Clinic Care Coordination Contact  [facility]/Voicemail       Clinical Data: Care Coordinator Outreach  Outreach attempted x 2.  Left message on patient's voicemail with call back information and requested return call.    Plan: Care Coordinator will route patient to RN CC to determine if further outreaches should be made.    Beatrice Felix CHW, B.A. [facility] Care Coordination  [facility]:   [facility]  [phone]

## 2022-10-13 NOTE — PROGRESS NOTES
Care Coordination Clinician Chart Review     Situation: Patient chart reviewed by care coordinator.?     Background: Initial assessment and enrollment to Care Coordination was 3/10/22  .?? Care plan(s) with patient-centered goal(s) were developed with participation from patient.? Lead CC handed patient off to CHW for continued outreach every 30 days.??     Assessment: Per chart review, patient outreach completed by CC CHW on 7/25/2022 .? Patient is actively working to accomplish goal(s).? Patient's goal(s) remain(s) appropriate at this time.? Patient is not due for updated Plan of Care.? Annual assessment will be due 3/10/2022. CCRN wasn't able to reach patient today. CCRN then spoke with Jemima, care coordinator with [facility] today. [facility] will continue to assist patient with his citizenship process. Patient attended his medical waiver appt with Cesar as scheduled per Lisbeth. Lisbeth will continues to assist patient switching to a different health plan next month and will continue to assist with citizenship process.         Plan/Recommendations: Patient is transitioned to maintenance today.    Plan of Care updated and sent to patient: No

## 2022-10-17 ENCOUNTER — PATIENT OUTREACH (OUTPATIENT)
Dept: CARE COORDINATION | Facility: CLINIC | Age: 42
End: 2022-10-17

## 2022-10-17 NOTE — PROGRESS NOTES
Clinic Care Coordination Contact  Patient has completed all goals with Clinic Care Coordination.  Please review the chart and confirm if maintenance  is approved.    -[facility] RN approved for patient to be on maintenance and CHW will do outreach in two months.  -Patient is connected with [facility] services.  -Patient will call [facility] worker for additional support and resources when needed.

## 2022-10-29 ENCOUNTER — OFFICE VISIT (OUTPATIENT)
Dept: FAMILY MEDICINE | Facility: CLINIC | Age: 42
End: 2022-10-29
Payer: COMMERCIAL

## 2022-10-29 VITALS
SYSTOLIC BLOOD PRESSURE: 126 MMHG | OXYGEN SATURATION: 98 % | DIASTOLIC BLOOD PRESSURE: 86 MMHG | RESPIRATION RATE: 16 BRPM | HEART RATE: 130 BPM | WEIGHT: 124.6 LBS | HEIGHT: 62 IN | TEMPERATURE: 97.9 F | BODY MASS INDEX: 22.93 KG/M2

## 2022-10-29 DIAGNOSIS — N48.1 BALANITIS: Primary | ICD-10-CM

## 2022-10-29 PROCEDURE — 99213 OFFICE O/P EST LOW 20 MIN: CPT | Performed by: FAMILY MEDICINE

## 2022-10-29 RX ORDER — CLOTRIMAZOLE 1 %
CREAM (GRAM) TOPICAL 2 TIMES DAILY PRN
Qty: 60 G | Refills: 0 | Status: SHIPPED | OUTPATIENT
Start: 2022-10-29 | End: 2022-12-28

## 2022-10-29 NOTE — PROGRESS NOTES
Pablo Russo is a 41 year old male who comes in today for symptoms that started two weeks ago    We used phone Radha     Itching in private areas    Now swollen down there, still itches          On exam patient has foreskin present    When he retracts the foreskin, has red irritation and white material present on glans penis in circumferential fashion    The area right around urethra looks normal    Very typical appearance for balanitis in this uncircumcised male        ASSESSMENT / PLAN:  (N48.1) Balanitis  (primary encounter diagnosis)  Comment: discussed in detail with patient via .  Advise cleaning area and dabbing it try, then applying antifungal cream.  Do this 2x daily.  Follow up in clinic / urgent care if not resolving.   Plan: clotrimazole (LOTRIMIN) 1 % external cream             Patient does have clinic appointment for mid Nov    Can get area checked at that time    Be seen promptly if symptoms acutely worsen     Patient agreed with plan       I reviewed the patient's medications, allergies, medical history, family history, and social history.    Yoshi Rueda MD

## 2022-10-29 NOTE — PATIENT INSTRUCTIONS
Clean area with soap and water, dry off, and then apply the antifungal cream    We sent in prescription     Use the cream 2x daily     If not better in 2-3 weeks, be seen in clinic or urgent care

## 2022-11-02 ENCOUNTER — TELEPHONE (OUTPATIENT)
Dept: FAMILY MEDICINE | Facility: CLINIC | Age: 42
End: 2022-11-02

## 2022-11-02 DIAGNOSIS — Z76.0 ENCOUNTER FOR MEDICATION REFILL: Primary | ICD-10-CM

## 2022-11-02 RX ORDER — OLOPATADINE HYDROCHLORIDE 1 MG/ML
1 SOLUTION/ DROPS OPHTHALMIC 2 TIMES DAILY
Qty: 5 ML | Refills: 3 | Status: SHIPPED | OUTPATIENT
Start: 2022-11-02 | End: 2022-11-17

## 2022-11-02 NOTE — TELEPHONE ENCOUNTER
Reason for Call:  Other prescription    Detailed comments: PATIENT IS CURRENTLY SCHEDULED WITH ALTERNATE PROVIDER, DONTA HENRIQUEZ IN [facility], BUT WOULD LIKE TO KNOW IF PCP, KAYLI PEREIRA, COULD GET HIM A TEMPORARY REFILL OF PRESCRIPTION olopatadine (PATANOL) 0.1 % ophthalmic solution Place 1 drop into both eyes 2 times UNTIL APPOINTMENT DATE.     Phone Number Patient can be reached at: Home number on file [phone] (home)    Best Time: ANYTIME     Can we leave a detailed message on this number? YES    Call taken on 11/2/2022 at 1:09 PM by Tone Hines

## 2022-11-04 ENCOUNTER — TELEPHONE (OUTPATIENT)
Dept: FAMILY MEDICINE | Facility: CLINIC | Age: 42
End: 2022-11-04

## 2022-11-04 NOTE — TELEPHONE ENCOUNTER
General Call    Contacts       Type Contact Phone/Fax    11/04/2022 03:39 PM CDT Phone (Incoming) Amee [facility]         Reason for Call:  Amee from [facility] calling - pt is restricted to one clinic one pharmacy and one hospital.     What are your questions or concerns:  Dr. Gamez on 11/2/22 gave orders for eye drops.  Need Dr. Ortiz to sign off, but she is out of clinic for another week.        Okay to leave a detailed message?: No Amee will call back if anything further is needed. Thanks    Call taken on 11/4/22 at 342 pm by Melba Rivas CMA TC

## 2022-11-10 ENCOUNTER — OFFICE VISIT (OUTPATIENT)
Dept: FAMILY MEDICINE | Facility: CLINIC | Age: 42
End: 2022-11-10
Payer: COMMERCIAL

## 2022-11-10 VITALS
RESPIRATION RATE: 18 BRPM | HEART RATE: 91 BPM | BODY MASS INDEX: 22.15 KG/M2 | OXYGEN SATURATION: 95 % | TEMPERATURE: 98.7 F | SYSTOLIC BLOOD PRESSURE: 124 MMHG | WEIGHT: 121.1 LBS | DIASTOLIC BLOOD PRESSURE: 85 MMHG

## 2022-11-10 DIAGNOSIS — Z79.4 TYPE 2 DIABETES MELLITUS WITH HYPERGLYCEMIA, WITH LONG-TERM CURRENT USE OF INSULIN (H): ICD-10-CM

## 2022-11-10 DIAGNOSIS — E11.65 TYPE 2 DIABETES MELLITUS WITH HYPERGLYCEMIA, WITH LONG-TERM CURRENT USE OF INSULIN (H): ICD-10-CM

## 2022-11-10 DIAGNOSIS — F51.01 PRIMARY INSOMNIA: ICD-10-CM

## 2022-11-10 DIAGNOSIS — Z76.0 ENCOUNTER FOR MEDICATION REFILL: Primary | ICD-10-CM

## 2022-11-10 DIAGNOSIS — R35.0 URINARY FREQUENCY: Primary | ICD-10-CM

## 2022-11-10 LAB
ALBUMIN UR-MCNC: NEGATIVE MG/DL
APPEARANCE UR: CLEAR
BILIRUB UR QL STRIP: NEGATIVE
COLOR UR AUTO: YELLOW
GLUCOSE UR STRIP-MCNC: 500 MG/DL
HGB UR QL STRIP: NEGATIVE
KETONES UR STRIP-MCNC: NEGATIVE MG/DL
LEUKOCYTE ESTERASE UR QL STRIP: NEGATIVE
NITRATE UR QL: NEGATIVE
PH UR STRIP: 7 [PH] (ref 5–8)
SP GR UR STRIP: 1.01 (ref 1–1.03)
UROBILINOGEN UR STRIP-ACNC: 0.2 E.U./DL

## 2022-11-10 PROCEDURE — 87591 N.GONORRHOEAE DNA AMP PROB: CPT | Performed by: NURSE PRACTITIONER

## 2022-11-10 PROCEDURE — 81003 URINALYSIS AUTO W/O SCOPE: CPT | Performed by: NURSE PRACTITIONER

## 2022-11-10 PROCEDURE — 99213 OFFICE O/P EST LOW 20 MIN: CPT | Performed by: NURSE PRACTITIONER

## 2022-11-10 PROCEDURE — 87491 CHLMYD TRACH DNA AMP PROBE: CPT | Performed by: NURSE PRACTITIONER

## 2022-11-10 RX ORDER — QUETIAPINE FUMARATE 100 MG/1
100 TABLET, FILM COATED ORAL AT BEDTIME
Qty: 90 TABLET | Refills: 3 | Status: SHIPPED | OUTPATIENT
Start: 2022-11-10 | End: 2022-12-28

## 2022-11-10 NOTE — PATIENT INSTRUCTIONS
Request refills on your medicines     Metformin  Insulin  Lipitor   Seroquel for sleep   Celecoxib

## 2022-11-10 NOTE — PROGRESS NOTES
Assessment & Plan     Urinary frequency    - UA macro with reflex to Microscopic and Culture - Clinc Collect  - Chlamydia & Gonorrhea by PCR, GICH/Range - Clinic Collect    Type 2 diabetes mellitus with hyperglycemia, with long-term current use of insulin (H)       Patient with history of type 2 diabetes, not taking of his medications for this, with urinary frequency and complaints of chronic low back pain.     Negative urine today with exception of glucose.  Did do STD screening.    Advised to request refills of all of his medications.  Made a list of medicine he should request a refill for for both back pain and diabetes.    Told patient diabetes medicine does not give him back pain and that he should not stop his medication ever without talking to his primary care provider.  Does have a PCP appointment on November 15              No follow-ups on file.    Brooke Tran [facility] is a 41 year old male who presents to clinic today for the following health issues:  Chief Complaint   Patient presents with     UTI     Pt states started about 1 year Urgent urination and pain on kidney and bladder area,back pain      HPI    History of type 2 diabetes.  Urinating every hour.  Headache, back pain.  Is restricted to 1 clinic, 1 pharmacy and 1 hospital.  +chronic back pain history.      Not taking any of his medicines.  He thinks diabetes medicine gives him back pain.      Recently been seen for balanitis treated with clotrimazole on October 29.        Review of Systems  See HPI      Objective    /85 (BP Location: Right arm, Patient Position: Sitting, Cuff Size: Adult Regular)   Pulse 91   Temp 98.7  F (37.1  C) (Oral)   Resp 18   Wt 54.9 kg (121 lb 1.6 oz)   SpO2 95%   BMI 22.15 kg/m    Physical Exam  Constitutional:       Appearance: He is well-developed and well-nourished.   HENT:      Right Ear: External ear normal.      Left Ear: External ear normal.    Eyes:      General:         Right eye: No discharge.         Left eye: No discharge.      Conjunctiva/sclera: Conjunctivae normal.   Cardiovascular:      Pulses: Intact distal pulses.   Pulmonary:      Effort: Pulmonary effort is normal.   Musculoskeletal:         General: Tenderness (Across lower back) present. Normal range of motion.   Skin:     General: Skin is warm.   Neurological:      Mental Status: He is alert and oriented to person, place, and time.   Psychiatric:         Mood and Affect: Mood and affect and mood normal.         Behavior: Behavior normal.         Thought Content: Thought content normal.         Judgment: Judgment normal.            Results for orders placed or performed in visit on 11/10/22 (from the past 24 hour(s))   UA macro with reflex to Microscopic and Culture - Clinc Collect    Specimen: Urine, Clean Catch   Result Value Ref Range    Color Urine Yellow Colorless, Straw, Light Yellow, Yellow    Appearance Urine Clear Clear    Glucose Urine 500 (A) Negative, 1000 , >=2000 mg/dL    Bilirubin Urine Negative Negative    Ketones Urine Negative Negative, 160  mg/dL    Specific Gravity Urine 1.015 1.005 - 1.030    Blood Urine Negative Negative    pH Urine 7.0 5.0 - 8.0    Protein Albumin Urine Negative Negative, 300 , >=2000 mg/dL    Urobilinogen Urine 0.2 0.2, 1.0 E.U./dL    Nitrite Urine Negative Negative    Leukocyte Esterase Urine Negative Negative    Narrative    Microscopic not indicated

## 2022-11-11 LAB
C TRACH DNA SPEC QL PROBE+SIG AMP: NEGATIVE
N GONORRHOEA DNA SPEC QL NAA+PROBE: NEGATIVE

## 2022-11-15 DIAGNOSIS — F51.01 PRIMARY INSOMNIA: ICD-10-CM

## 2022-11-15 DIAGNOSIS — Z76.0 ENCOUNTER FOR MEDICATION REFILL: Primary | ICD-10-CM

## 2022-11-15 RX ORDER — MIRTAZAPINE 7.5 MG/1
7.5 TABLET, FILM COATED ORAL AT BEDTIME
Qty: 90 TABLET | Refills: 3 | Status: SHIPPED | OUTPATIENT
Start: 2022-11-15 | End: 2022-11-17

## 2022-11-16 ENCOUNTER — OFFICE VISIT (OUTPATIENT)
Dept: FAMILY MEDICINE | Facility: CLINIC | Age: 42
End: 2022-11-16
Payer: COMMERCIAL

## 2022-11-16 ENCOUNTER — NURSE TRIAGE (OUTPATIENT)
Dept: NURSING | Facility: CLINIC | Age: 42
End: 2022-11-16

## 2022-11-16 VITALS
BODY MASS INDEX: 22.77 KG/M2 | SYSTOLIC BLOOD PRESSURE: 119 MMHG | WEIGHT: 124.5 LBS | DIASTOLIC BLOOD PRESSURE: 83 MMHG | TEMPERATURE: 98.2 F | RESPIRATION RATE: 16 BRPM | HEART RATE: 96 BPM | OXYGEN SATURATION: 95 %

## 2022-11-16 DIAGNOSIS — T15.92XA FOREIGN BODY, EYE, LEFT, INITIAL ENCOUNTER: Primary | ICD-10-CM

## 2022-11-16 PROCEDURE — 99214 OFFICE O/P EST MOD 30 MIN: CPT | Performed by: PHYSICIAN ASSISTANT

## 2022-11-16 NOTE — PATIENT INSTRUCTIONS
Use of over-the-counter eyedrops for wetting the eye    Follow up with [facility] for reevaluation.

## 2022-11-16 NOTE — TELEPHONE ENCOUNTER
Pt calling with  on the line     Pt states he had an unknown object fly into one of his eyes at work 2 months ago but did not realize right away so he did not irrigate eye at all at the time but since then has been feeling a sharp pain that he rates a 5/10. Eye is red and teary but there is no other bleeding or drainage. He has to squint and blink more often than normal and states that it is difficult to see out of sometimes. 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 He is unable to physical see an object in his eye     Per protocol, pt was advised [place] [facility] [place]. He states he made an appointment for tomorrow @ 2:40 but doesn't think he'll be able to make it as he has a FP appointment @ 3 as well. He tried to reschedule but nothing was available until January. Advised to go [place] [facility] [place] for eye and cancel eye appt for tomorrow so he can see his FP doctor. Pt is agreeable and verbalizes understanding       Reason for Disposition    Tearing or blinking that persists > 1 hour since irrigation (regardless of duration of flushing)    Additional Information    Negative: Doesn't sound like FB in the eye    Negative: Foreign body is a chemical    Negative: Foreign body (FB) hit eye at high speed (e.g., small metallic chip from hammering, lawnmower, BB gun, explosion)    Negative: Foreign body (FB) stuck on eyeball    Negative: Sharp FB (even if FB was removed) and any pain present now    Negative: Eye has been washed out > 30 minutes ago and still feels like FB is still present    Negative: Blurred vision that persists >1 hour after irrigation (regardless of duration of flushing)    Negative: Eye pain that persists > 1 hour after irrigation (regardless of duration of flushing)    Protocols used: EYE - FOREIGN BODY-A-OH      Mariaelena Navarrete RN [facility] November 16, 2022 1:55 PM

## 2022-11-16 NOTE — PROGRESS NOTES
Patient presents with:  Eye Problem: X 2 months. Pt states particle at work got in eye. Lt eye irration. Michael e pain. Watery.      Clinical Decision Making:  There is concern for possible foreign body in the eye.  There was no fluorescein uptake with staining of the eye and no corneal abrasion noted.  With inspection of the eye there was no foreign body noted.  Irrigation also was used to flush any nonvisible irritant.  Patient will follow-up with [facility] tomorrow if anything less than 100% resolution or if new symptoms or concerns arise.      ICD-10-CM    1. Foreign body, eye, left, initial encounter  T15.92XA           Patient Instructions   Use of over-the-counter eyedrops for wetting the eye    Follow up with [facility] for reevaluation.          HPI:  Pablo Russo is a 41 year old male who presents today for foreign body sensation into the left eye.  Patient works for [employer] with concern for possible foreign body in the left eye.  Patient states that there is a small piece of wood that fell off one of the trays and may have hit him in the eye.  He was not wearing eye protection.  Patient does not describe a penetrating injury but rather a foreign body sensation into the left eye.  Date of injury was today at 11/16/2022.  No involvement of the contralateral right eye    History obtained from chart review and the patient.    Problem List:  2021-05: Chronic hepatitis C  (H)  2021-05: Migraine  2021-05: Noncompliance with medication regimen  2021-05: Meibomian gland dysfunction  2020-02: Insomnia  2020-02: Adjustment disorder with depressed mood  2020-02: Type 2 diabetes mellitus with hyperglycemia, with long-term   current use of insulin (H)  2020-02: Lump of skin  2020-02: Severe major depression without psychotic features (H)      Past Medical History:   Diagnosis Date     Immune to hepatitis B 2/12/2020 2/11/2020 - Immune from prior infection     Immune to hepatitis B 2/12/2020 2/11/2020 -  Immune from prior infection       Social History     Tobacco Use     Smoking status: Never     Smokeless tobacco: Never   Substance Use Topics     Alcohol use: Yes       Review of Systems  As above in HPI otherwise negative.    Vitals:    11/16/22 1705   BP: 119/83   BP Location: Right arm   Patient Position: Sitting   Cuff Size: Adult Regular   Pulse: 96   Resp: 16   Temp: 98.2  F (36.8  C)   TempSrc: Oral   SpO2: 95%   Weight: 56.5 kg (124 lb 8 oz)       General: Patient is resting comfortably no acute distress is afebrile  HEENT: Head is normocephalic atraumatic   eyes are PERRL EOMI sclera anicteric   No tearing or photosensitivity.  Sclera is noninjected non-erythematous  Using tetracaine, the eye was anesthetized and there was fluorescein stain used to stain the eye.  There is no uptake  Next the upper eyelid was everted and foreign debris was not noted.  Using loupe magnification I did not see any stye or other foreign body on the upper eyelid.  Next my attention was turned to the lower eyelid.  The vestibule was also inspected there is no foreign debris noted.  Using sterile water the eye was copiously irrigated with water.  Patient had relief of his foreign body sensation with the course of treatment.  There was no blurred vision or vision changes in his vision fields were grossly intact  Skin: Without rash non-diaphoretic    Physical Exam    At the end of the encounter, I discussed results, diagnosis, medications. Discussed red flags for immediate return to clinic/ER, as well as indications for follow up if no improvement. Patient understood and agreed to plan. Patient was stable for discharge.

## 2022-11-17 ENCOUNTER — OFFICE VISIT (OUTPATIENT)
Dept: FAMILY MEDICINE | Facility: CLINIC | Age: 42
End: 2022-11-17
Payer: COMMERCIAL

## 2022-11-17 ENCOUNTER — TELEPHONE (OUTPATIENT)
Dept: FAMILY MEDICINE | Facility: CLINIC | Age: 42
End: 2022-11-17

## 2022-11-17 ENCOUNTER — TRANSFERRED RECORDS (OUTPATIENT)
Dept: HEALTH INFORMATION MANAGEMENT | Facility: CLINIC | Age: 42
End: 2022-11-17

## 2022-11-17 VITALS
BODY MASS INDEX: 23.1 KG/M2 | HEART RATE: 88 BPM | HEIGHT: 62 IN | TEMPERATURE: 98.4 F | OXYGEN SATURATION: 98 % | RESPIRATION RATE: 16 BRPM | DIASTOLIC BLOOD PRESSURE: 70 MMHG | WEIGHT: 125.5 LBS | SYSTOLIC BLOOD PRESSURE: 98 MMHG

## 2022-11-17 DIAGNOSIS — F32.2 SEVERE MAJOR DEPRESSION WITHOUT PSYCHOTIC FEATURES (H): Primary | ICD-10-CM

## 2022-11-17 DIAGNOSIS — Z79.4 TYPE 2 DIABETES MELLITUS WITH HYPERGLYCEMIA, WITH LONG-TERM CURRENT USE OF INSULIN (H): ICD-10-CM

## 2022-11-17 DIAGNOSIS — E11.65 TYPE 2 DIABETES MELLITUS WITH HYPERGLYCEMIA, WITH LONG-TERM CURRENT USE OF INSULIN (H): ICD-10-CM

## 2022-11-17 DIAGNOSIS — F51.01 PRIMARY INSOMNIA: ICD-10-CM

## 2022-11-17 DIAGNOSIS — Z76.0 ENCOUNTER FOR MEDICATION REFILL: ICD-10-CM

## 2022-11-17 DIAGNOSIS — Z23 ENCOUNTER FOR IMMUNIZATION: ICD-10-CM

## 2022-11-17 DIAGNOSIS — M54.50 BACK PAIN, LUMBOSACRAL: ICD-10-CM

## 2022-11-17 DIAGNOSIS — Z13.220 SCREENING FOR HYPERLIPIDEMIA: ICD-10-CM

## 2022-11-17 DIAGNOSIS — H57.89 IRRITATION OF LEFT EYE: ICD-10-CM

## 2022-11-17 LAB
CREAT UR-MCNC: 35.5 MG/DL
HBA1C MFR BLD: 12.4 % (ref 0–5.6)
MICROALBUMIN UR-MCNC: 16.2 MG/L
MICROALBUMIN/CREAT UR: 45.63 MG/G CR (ref 0–17)
RETINOPATHY: NEGATIVE
RETINOPATHY: NORMAL

## 2022-11-17 PROCEDURE — 83036 HEMOGLOBIN GLYCOSYLATED A1C: CPT | Performed by: FAMILY MEDICINE

## 2022-11-17 PROCEDURE — 80061 LIPID PANEL: CPT | Performed by: FAMILY MEDICINE

## 2022-11-17 PROCEDURE — 36415 COLL VENOUS BLD VENIPUNCTURE: CPT | Performed by: FAMILY MEDICINE

## 2022-11-17 PROCEDURE — 90677 PCV20 VACCINE IM: CPT | Performed by: FAMILY MEDICINE

## 2022-11-17 PROCEDURE — 90471 IMMUNIZATION ADMIN: CPT | Performed by: FAMILY MEDICINE

## 2022-11-17 PROCEDURE — 91312 COVID-19,PF,PFIZER BOOSTER BIVALENT: CPT | Performed by: FAMILY MEDICINE

## 2022-11-17 PROCEDURE — 90686 IIV4 VACC NO PRSV 0.5 ML IM: CPT | Performed by: FAMILY MEDICINE

## 2022-11-17 PROCEDURE — 82043 UR ALBUMIN QUANTITATIVE: CPT | Performed by: FAMILY MEDICINE

## 2022-11-17 PROCEDURE — 90472 IMMUNIZATION ADMIN EACH ADD: CPT | Performed by: FAMILY MEDICINE

## 2022-11-17 PROCEDURE — 99215 OFFICE O/P EST HI 40 MIN: CPT | Mod: 25 | Performed by: FAMILY MEDICINE

## 2022-11-17 PROCEDURE — 0124A COVID-19,PF,PFIZER BOOSTER BIVALENT: CPT | Performed by: FAMILY MEDICINE

## 2022-11-17 RX ORDER — ATORVASTATIN CALCIUM 20 MG/1
20 TABLET, FILM COATED ORAL DAILY
Qty: 90 TABLET | Refills: 3 | Status: SHIPPED | OUTPATIENT
Start: 2022-11-17 | End: 2022-11-22

## 2022-11-17 RX ORDER — MIRTAZAPINE 7.5 MG/1
7.5 TABLET, FILM COATED ORAL AT BEDTIME
Qty: 90 TABLET | Refills: 3 | Status: SHIPPED | OUTPATIENT
Start: 2022-11-17 | End: 2022-11-22

## 2022-11-17 RX ORDER — OLOPATADINE HYDROCHLORIDE 1 MG/ML
1 SOLUTION/ DROPS OPHTHALMIC 2 TIMES DAILY
Qty: 5 ML | Refills: 3 | Status: SHIPPED | OUTPATIENT
Start: 2022-11-17 | End: 2022-11-22

## 2022-11-17 RX ORDER — CELECOXIB 200 MG/1
200 CAPSULE ORAL DAILY
Qty: 25 CAPSULE | Refills: 0 | Status: SHIPPED | OUTPATIENT
Start: 2022-11-17 | End: 2022-11-18

## 2022-11-17 RX ORDER — OLOPATADINE HYDROCHLORIDE 1 MG/ML
1 SOLUTION/ DROPS OPHTHALMIC 2 TIMES DAILY
Qty: 5 ML | Refills: 3 | Status: SHIPPED | OUTPATIENT
Start: 2022-11-17 | End: 2022-11-17

## 2022-11-17 ASSESSMENT — ENCOUNTER SYMPTOMS: EYE PAIN: 1

## 2022-11-17 NOTE — PROGRESS NOTES
Assessment & Plan     Type 2 diabetes mellitus with hyperglycemia, with long-term current use of insulin (H)    Patient states that he is taking his medications but he never checks his blood sugar last A1c was below 7.5 we will recheck when value today.  I refilled his medications.  He understands that he should be checking his blood sugars he states that he does not watch his carbohydrates.  The A1c today was 12.4 we will have staff reach out to him and make an appointment with his provider in 4 weeks  - Adult Eye  Referral; Future  - Lipid panel reflex to direct LDL Non-fasting; Future  - Albumin Random Urine Quantitative with Creat Ratio; Future  - HEMOGLOBIN A1C; Future  - metFORMIN (GLUCOPHAGE) 1000 MG tablet; TAKE 1 TABLET (1,000 MG) BY MOUTH 2 TIMES DAILY (WITH MEALS) Strength: 1,000 mg  - atorvastatin (LIPITOR) 20 MG tablet; Take 1 tablet (20 mg) by mouth daily  - Lipid panel reflex to direct LDL Non-fasting  - Albumin Random Urine Quantitative with Creat Ratio  - HEMOGLOBIN A1C    Screening for hyperlipidemia    Patient agrees to have his lipid profile reviewed today we will check a lipid panel he is not fasting  - Lipid panel reflex to direct LDL Non-fasting; Future  - Lipid panel reflex to direct LDL Non-fasting    Severe major depression without psychotic features (H)    He applied for citizenship apparently he never got a referral to the [facility] wellness group for a waiver for citizenship  - Adult Neuropsychology Referral; Future    Irritation of left eye    Urgent care notes reviewed he has had 2 months of left eye irritation.  No acute loss of vision he does have some photophobia he is constantly tearing on the left side today saw evidence of conjunctival injection on the lateral canthus with some excessive watering I refilled his Patanol and I agree  Visit for him to see ophthalmology  - Adult Eye  Referral; Future    Encounter for medication refill      - mirtazapine  (REMERON) 7.5 MG tablet; Take 1 tablet (7.5 mg) by mouth At Bedtime  - olopatadine (PATANOL) 0.1 % ophthalmic solution; Place 1 drop into both eyes 2 times daily    Encounter for immunization    He agrees for immunizations today    Primary insomnia    He says he not sleeping mother refilled his mirtazapine  - mirtazapine (REMERON) 7.5 MG tablet; Take 1 tablet (7.5 mg) by mouth At Bedtime    Back pain, lumbosacral    He continues to have back pain that radiates into his lower back from his mid back area.  Refill Celebrex side effects discussed in detail.  - celecoxib (CELEBREX) 200 MG capsule; Take 1 capsule (200 mg) by mouth daily      No follow-ups on file.    Cade Henriquez MD  [facility]    [facility]   Hsa is a 41 year old, presenting for the following health issues:  Eye Problem (Follow up on blurry vision)  Follow-up diabetes and medications questions regarding back pain questions regarding immunizations  History of presenting illness    41-year-old male with type 2 diabetes here for follow-up he was seen in urgent care recently and went there because he had a 2-month history of blurry vision.  He was asked to go to see his eye doctor and came here instead.  He says his symptoms have been going on for 2 months they have not been getting worse he says its blurry only in his left eye.  He denies any injuries to the site.  He says his eye is also watering.  He says that he received drops for this in the past but is run out of that medication he also states that his eyes are very itchy.  He has a diabetic and does take medication says he is out of some of his medications for diabetes but never checks his sugar he says he is also out of his back pain medicine and medication for sleep.  Says it is difficult for him to sleep for more than 3 hours.  Denies any headaches.  Denies any dizziness.    Eye Problem     History of Present Illness       Reason for visit:  Blurry vision              Review  "of Systems   Eyes: Positive for pain.   Neuro positive for difficulty with sleep  10 point review of other systems negative        Objective    BP 98/70   Pulse 88   Temp 98.4  F (36.9  C) (Oral)   Resp 16   Ht 1.575 m (5' 2\")   Wt 56.9 kg (125 lb 8 oz)   SpO2 98%   BMI 22.95 kg/m    Body mass index is 22.95 kg/m .  Physical Exam   GENERAL: healthy, alert and no distress  EYES: Pupils equal and reactive to light, there is injection of the lateral canthus of the left eye.  Excessive watering noted in the medial canthus.  Extraocular movements are normal  HENT: ear canals and TM's normal, nose and mouth without ulcers or lesions  NECK: no adenopathy, no asymmetry, masses, or scars and thyroid normal to palpation  RESP: lungs clear to auscultation - no rales, rhonchi or wheezes  CV: regular rate and rhythm, normal S1 S2, no S3 or S4, no murmur, click or rub, no peripheral edema and peripheral pulses strong  ABDOMEN: soft, nontender, no hepatosplenomegaly, no masses and bowel sounds normal  MS: no gross musculoskeletal defects noted, no edema  SKIN: no suspicious lesions or rashes  NEURO: Normal strength and tone, mentation intact and speech normal  PSYCH: mentation appears normal, affect normal/bright          Approximately 40 minutes were spent in coordination of care with the patient today with the help of a certified phone       "

## 2022-11-17 NOTE — TELEPHONE ENCOUNTER
General Call    Contacts       Type Contact Phone/Fax    11/17/2022 03:47 PM CST Phone (Incoming) Pablo Russo M (Self) [phone] (M)        Reason for Call:  Provider saw pt earlier today.      What are your questions or concerns:  While pt with speciality - he told  he has a rash/itching on back,. Which I saw, bilateral arms, chest.  Denies changing any soaps, lotions, etc.  No bumps notice - just scratching due to dry skin.      Pt is asking for cream to put on. Need to specify body parts.  He did not mention this to you today when he saw you.     Date of last appointment with provider: today    Okay to leave a detailed message?: Yes at Home number on file [phone] (home)    Call taken on 11/17/22 at 355 pm by KARINA Montes

## 2022-11-17 NOTE — RESULT ENCOUNTER NOTE
Is inform patient that he needs to check his blood sugars regularly and take his medication his diabetes values are very high and he should see his doctor within 4 weeks

## 2022-11-17 NOTE — TELEPHONE ENCOUNTER
His diabetes is out of control which is causing his itchy skin, the best medication I could offer him would be to use Vaseline or a moisturizing lotion he should pick this up over-the-counter and try that

## 2022-11-18 ENCOUNTER — TELEPHONE (OUTPATIENT)
Dept: FAMILY MEDICINE | Facility: CLINIC | Age: 42
End: 2022-11-18

## 2022-11-18 DIAGNOSIS — M54.50 BACK PAIN, LUMBOSACRAL: ICD-10-CM

## 2022-11-18 DIAGNOSIS — F51.01 PRIMARY INSOMNIA: ICD-10-CM

## 2022-11-18 DIAGNOSIS — E11.65 TYPE 2 DIABETES MELLITUS WITH HYPERGLYCEMIA, WITH LONG-TERM CURRENT USE OF INSULIN (H): ICD-10-CM

## 2022-11-18 DIAGNOSIS — Z79.4 TYPE 2 DIABETES MELLITUS WITH HYPERGLYCEMIA, WITH LONG-TERM CURRENT USE OF INSULIN (H): ICD-10-CM

## 2022-11-18 DIAGNOSIS — Z76.0 ENCOUNTER FOR MEDICATION REFILL: ICD-10-CM

## 2022-11-18 LAB
CHOLEST SERPL-MCNC: 126 MG/DL
HDLC SERPL-MCNC: 30 MG/DL
LDLC SERPL CALC-MCNC: 46 MG/DL
NONHDLC SERPL-MCNC: 96 MG/DL
TRIGL SERPL-MCNC: 248 MG/DL

## 2022-11-18 NOTE — TELEPHONE ENCOUNTER
----- Message from Cade Henriquez MD sent at 11/17/2022  3:39 PM CST -----  Is inform patient that he needs to check his blood sugars regularly and take his medication his diabetes values are very high and he should see his doctor within 4 weeks

## 2022-11-18 NOTE — TELEPHONE ENCOUNTER
FYI - Status Update    Who is Calling: [facility]     Update: Pt has HAD a visit with KEVIN/  Dr. Hussein used the following diagnoses:  Unspecified depression  Unspecified trauma an distress.     She felt he was not putting forth his best effort and should be reassessed in one year should pt have memory issues.  Copy of report placed in PCP blue folder and also sent to be scanned. Thanks    Does caller want a call/response back: No     Call taken on 11/18/22 at 1239 pm by Melba Rivas CMA TC

## 2022-11-18 NOTE — TELEPHONE ENCOUNTER
"Called pt and relayed lab result. Pt verbalized understanding and scheduled appt with Dr. Ortiz on 12/28.    - pt stated he applied for citizenship and would like PCP's assistance. Pt stated a month ago he had an appt and they tested him. He does not know where it is, who he saw, or when exactly that was. He said that they made him look at pictures and he had to remember what they were. He said they told him they would send records to Dr. Ortiz. Pt stated he tried to get the citizenship waiver for awhile now and would like to know if there is an update. Does PCP have any updates?    - chart reviewed. Per Dr. Henriquez \"He applied for citizenship apparently he never got a referral to the [facility] wellness group for a waiver for citizenship  - Adult Neuropsychology Referral; Future\"      Jake Boyce Cem Say, BSN RN  [facility]    "

## 2022-11-18 NOTE — TELEPHONE ENCOUNTER
Called pt to relay PCP message.  Gave names and places where pt could go to  lotion.  Understood. Thanks

## 2022-11-18 NOTE — TELEPHONE ENCOUNTER
Medication Question or Refill    Contacts       Type Contact Phone/Fax    11/18/2022 10:51 AM CST Phone (Incoming) Amee (Other)      [facility]    11/18/2022 11:05 AM CST Phone (Incoming) Amee (Other)     11/18/2022 11:06 AM CST Phone (Incoming) [facility] (Other)           What medication are you calling about (include dose and sig)?: atorvastatin (LIPITOR) 20 MG tablet, celecoxib (CELEBREX) 200 MG capsule, metFORMIN (GLUCOPHAGE) 1000 MG tablet, mirtazapine (REMERON) 7.5 MG tablet, olopatadine (PATANOL) 0.1 % ophthalmic solution    Controlled Substance Agreement on file:   CSA -- Patient Level:    CSA: None found at the patient level.       Who prescribed the medication?: Dr. Henriquez    Do you need a refill? Yes: Pt is LACEY Waters, Per Amee at [facility], PCP needs to send in new Rx to Pharmacy.    When did you use the medication last?     Patient offered an appointment? No    Do you have any questions or concerns?  No    Preferred Pharmacy:     [facility] - [place], [place] - [address]  [address]  [address]  [place] [address]  Phone: [phone] Fax: [phone]      Okay to leave a detailed message?: Yes at Home number on file [phone] (home)

## 2022-11-22 ENCOUNTER — NURSE TRIAGE (OUTPATIENT)
Dept: NURSING | Facility: CLINIC | Age: 42
End: 2022-11-22

## 2022-11-22 ENCOUNTER — TELEPHONE (OUTPATIENT)
Dept: FAMILY MEDICINE | Facility: CLINIC | Age: 42
End: 2022-11-22

## 2022-11-22 RX ORDER — MIRTAZAPINE 7.5 MG/1
7.5 TABLET, FILM COATED ORAL AT BEDTIME
Qty: 90 TABLET | Refills: 3 | Status: SHIPPED | OUTPATIENT
Start: 2022-11-22 | End: 2022-12-28

## 2022-11-22 RX ORDER — ATORVASTATIN CALCIUM 20 MG/1
20 TABLET, FILM COATED ORAL DAILY
Qty: 90 TABLET | Refills: 3 | Status: SHIPPED | OUTPATIENT
Start: 2022-11-22 | End: 2024-01-08

## 2022-11-22 RX ORDER — OLOPATADINE HYDROCHLORIDE 1 MG/ML
1 SOLUTION/ DROPS OPHTHALMIC 2 TIMES DAILY
Qty: 5 ML | Refills: 3 | Status: SHIPPED | OUTPATIENT
Start: 2022-11-22 | End: 2022-12-28

## 2022-11-22 RX ORDER — CELECOXIB 200 MG/1
200 CAPSULE ORAL DAILY
Qty: 25 CAPSULE | Refills: 0 | Status: SHIPPED | OUTPATIENT
Start: 2022-11-22 | End: 2022-12-28

## 2022-11-22 NOTE — TELEPHONE ENCOUNTER
Nurse Triage SBAR    Is this a 2nd Level Triage?  Yes    Situation: Left eye small wood particle       Background/Assessment:     Pt reporting, he has had a little sliver or a small particle of wood in his left eye for the last 3 months.    His left eye is a little red, irritated and feels like that little particle of wood moves around in the eye.     Pt can see out of the eye.   It is a little watery.    Pt would like to be seen in clinic sometime next week.    Please call the Pt back @ [phone] for further assistance.    Okay to leave detailed message for Pt care.    Elinor Lin RN  Central Triage Red Flags/Med Refills    Protocol Recommended Disposition:   See in Office Today      Reason for Disposition    Patient wants to be seen    Additional Information    Negative: Doesn't sound like FB in the eye    Negative: Foreign body is a chemical    Negative: Foreign body (FB) hit eye at high speed (e.g., small metallic chip from hammering, lawnmower, BB gun, explosion)    Negative: Foreign body (FB) stuck on eyeball    Negative: Sharp FB (even if FB was removed) and any pain present now    Negative: Eye has been washed out > 30 minutes ago and still feels like FB is still present    Negative: Blurred vision that persists >1 hour after irrigation (regardless of duration of flushing)    Negative: Eye pain that persists > 1 hour after irrigation (regardless of duration of flushing)    Negative: Tearing or blinking that persists > 1 hour since irrigation (regardless of duration of flushing)    Negative: Cloudy spot on the cornea (clear part of the eye)    Negative: Sounds like a serious injury to the triager    Negative: SEVERE eye pain    Negative: Yellow or green pus occurs    Negative: Contact lens stuck in eye and unable to remove using CARE ADVICE    Protocols used: EYE - FOREIGN BODY-A-OH

## 2022-11-22 NOTE — TELEPHONE ENCOUNTER
Called patient with the assistance of an  to relay provider message regarding citizenship waiver.  Patient verbalized understood recommendation.    JOSE MARIA Herrera, RN  [facility]

## 2022-11-22 NOTE — TELEPHONE ENCOUNTER
"Patient verbalized to  he wants to kill himself due to unable to apply for citizenship.  Clinic RN verified his address and clarify if patient just verbalized it or really mean it?  Patient verbalized having suicidal thoughts.  RN clinic ask for his plan to hurt himself.  Patient sobbing/crying over the phone feeling frustrated over not being able to apply for citizenship due to legal issue. Clinic RN have another staff in office available to contact 911 for an ambulance if needed.  However, patient reported he DOES NOT have a plan right now to kill himself.  Declined professional medical intervention. Patient able to contract for safety by verbalizing he does not have any plan or act to hurt himself right now or in the near future. Will call the clinic if symptoms worsening.  Patient reported the suicidal thought is not active; it's in the past. Patient reported provider put him on a medication to control thoughts.  Chart review:  Patient was prescribed Seroquel dated 11/10/2022. However, patient reported on 11/17/2022, not taking it due to \"no suicidal thoughts\".  Patient further clarified the suicidal thought is a feeling, not an active plan after RN further assess for his for safety.     Mental health referral information provided to patient through .  Clinic RN offer to call the [facility] to schedule an appointment, patient refused.  Ophthalmology referral contact provided to patient per referral placed from his visit with Dr. Henriquez dated 11/17/2022.  Patient lives with his older brother and his family. Patient is not alone currently.  Has family in the home. Patient further reported has Mosque  and other family members in the community for support mentally and emotionally if needed.   Towards the end of the conversation, patient reported is feeling much better knowing ccc is working with him.  Voice appears calm.  Normal tone of voice throughout the encounter.    RN " offer to follow up with patient the next day for his well-being.  Patient agreed.    Route encounter to provider and ccc.    MERLY HerreraN, RN  [facility]

## 2022-11-22 NOTE — TELEPHONE ENCOUNTER
Spoke to pt with  at 1405    Likely wood chip in eye times 3 months    No vision changes and mild symptoms    Scheduled this Friday with Dr. Ponce    Pt aware of date/time/location/duration/parking/main clinic number and direct number for any further assistance.    Daniel Chapman RN 2:30 PM 11/22/22

## 2022-11-22 NOTE — TELEPHONE ENCOUNTER
Appreciate RN's thoroughness.     Legal issues are not new, patient has been well aware of this for several years.   He  Continues to be nonadherent to medications, also not new.     Will forward message to CCC as well.       Peggy Ortiz MD

## 2022-11-22 NOTE — TELEPHONE ENCOUNTER
Patient was seen by [facility].   They determined he does not qualify for a waiver.   He will have to try to take citizenship tests.   He also is not able to apply for citizenship anyway due to pending legal issues not yet resolved. ccc has been working with him on this.     Peggy Ortiz MD

## 2022-11-23 ENCOUNTER — TELEPHONE (OUTPATIENT)
Dept: FAMILY MEDICINE | Facility: CLINIC | Age: 42
End: 2022-11-23

## 2022-11-23 ENCOUNTER — PATIENT OUTREACH (OUTPATIENT)
Dept: NURSING | Facility: CLINIC | Age: 42
End: 2022-11-23
Payer: COMMERCIAL

## 2022-11-23 NOTE — TELEPHONE ENCOUNTER
CLAYI: I spoke with patient today. See my outreach notes for details. He agreed to come see me in person on 11/29/22 at 2pm.

## 2022-11-23 NOTE — TELEPHONE ENCOUNTER
Called with assistance of an  to follow up on patient from previous encounter. Patient reported to be feeling much today.  Denied suicidal or homicidal ideation.  Advised to call back to clinic with any further questions.  Patient understood recommendation.    JOSE MARIA Herrera, RN  [facility]

## 2022-11-23 NOTE — PROGRESS NOTES
Clinic Care Coordination Contact    Follow Up Progress Note      Assessment: CCRN spoke with patient today via phone. Patient declined active suicidal thoughts. Patient states he's stressed out because he was told that he wasn't qualify for medical waiver for citizenship because maybe some legal issues that he wasn't aware of. Patient expressed frustration and feeling hopeless. Patient reports he got lay-off from his job a month ago. Patient agreed to come see CCRN in person on 11/29/2022 at 2:00pm to discuss what's the next step he needs to take or consult with CCSW if needed. Patient is on wait list at [employer] [place] [employer] [place] to assist him apply for citizenship. Patient's MHP has been assisting him with citizenship process. Instructed patient to call 911 with active suicidal thoughts and when to call the clinic or providers with worsening suicidal thoughts. Patient has low literacy and it takes extra time to explain things to him. CCRN needs to use simple Radha words or sentences with patient otherwise he gets confused very easily.     Care Gaps:    Health Maintenance Due   Topic Date Due     EYE EXAM  07/10/2021       11/25/2022 with eye doctor.     Outreach Frequency: 6 weeks    Plan:   1) Patient will attend appt with CCRN in person on 11/29/22 at 2pm.

## 2022-12-01 ENCOUNTER — ALLIED HEALTH/NURSE VISIT (OUTPATIENT)
Dept: NURSING | Facility: CLINIC | Age: 42
End: 2022-12-01
Payer: COMMERCIAL

## 2022-12-01 DIAGNOSIS — Z71.89 COMPLEX CARE COORDINATION: Primary | ICD-10-CM

## 2022-12-01 DIAGNOSIS — R41.3 MEMORY DEFICITS: Primary | ICD-10-CM

## 2022-12-01 PROCEDURE — 99207 PR NO CHARGE LOS: CPT
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
## 2022-12-06 ENCOUNTER — ALLIED HEALTH/NURSE VISIT (OUTPATIENT)
Dept: NURSING | Facility: CLINIC | Age: 42
End: 2022-12-06
Payer: COMMERCIAL

## 2022-12-06 DIAGNOSIS — Z71.89 COMPLEX CARE COORDINATION: Primary | ICD-10-CM

## 2022-12-06 PROCEDURE — 99207 PR NO CHARGE LOS: CPT
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
## 2022-12-06 NOTE — LETTER
[facility]  Patient Centered Plan of Care  About Me:        Patient Name:  Pablo Russo    YOB: 1980  Age:         42 year old   [place] MRN:    [id number] Telephone Information:  Home Phone [phone]   Mobile [phone]       Address:  [address] [place] [address] Email address:  No e-mail address on record      Emergency Contact(s)    Name Relationship Lgl Grd Work Phone Home Phone Mobile Phone           Primary language:  Radha     needed? Yes   [place] [employer]:  [phone] op. 1  Other communication barriers:Language barrier; Lack of coping    Preferred Method of Communication:     Current living arrangement: I live in a private home    Mobility Status/ Medical Equipment: Independent        Health Maintenance  Health Maintenance Reviewed: Due/Overdue       My Access Plan  Medical Emergency 911   Primary Clinic Line [facility] [phone]   24 Hour Appointment Line [phone] or  [phone]) (toll-free)   24 Hour Nurse Line [phone] (toll-free)   Preferred Urgent Care [facility] [phone]     Preferred Hospital [facility]  [phone]     Preferred Pharmacy [facility] - [place], [place] - [address]     Behavioral Health Crisis Line The National Suicide Prevention Lifeline at 1-923.732.3199 or Text/Call 458             My Care Team Members  Patient Care Team       Relationship Specialty Notifications Start End    Peggy Ortiz MD PCP - General   6/15/20     Phone: [phone] Fax: [phone]         [address] [place] [address]    Charlene Ovalle, PharmD Pharmacist Pharmacist  5/20/21     Phone: [phone]          [facility] [address] [place] [address]    Peggy Ortiz MD Assigned PCP   6/16/21     Phone: [phone] Fax: [phone]         [address] [place]  [address]    Glenys Lantigua, RN Lead Care Coordinator Primary Care - CC Admissions 3/10/22     Eduardo Dilan Community Health Worker Primary Care - CC Admissions 3/10/22     Phone: [phone] Fax: [phone]         [address] [facility] [address] [facility] [address]    Peggy Ortiz MD Referring Physician Family Medicine  9/16/22     Phone: [phone] Fax: [phone]         [address] [facility] [address] [place] [address]    Yissel Celeste MD MD Ophthalmology  9/16/22     Phone: [phone] Fax: [phone]         [address] [facility] [address]    [place] - care coordinator with [facility]    10/13/22     Phone: [phone]                 My Care Plans  Self Management and Treatment Plan  Care Plan       Action Plans on File:                       Advance Care Plans/Directives Type:   No data recorded    My Medical and Care Information  Problem List   Patient Active Problem List   Diagnosis     Type 2 diabetes mellitus with hyperglycemia, with long-term current use of insulin (H)     Insomnia     Lump of skin     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    Care Coordination Start Date: No linked episodes   Frequency of Care Coordination: 6 weeks     Form Last Updated: 12/06/2022

## 2022-12-13 ENCOUNTER — PATIENT OUTREACH (OUTPATIENT)
Dept: CARE COORDINATION | Facility: CLINIC | Age: 42
End: 2022-12-13

## 2022-12-13 NOTE — PROGRESS NOTES
Clinic Care Coordination Contact  [facility]/Voicemail    Clinical Data: Care Coordinator Outreach  Outreach attempted x 1. CHW called patient 3x and unable to leave a voice mail for patient to return call due voice mail not valid. CHW also called patient's [employer] worker for update and left a voice mail to call back.      Plan: Care Coordinator will try to reach patient again in two weeks.

## 2022-12-14 ENCOUNTER — OFFICE VISIT (OUTPATIENT)
Dept: FAMILY MEDICINE | Facility: CLINIC | Age: 42
End: 2022-12-14
Payer: COMMERCIAL

## 2022-12-14 ENCOUNTER — PATIENT OUTREACH (OUTPATIENT)
Dept: CARE COORDINATION | Facility: CLINIC | Age: 42
End: 2022-12-14

## 2022-12-14 ENCOUNTER — TELEPHONE (OUTPATIENT)
Dept: FAMILY MEDICINE | Facility: CLINIC | Age: 42
End: 2022-12-14

## 2022-12-14 VITALS
OXYGEN SATURATION: 98 % | TEMPERATURE: 98 F | RESPIRATION RATE: 18 BRPM | WEIGHT: 122.5 LBS | DIASTOLIC BLOOD PRESSURE: 77 MMHG | SYSTOLIC BLOOD PRESSURE: 114 MMHG | BODY MASS INDEX: 22.41 KG/M2 | HEART RATE: 94 BPM

## 2022-12-14 DIAGNOSIS — E11.65 TYPE 2 DIABETES MELLITUS WITH HYPERGLYCEMIA, WITH LONG-TERM CURRENT USE OF INSULIN (H): Primary | ICD-10-CM

## 2022-12-14 DIAGNOSIS — J01.00 ACUTE NON-RECURRENT MAXILLARY SINUSITIS: ICD-10-CM

## 2022-12-14 DIAGNOSIS — Z79.4 TYPE 2 DIABETES MELLITUS WITH HYPERGLYCEMIA, WITH LONG-TERM CURRENT USE OF INSULIN (H): Primary | ICD-10-CM

## 2022-12-14 PROCEDURE — 99213 OFFICE O/P EST LOW 20 MIN: CPT | Performed by: FAMILY MEDICINE

## 2022-12-14 RX ORDER — CEPHALEXIN 500 MG/1
500 CAPSULE ORAL 3 TIMES DAILY
Qty: 21 CAPSULE | Refills: 0 | Status: SHIPPED | OUTPATIENT
Start: 2022-12-14 | End: 2022-12-15

## 2022-12-14 NOTE — LETTER
December 14, 2022      Pablo Russo  [address] [place] [address]        To Whom It May Concern:    Pablo Russo  was seen on 12/14/2022 .  Please excuse him  until 12/18/2022 due to illness.        Sincerely,        Sulma Cotter MD

## 2022-12-14 NOTE — TELEPHONE ENCOUNTER
Carlos A calling from [facility] to report that Rx for Amoxicillin is not covered by insurance.  Asking for replacement using one of the following:    Cephalexin or Azithromycin.  Carlos A notes the Cephalexin would be best alternative based on patient insurance.    Any questions, Carlos A can be reached at [phone].

## 2022-12-14 NOTE — PROGRESS NOTES
Clinic Care Coordination Contact    Community Health Worker Follow Up    Care Gaps:   There are no preventive care reminders to display for this patient.  Care Plan:   Care Plan: Neuro-psych Eval Completed 10/13/2022    Problem: Neuro-psych Evaluation  Resolved 10/13/2022    Goal: I want to complete a Neuropsychological assessment in the next 6 months so that I can better understand what supportive services I may want to pursue.  Completed 10/13/2022    Start Date: 5/10/2022 Expected End Date: 11/10/2022    Recent Progress: 20%    Priority: High    Note:     oal Statement: I want to complete a Neuropsychological assessment in the next 6 months so that I can better understand what supportive services I may want to pursue.    Date Goal set: 5/10/2022  Barriers: language barrier  Strengths: Willing to schedule  Date to Achieve By: 11/10/2022  Patient expressed understanding of goal: Yes    Action steps to achieve this goal:  1. I will attend my appointment for psych evaluation as scheduled at [facility] on 9/15/2022 at 9:30 AM.   2. I will update [facility] team regarding this goal.     Note: Patient is scheduled for evaluation at [facility] on 9/15/2022 at 9:30 AM.  ( Completed)    [facility], [phone], [email or web address]   [address]  [place] [address]                    Care Plan: Medication compliance     Problem: Med set up and med teaching     Goal: Patient will attend and follow-up with [facility] in the next 90 days for med set up and med teaching.     Start Date: 12/6/2022 Expected End Date: 3/6/2023    This Visit's Progress: 30% Recent Progress: 20%    Note:     Barriers: language barrier, non-compliance  Strengths: Agrees to work on goal  Patient expressed understanding of goal: Yes    Action steps to achieve this goal  1. I will meet with the [facility] RN in the clinic on 12/27/2022 at 9am. [facility] worker will provide transportation.   2. I will take my medications as prescribed and  as they are set up by [facility].  3. I/daughter/son will call the [facility] RN with concerns or questions.     Goal update: 12/14/2022                    Intervention and Education during outreach:   -Patient is now working full time and living with brother.  -Patient paid off all traffic violations fees and cleared records.   -Patient is scheduled with [facility] on 1/03/2023 but unclear if it's for TBI, general neurology or for citizenship waiver. [facility] worker is aware of this appointment and will provide supports as patient will need.      [facility] Next Outreach: In one month.

## 2022-12-14 NOTE — PROGRESS NOTES
OUTPATIENT VISIT NOTE                                                   Date of Visit: 12/14/2022     Chief Complaint   Patient presents with:  Cough: X 2 week. Cough. Runny nose. Headache. Fever. No chills or bodyache. Uncomfortable on chest. Stuffy in chest.            History of Present Illness   Pablo Russo is a 42 year old male with  by phone c/o coughing for the last two weeks.  Has had runny nose.   Some headache.  Has felt warm.  No shortness of breath   Chest discomfort.    Has used over the counter meds  Has some amoxicillin that he took.           MEDICATIONS   Current Outpatient Medications   Medication     amoxicillin-clavulanate (AUGMENTIN) 875-125 MG tablet     atorvastatin (LIPITOR) 20 MG tablet     celecoxib (CELEBREX) 200 MG capsule     metFORMIN (GLUCOPHAGE) 1000 MG tablet     mirtazapine (REMERON) 7.5 MG tablet     Alcohol Swabs (ALCOHOL WIPES) 70 % PADS     clotrimazole (LOTRIMIN) 1 % external cream     cyclobenzaprine (FLEXERIL) 10 MG tablet     diclofenac (VOLTAREN) 1 % topical gel     insulin glargine (LANTUS SOLOSTAR) 100 UNIT/ML pen     insulin pen needle (32G X 4 MM) 32G X 4 MM miscellaneous     olopatadine (PATANOL) 0.1 % ophthalmic solution     omeprazole (PRILOSEC) 20 MG DR capsule     ondansetron (ZOFRAN ODT) 4 MG ODT tab     QUEtiapine (SEROQUEL) 100 MG tablet     No current facility-administered medications for this visit.         SOCIAL HISTORY   Social History     Tobacco Use     Smoking status: Never     Smokeless tobacco: Never   Substance Use Topics     Alcohol use: Yes           Physical Exam   Vitals:    12/14/22 1253   BP: 114/77   BP Location: Right arm   Patient Position: Sitting   Cuff Size: Adult Regular   Pulse: 94   Resp: 18   Temp: 98  F (36.7  C)   TempSrc: Oral   SpO2: 98%   Weight: 55.6 kg (122 lb 8 oz)        GENERAL:   Alert. Oriented. Head congestion  EYES: Clear  HENT:  Ears: R TM pearly gray. Normal landmarks. L TM pearly gray.  Normal landmarks  Nose:  Clear.  Sinuses: tender over both maxillary sinuses.  Oropharynx:  No erythema. No exudate.  NECK: Supple. No adenopathy.  LUNGS: Clear to ascultation.  No crackles.  No wheezing  HEART: RRR  SKIN:  No rash.          Assessment and Plan     Type 2 diabetes mellitus with hyperglycemia, with long-term current use of insulin (H)  Taken into consideration    Acute non-recurrent maxillary sinusitis  Antibiotic.  Tylenol as needed.  Stop amoxicillin.  - amoxicillin-clavulanate (AUGMENTIN) 875-125 MG tablet  Dispense: 14 tablet; Refill: 0       Work note given            Discussed signs / symptoms that warrant urgent / emergent medical attention.     Recheck if worsening or not improving.       Sulma Cotter MD          Pertinent History     The following portions of the patient's history were reviewed and updated as appropriate: allergies, current medications, past family history, past medical history, past social history, past surgical history and problem list.

## 2022-12-14 NOTE — TELEPHONE ENCOUNTER
Used  to call patient. Patient has picked up new Rx and will take as directed. No questions.    Adrian Mohamud MA on 12/14/2022 at 4:42 PM

## 2022-12-15 ENCOUNTER — APPOINTMENT (OUTPATIENT)
Dept: RADIOLOGY | Facility: HOSPITAL | Age: 42
End: 2022-12-15
Attending: EMERGENCY MEDICINE
Payer: COMMERCIAL

## 2022-12-15 ENCOUNTER — HOSPITAL ENCOUNTER (EMERGENCY)
Facility: HOSPITAL | Age: 42
Discharge: HOME | End: 2022-12-15
Attending: EMERGENCY MEDICINE | Admitting: EMERGENCY MEDICINE
Payer: COMMERCIAL

## 2022-12-15 ENCOUNTER — TELEPHONE (OUTPATIENT)
Dept: FAMILY MEDICINE | Facility: CLINIC | Age: 42
End: 2022-12-15

## 2022-12-15 VITALS
HEIGHT: 62 IN | BODY MASS INDEX: 25.76 KG/M2 | HEART RATE: 90 BPM | SYSTOLIC BLOOD PRESSURE: 133 MMHG | WEIGHT: 140 LBS | TEMPERATURE: 98.4 F | RESPIRATION RATE: 16 BRPM | DIASTOLIC BLOOD PRESSURE: 78 MMHG | OXYGEN SATURATION: 98 %

## 2022-12-15 DIAGNOSIS — J01.00 ACUTE NON-RECURRENT MAXILLARY SINUSITIS: ICD-10-CM

## 2022-12-15 DIAGNOSIS — B33.8 RSV INFECTION: ICD-10-CM

## 2022-12-15 LAB
DEPRECATED S PYO AG THROAT QL EIA: NEGATIVE
FLUAV RNA SPEC QL NAA+PROBE: NEGATIVE
FLUBV RNA RESP QL NAA+PROBE: NEGATIVE
GROUP A STREP BY PCR: NOT DETECTED
RSV RNA SPEC NAA+PROBE: POSITIVE
SARS-COV-2 RNA RESP QL NAA+PROBE: NEGATIVE

## 2022-12-15 PROCEDURE — 99284 EMERGENCY DEPT VISIT MOD MDM: CPT | Mod: 25

## 2022-12-15 PROCEDURE — 87651 STREP A DNA AMP PROBE: CPT | Performed by: EMERGENCY MEDICINE

## 2022-12-15 PROCEDURE — 250N000013 HC RX MED GY IP 250 OP 250 PS 637: Performed by: EMERGENCY MEDICINE

## 2022-12-15 PROCEDURE — 71046 X-RAY EXAM CHEST 2 VIEWS: CPT

## 2022-12-15 PROCEDURE — 87637 SARSCOV2&INF A&B&RSV AMP PRB: CPT | Performed by: EMERGENCY MEDICINE

## 2022-12-15 PROCEDURE — C9803 HOPD COVID-19 SPEC COLLECT: HCPCS

## 2022-12-15 RX ORDER — IBUPROFEN 200 MG
400 TABLET ORAL ONCE
Status: COMPLETED | OUTPATIENT
Start: 2022-12-15 | End: 2022-12-15

## 2022-12-15 RX ORDER — CEPHALEXIN 500 MG/1
500 CAPSULE ORAL 3 TIMES DAILY
Qty: 21 CAPSULE | Refills: 0 | Status: SHIPPED | OUTPATIENT
Start: 2022-12-15 | End: 2022-12-28

## 2022-12-15 RX ADMIN — IBUPROFEN 400 MG: 200 TABLET, FILM COATED ORAL at 07:29

## 2022-12-15 ASSESSMENT — ENCOUNTER SYMPTOMS
RHINORRHEA: 1
VOMITING: 0
SHORTNESS OF BREATH: 0
BLOOD IN STOOL: 0
ABDOMINAL PAIN: 0
HEADACHES: 1
DIARRHEA: 0
COUGH: 1
DYSURIA: 0

## 2022-12-15 ASSESSMENT — ACTIVITIES OF DAILY LIVING (ADL): ADLS_ACUITY_SCORE: 33

## 2022-12-15 NOTE — ED TRIAGE NOTES
Pt reports onset of fever, runny nose, headache and cough 2 weeks ago. Pt was seen in a clinic and was diagnosed with a sinus infection and prescribed an antibiotic. Pt also reports decreased appetite for 2 weeks. Pt reports thick bloody mucus from his nose. He is not feeling better after taking the antibiotics for 5 doses. Pt does not speak English. Radha  was used for triage.

## 2022-12-15 NOTE — DISCHARGE INSTRUCTIONS
Read and follow the discharge instructions.    Have an infection called RSV.  This is a virus and the treatment is symptomatic.    You May use a Kelsey pot to irrigate your nose.    Make sure you have a humidifier to self with a nasal congestion    Take lots of liquids stay well-hydrated    Call your primary care doctor to make a follow-up appointment    Return for worsening symptoms.

## 2022-12-15 NOTE — TELEPHONE ENCOUNTER
Yes, that sounds good. Chart reviewed.   I re-sent the medication so it should be fine, they dont need to re-send it.     Peggy Ortiz MD

## 2022-12-15 NOTE — TELEPHONE ENCOUNTER
Writer spoke to patient regarding Dr. Ortiz's message below. Provider message relayed to Aleksandr.    Aleksandr stated that because patient is part of the [employer], if patient ever needs to see anyone/or any specialist, he would need a referral from PCP to see them. Aleksandr will be faxing over Referral Form to Dr. Ortiz to have it in place in case they may need it in the future.    Aleksandr states, clinic is welcome to call [employer] [facility] with [employer] anytime at [phone].    MERLY MendezN, RN   [facility]

## 2022-12-15 NOTE — ED PROVIDER NOTES
REFERRING PHYSICIAN: Paolo Bishop MD.     CONSULTING PHYSICIAN: Gunnar Sexton DO.    CHIEF COMPLAINT: Nausea, upper extremity aching.    HISTORY OF PRESENT ILLNESS: The patient is a 71 y.o. male with history significant for HTN, hyperlipidemia, CHF, 35 pack year hx of smoking (quit ) and diverticulitis s/p colon resection.  He arrived to the ER on the evening of  with complaints of nausea and aching in his upper extremities x 24 hours.  Took BP at home and was hypertensive at 190/120.  Troponin and CKMB elevated in the ER.  EKG showed PACs and T wave inversion in inferior leads.  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  He coded in the cath lab and Impella device was placed.   I was asked to consult.     PAST MEDICAL HISTORY:   Past Medical History:   Diagnosis Date   • Diverticulitis    • Hyponatremia    • Pulmonary nodule        PAST SURGICAL HISTORY:   Past Surgical History:   Procedure Laterality Date   • COLON RESECTION      diverticulitis, 1980s       FAMILY HISTORY:   Family History   Problem Relation Age of Onset   • Heart Disease Mother    • Diabetes Mother    • Heart Disease Father         SOCIAL HISTORY:   Social History     Socioeconomic History   • Marital status:      Spouse name: Not on file   • Number of children: Not on file   • Years of education: Not on file   • Highest education level: Not on file   Occupational History   • Not on file   Social Needs   • Financial resource strain: Not on file   • Food insecurity     Worry: Not on file     Inability: Not on file   • Transportation needs     Medical: Not on file     Non-medical: Not on file   Tobacco Use   • Smoking status: Former Smoker     Packs/day: 1.00     Years: 35.00     Pack years: 35.00     Quit date: 2007     Years since quittin.1   • Smokeless tobacco: Never Used   Substance and Sexual Activity   • Alcohol use: No     Alcohol/week: 0.0 oz   • Drug  EMERGENCY DEPARTMENT ENCOUNTER      NAME: Pablo Russo  AGE: 42 year old male  YOB: 1980  MRN: [id number]  EVALUATION DATE & TIME: 12/15/2022  5:24 AM    PCP: Peggy Ortiz    ED PROVIDER: Agustina Sims M.D.      CHIEF COMPLAINT     Chief Complaint   Patient presents with     Cough     Fever     Headache     Nasal Congestion         FINAL IMPRESSION:     1. RSV infection          MEDICAL DECISION MAKING:       Pertinent Labs & Imaging studies reviewed. (See chart for details)    42 year old male presents to the Emergency Department for evaluation of nasal congestion    ED Course as of 12/15/22 0804   Thu Dec 15, 2022   0627 Karan Bentley 2-year-old male who presents complaining of nasal congestion.  Denies chest pain shortness of breath no vomiting no diarrhea no abdominal pain.  Patient interview with .   0627 Semination he is well-appearing no respiratory distress bilateral pearly white TMs clearly nasal congestion.  Throat uvula is midline no exudate neck is supple does have lymphadenopathy submandibular but the trachea is midline and there is no meningismus cardiopulmonary regular rhythm clear breath sounds abdomen is soft extremities without edema.   0628 Differential diagnosis for initial congestion sore throat include but not limited to viral etiology secondary to influenza COVID strep   0712 With  patient states he has been having nasal congestion and cough for the last 2 weeks no chest pain or shortness of breath no vomiting or diarrhea.  He was seen at the [facility] clinic on the 14th that was yesterday and was prescribed Augmentin.  He supposed to take 1 pill twice a day and he said he already had taken fine and does not feel any better.  What  patient instructed that he is already taking too many pills.   0712 No recent travel he is diabetic states his sugars have been doing fine.   0712 Examination patient is well-appearing in no respiratory distress obvious nasal  congestion and some dried blood.  No respiratory distress well-appearing.  We will do a chest x-ray given the cough and history for diabetes.   0713 Lab work reveals negative strep negative influenza but positive for RSV.  He is not hypoxic no respiratory distress no consolidation on chest x-ray.  Discussed with patient symptomatic treatment for his next of congestion with a Kelsey pot and humidified air.   0713 I did not prescribe antibiotics. The prescriber  was his primary care doctor but I instructed that he is not taking it as they were intended to be taken.   0759 X-ray no acute pneumonia.  Discussed with patient symptomatic treatment follow-up family care doctor and return precautions.   0759 Nickel impression and decision making 42-year-old male history of diabetes here with nasal congestion.  This is what bothering him the most he was seen by his primary care doctor yesterday was prescribed Augmentin.  He had taking 5 pills instead of the 2 that were prescribed still he still congested.  Some nosebleed but no hemoptysis well-appearing on examination appears congested but no respiratory distress.  Cardiopulmonary no acute abnormalities.  He is positive for RSV likely causing his symptoms.  No hypoxia.  States his glucose has been normal he is afebrile.  Chest x-ray no consolidation.  Discussed with patient symptomatic treatment with humidifier and Morley pot lots of liquids.  And return precautions.  Discharged ambulatory in stable condition.       Vital Signs: Reviewed  EKG: None  Imaging: Chest x-ray to consolidation  Home Meds: augmentin  ED meds/abx: motrin  Fluids: oral    Labs  RSVv +    Medical Decision Making    History:    Supplemental history from: Documented in HPI, if applicable    External Record(s) reviewed: Documented in HPI, if applicable.    Work Up:    Chart documentation includes differential considered and any EKGs or imaging interpreted by provider.    In additional to work up  use: No   • Sexual activity: Yes     Partners: Male   Lifestyle   • Physical activity     Days per week: Not on file     Minutes per session: Not on file   • Stress: Not on file   Relationships   • Social connections     Talks on phone: Not on file     Gets together: Not on file     Attends Jainism service: Not on file     Active member of club or organization: Not on file     Attends meetings of clubs or organizations: Not on file     Relationship status: Not on file   • Intimate partner violence     Fear of current or ex partner: Not on file     Emotionally abused: Not on file     Physically abused: Not on file     Forced sexual activity: Not on file   Other Topics Concern   • Not on file   Social History Narrative   • Not on file       ALLERGIES:   No Known Allergies     CURRENT MEDICATIONS:     Current Facility-Administered Medications:   •  EPINEPHRINE 1 MG/10ML INJ SOSY, , , ,   •  POTASSIUM CHLORIDE 10 MEQ/100ML IV SOLN, , , ,   •  heparin infusion 25,000 units in 500 mL 0.45% NACL, 0-30 Units/kg/hr, Intravenous, Continuous, Elio Horne M.D., Last Rate: 16.3 mL/hr at 09/24/20 1523, 12 Units/kg/hr at 09/24/20 1523  •  heparin injection 2,000 Units, 30 Units/kg, Intravenous, PRN, Elio Horne M.D.  •  FENTANYL CITRATE (PF) 0.05 MG/ML INJ SOLN (WRAPPED), , , ,   •  MIDAZOLAM HCL 2 MG/2ML INJ SOLN (WRAPPED), , , ,   •  HEPARIN 1000 UNITS/ML OR USE ONLY, , , ,   •  HEPARIN 1000 UNITS/ML OR USE ONLY, , , ,   •  EPINEPHRINE 1 MG/10ML INJ SOSY, , , ,   •  potassium chloride (KCL) ivpb 10 mEq, 10 mEq, Intravenous, Q HOUR, Misha Gleason Jr., D.O., Last Rate: 100 mL/hr at 09/24/20 1618, 10 mEq at 09/24/20 1618  •  DOPamine 1600 mcg/mL in D5W premix, 0-20 mcg/kg/min, Intravenous, Continuous, Warren Sexton M.D., Last Rate: 12.7 mL/hr at 09/24/20 1622, 5 mcg/kg/min at 09/24/20 1622  •  EPINEPHrine (Adrenalin) infusion 4 mg/250 mL (premix), 0-20 mcg/min, Intravenous, Continuous, Misha Gleason Jr., D.O., Last Rate:  37.5 mL/hr at 09/24/20 1620, 10 mcg/min at 09/24/20 1620  •  senna-docusate (PERICOLACE or SENOKOT S) 8.6-50 MG per tablet 2 Tab, 2 Tab, Oral, BID **AND** polyethylene glycol/lytes (MIRALAX) PACKET 1 Packet, 1 Packet, Oral, QDAY PRN **AND** magnesium hydroxide (MILK OF MAGNESIA) suspension 30 mL, 30 mL, Oral, QDAY PRN **AND** bisacodyl (DULCOLAX) suppository 10 mg, 10 mg, Rectal, QDAY PRN, Yoli Cardenas M.D.  •  acetaminophen (TYLENOL) tablet 650 mg, 650 mg, Oral, Q6HRS PRN, Yoli Cardenas M.D.  •  enalaprilat (VASOTEC) injection 1.25 mg, 1.25 mg, Intravenous, Q6HRS PRN, Yoli Cardenas M.D.  •  aspirin EC (ECOTRIN) tablet 81 mg, 81 mg, Oral, DAILY, Yoli Cardenas M.D., 81 mg at 09/24/20 0518  •  metoprolol (LOPRESSOR) tablet 25 mg, 25 mg, Oral, TWICE DAILY, Yoli Cardenas M.D., 25 mg at 09/24/20 0518  •  lisinopril (PRINIVIL) tablet 5 mg, 5 mg, Oral, Q DAY, Yoli Cardenas M.D., 5 mg at 09/24/20 0518  •  morphine (pf) 4 mg/mL injection 2-4 mg, 2-4 mg, Intravenous, Q5 MIN PRN, Yoli Cardenas M.D., 4 mg at 09/24/20 0133  •  ondansetron (ZOFRAN) syringe/vial injection 4 mg, 4 mg, Intravenous, Q4HRS PRN, Yoli Cardenas M.D.  •  ondansetron (ZOFRAN ODT) dispertab 4 mg, 4 mg, Oral, Q4HRS PRN, Yoli Cardenas M.D., 4 mg at 09/24/20 0920     LABS REVIEWED:  Lab Results   Component Value Date/Time    SODIUM 126 (L) 09/24/2020 03:28 AM    POTASSIUM 3.9 09/24/2020 03:28 AM    CHLORIDE 83 (L) 09/24/2020 03:28 AM    CO2 31 09/24/2020 03:28 AM    GLUCOSE 120 (H) 09/24/2020 03:28 AM    BUN 13 09/24/2020 03:28 AM    CREATININE 0.66 09/24/2020 03:28 AM      Lab Results   Component Value Date/Time    PROTHROMBTM 13.1 09/24/2020 07:38 AM    INR 0.96 09/24/2020 07:38 AM      Lab Results   Component Value Date/Time    WBC 11.7 (H) 09/24/2020 03:28 AM    RBC 5.46 09/24/2020 03:28 AM    HEMOGLOBIN 17.3 09/24/2020 03:28 AM    HEMATOCRIT 50.8 09/24/2020 03:28 AM    MCV 93.0 09/24/2020 03:28 AM    MCH 31.7  documented, I considered the following work up: See chart documentation, if applicable.    External consultation:    Discussion of management with another provider: See chart documentation, if applicable    Complicating factors:    Care impacted by chronic illness: Diabetes    Care affected by social determinants of health: N/A    Disposition considerations: Discharge with symptomatic treatment.          Review of Previous Records  Office visits 12/14/2022  History of Present Illness   Pablo Russo is a 42 year old male with  by phone c/o coughing for the last two weeks.  Has had runny nose.   Some headache.  Has felt warm.  No shortness of breath   Chest discomfort.     Has used over the counter meds  Has some amoxicillin that he took.        Type 2 diabetes mellitus with hyperglycemia, with long-term current use of insulin (H)  Taken into consideration     Acute non-recurrent maxillary sinusitis  Antibiotic.  Tylenol as needed.  Stop amoxicillin.  - amoxicillin-clavulanate (AUGMENTIN) 875-125 MG tablet  Dispense: 14 tablet; Refill: 0        Work note given      Consults      ED COURSE     6:10 AM I met with the patient, obtained history, performed an initial exam, and discussed options and plan for diagnostics and treatment here in the ED.  7:10 AM Checked in on and updated patient.     At the conclusion of the encounter I discussed the results of all of the tests and the disposition. The questions were answered. The patient acknowledged understanding and was agreeable with the care plan.         MEDICATIONS GIVEN IN THE EMERGENCY:     Medications   ibuprofen (ADVIL/MOTRIN) tablet 400 mg (400 mg Oral Given 12/15/22 0729)       NEW PRESCRIPTIONS STARTED AT TODAY'S ER VISIT     New Prescriptions    No medications on file          =================================================================    HPI     Patient information was obtained from: patient     Use of : Yes (Phone) - Radha TUBBS  "09/24/2020 03:28 AM    MCHC 34.1 09/24/2020 03:28 AM    RDW 44.3 09/24/2020 03:28 AM    PLATELETCT 232 09/24/2020 03:28 AM    MPV 10.1 09/24/2020 03:28 AM    NEUTSPOLYS 76.10 (H) 09/24/2020 03:28 AM    LYMPHOCYTES 14.70 (L) 09/24/2020 03:28 AM    MONOCYTES 8.10 09/24/2020 03:28 AM    EOSINOPHILS 0.50 09/24/2020 03:28 AM    BASOPHILS 0.20 09/24/2020 03:28 AM        IMAGING REVIEWED AND INTERPRETED:    ECHOCARDIOGRAM: none available.    ANGIOGRAM: 95 % left main, 99% proximal RCA on my read    REVIEW OF SYSTEMS:   Review of Systems   Unable to perform ROS: Intubated       PHYSICAL EXAMINATION:   Physical Exam   Constitutional:   Patient with no cardiac activity on arrival. Patient intubated.    Nursing note and vitals reviewed.    CONSTITUTIONAL:  /84   Pulse 68   Temp 36.6 °C (97.9 °F) (Temporal)   Resp (!) 10   Ht 1.727 m (5' 8\")   Wt 67.9 kg (149 lb 11.1 oz)   SpO2 90%         IMPRESSION:  Critical coronary artery disease, cardiogenic shock      PLAN:  Unfortunately, after discussing the patient with cardiology team, he arrested and was in cardiogenic shock. I was called emergently to cath lab, as they worked to  open the left main and place an impella, during resuscitative efforts. He currently has little to no cardiac activity. Patient is to be supported with impella and aggressive critical care. His prognosis is extremely poor. He would not survive a surgical intervention at this time, and is unlikely to survive. Family has been notified by social work team. I will be available as needed.       Sincerely,       Gunnar Sexton DO.    I,  Gunnar Sexton DO performed a substantial portion of the EM visit face-to-face with the same patient on the same date of service with Carmen Cisneros PA-C. I was personally involved in reviewing and interpreting the films and conducted elements of the history and physical exam. I performed all of the medical decision making for the patient.      " Karan is a 42 year old male who presents by walk in at 4:57 AM for evaluation of congestion.     For the past 2 weeks, the patient has been experiencing nasal congestion, headache, runny nose, and nonbloody cough. Yesterday, the patient was seen at [place] and was prescribed Augmentin for his symptoms. He was instructed to take 1 pill 2 times daily. However, the patient has already taken 5 pills since he picked up the prescription, because his symptoms were not getting better. No recent travel. He has a history of diabetes. Patient denies smoking and alcohol use.     The patient denies epistaxis, abdominal pain, chest pain, shortness of breath, leg pain, leg swelling, vomiting, diarrhea, hematochezia, dysuria, or any other complaints at this time.       REVIEW OF SYSTEMS   Review of Systems   HENT: Positive for congestion and rhinorrhea. Negative for nosebleeds.    Respiratory: Positive for cough (nonbloody). Negative for shortness of breath.    Cardiovascular: Negative for chest pain and leg swelling.   Gastrointestinal: Negative for abdominal pain, blood in stool, diarrhea and vomiting.   Genitourinary: Negative for dysuria.   Musculoskeletal:        Negative for leg pain.    Neurological: Positive for headaches.   All other systems reviewed and are negative.       PAST MEDICAL HISTORY:     Past Medical History:   Diagnosis Date     Immune to hepatitis B 2/12/2020 2/11/2020 - Immune from prior infection     Immune to hepatitis B 2/12/2020 2/11/2020 - Immune from prior infection       PAST SURGICAL HISTORY:   History reviewed. No pertinent surgical history.      CURRENT MEDICATIONS:   Alcohol Swabs (ALCOHOL WIPES) 70 % PADS  atorvastatin (LIPITOR) 20 MG tablet  celecoxib (CELEBREX) 200 MG capsule  cephALEXin (KEFLEX) 500 MG capsule  clotrimazole (LOTRIMIN) 1 % external cream  cyclobenzaprine (FLEXERIL) 10 MG tablet  diclofenac (VOLTAREN) 1 % topical gel  insulin glargine (LANTUS SOLOSTAR) 100 UNIT/ML  pen  insulin pen needle (32G X 4 MM) 32G X 4 MM miscellaneous  metFORMIN (GLUCOPHAGE) 1000 MG tablet  mirtazapine (REMERON) 7.5 MG tablet  olopatadine (PATANOL) 0.1 % ophthalmic solution  omeprazole (PRILOSEC) 20 MG DR capsule  ondansetron (ZOFRAN ODT) 4 MG ODT tab  QUEtiapine (SEROQUEL) 100 MG tablet         ALLERGIES:   No Known Allergies    FAMILY HISTORY:   History reviewed. No pertinent family history.    SOCIAL HISTORY:     Social History     Socioeconomic History     Marital status: Single     Number of children: 0   Tobacco Use     Smoking status: Never     Smokeless tobacco: Never   Vaping Use     Vaping Use: Never used   Substance and Sexual Activity     Alcohol use: Yes     Drug use: Not Currently     Sexual activity: Not Currently     Partners: Female   Social History Narrative    6/2020:    - [place] refugee. Born in [place]. [place] He is not yet a [place] citizen.  - Never ; no children.  - Established care with Dr. Ortiz at [facility] in June 2020.    FAMILY HISTORY  - His parents and siblings remain  in [place]    LIVING SITUATION  - Lives in a house with his grandparents, uncle, and several friends  - Household of 6    LANGUAGE(S) SPOKEN  - Radha    EMPLOYMENT  - Past employment:  at a restaurant  - Current employment: None    EDUCATION  - N one    Bahai  - Rastafari     Social Determinants of Health     Financial Resource Strain: Low Risk      Difficulty of Paying Living Expenses: Not very hard   Food Insecurity: No Food Insecurity     Worried About Running Out of Food in the Last Year: Never true     Ran Out of Food in the Last Year: Never true   Transportation Needs: No Transportation Needs     Lack of Transportation (Medical): No     Lack of Transportation (Non-Medical): No   Physical Activity: Insufficiently Active     Days of Exercise per Week: 5 days     Minutes of Exercise per Session: 10 min   Stress: Stress Concern Present     Feeling of Stress : To  "some extent   Social Connections: Moderately Integrated     Frequency of Communication with Friends and Family: More than three times a week     Frequency of Social Gatherings with Friends and Family: More than three times a week     Attends Evangelical Services: More than 4 times per year     Active Member of Clubs or Organizations: No     Attends Club or Organization Meetings: 1 to 4 times per year     Marital Status: Never    Intimate Partner Violence: Not At Risk     Fear of Current or Ex-Partner: No     Emotionally Abused: No     Physically Abused: No     Sexually Abused: No   Housing Stability: Low Risk      Unable to Pay for Housing in the Last Year: No     Number of Places Lived in the Last Year: 1     Unstable Housing in the Last Year: No       VITALS:   /78   Pulse 90   Temp 99.2  F (37.3  C) (Oral)   Resp 16   Ht 1.575 m (5' 2\")   Wt 63.5 kg (140 lb)   SpO2 98%   BMI 25.61 kg/m      PHYSICAL EXAM     Physical Exam  Vitals and nursing note reviewed. Exam conducted with a chaperone present.   Constitutional:       General: He is not in acute distress.     Appearance: Normal appearance. He is normal weight. He is not ill-appearing, toxic-appearing or diaphoretic.   HENT:      Nose: Congestion present.      Comments: Dried blood.  No septal hematoma  Musculoskeletal:      Cervical back: Normal range of motion and neck supple.   Neurological:      Mental Status: He is alert.         Physical Exam   Constitutional: Nontoxic. In no distress.     Head: Atraumatic.     Nose: Nose normal.     Mouth/Throat: Oropharynx is clear and moist.     Eyes: Slight jaundice. EOM are normal. Pupils are equal, round, and reactive to light.     Ears: Bilateral pearly white TMs.     Neck: Normal range of motion. Neck supple.     Cardiovascular: Normal rate, regular rhythm and normal heart sounds.      Pulmonary/Chest: Normal effort  and breath sounds normal.     Abdominal: n/a    Musculoskeletal: Normal range of " motion.  No lower extremity edema.     Neurological: n/a    Lymphatics: n/a    : n/a    Skin: Skin is warm and dry.     Psychiatric: Normal mood and affect. Behavior is normal.       LAB:     All pertinent labs reviewed and interpreted.  Labs Ordered and Resulted from Time of ED Arrival to Time of ED Departure   INFLUENZA A/B & SARS-COV2 PCR MULTIPLEX - Abnormal       Result Value    Influenza A PCR Negative      Influenza B PCR Negative      RSV PCR Positive (*)     SARS CoV2 PCR Negative     STREPTOCOCCUS A RAPID SCREEN W REFELX TO PCR - Normal    Group A Strep antigen Negative     GROUP A STREPTOCOCCUS PCR THROAT SWAB        RADIOLOGY:     Reviewed all pertinent imaging. Please see official radiology report.  Chest XR,  PA & LAT   Final Result   IMPRESSION: Negative chest.           EKG:       I have independently reviewed and interpreted the EKG(s) documented above.      PROCEDURES:     Procedures      I, Ludy Sainz, am serving as a scribe to document services personally performed by Dr. Sims based on my observation and the provider's statements to me. I, Agustina Sims MD attest that Ludy Sainz is acting in a scribe capacity, has observed my performance of the services and has documented them in accordance with my direction.    Agustina Sims M.D.  Emergency Medicine  [facility] EMERGENCY DEPARTMENT  [facility] [facility] [id number] [phone]  Dept: [phone]     Agustina Sims MD  12/15/22 0888

## 2022-12-15 NOTE — TELEPHONE ENCOUNTER
General Call    Contacts       Type Contact Phone/Fax    12/15/2022 11:58 AM CST Phone (Incoming) Aleksandr with [facility] (Other) [phone]        Reason for Call:  cephALEXin (KEFLEX) 500 MG capsule    What are your questions or concerns:  Aleksandr with [facility] calls state patient was prescribed medication by another provider within [facility] when patient is well aware patient is in the RRP. Will Dr. Ortiz put in a verbal referral for the provider to put in the refill?    Date of last appointment with provider: 12/14/22    Okay to leave a detailed message?: Yes at Other phone number:  [phone]

## 2022-12-20 ENCOUNTER — MEDICAL CORRESPONDENCE (OUTPATIENT)
Dept: HEALTH INFORMATION MANAGEMENT | Facility: CLINIC | Age: 42
End: 2022-12-20

## 2022-12-27 ENCOUNTER — ALLIED HEALTH/NURSE VISIT (OUTPATIENT)
Dept: NURSING | Facility: CLINIC | Age: 42
End: 2022-12-27
Payer: COMMERCIAL

## 2022-12-27 DIAGNOSIS — Z71.89 COMPLEX CARE COORDINATION: Primary | ICD-10-CM

## 2022-12-27 DIAGNOSIS — Z79.4 TYPE 2 DIABETES MELLITUS WITH HYPERGLYCEMIA, WITH LONG-TERM CURRENT USE OF INSULIN (H): Primary | ICD-10-CM

## 2022-12-27 DIAGNOSIS — E11.65 TYPE 2 DIABETES MELLITUS WITH HYPERGLYCEMIA, WITH LONG-TERM CURRENT USE OF INSULIN (H): Primary | ICD-10-CM

## 2022-12-27 PROCEDURE — 99207 PR NO CHARGE LOS: CPT

## 2022-12-27 RX ORDER — METFORMIN HCL 500 MG
500 TABLET, EXTENDED RELEASE 24 HR ORAL 2 TIMES DAILY WITH MEALS
Qty: 180 TABLET | Refills: 3 | Status: SHIPPED | OUTPATIENT
Start: 2022-12-27 | End: 2023-03-08 | Stop reason: DRUGHIGH

## 2022-12-27 NOTE — PROGRESS NOTES
Clinic Care Coordination Contact    Follow Up Progress Note      Assessment: CCRN met with patient today for MEV follow-up. Patient forgot to bring all his prescription bottles today. Patient states he's not taking Metformin or insuline because he gets really bad back pain when he takes it. Consulted with Marco - MTM and PCP. MTM recommended decreasing Metformin 500mg ER BID. PCP agreed and new order placed today. Patient has follow-up appt with PCP tomorrow.     Care Gaps:    There are no preventive care reminders to display for this patient.    Care Plans  Care Plan: Neuro-psych Eval Completed 10/13/2022    Problem: Neuro-psych Evaluation  Resolved 10/13/2022    Goal: I want to complete a Neuropsychological assessment in the next 6 months so that I can better understand what supportive services I may want to pursue.  Completed 10/13/2022    Start Date: 5/10/2022 Expected End Date: 11/10/2022    Recent Progress: 20%    Priority: High    Note:     oal Statement: I want to complete a Neuropsychological assessment in the next 6 months so that I can better understand what supportive services I may want to pursue.    Date Goal set: 5/10/2022  Barriers: language barrier  Strengths: Willing to schedule  Date to Achieve By: 11/10/2022  Patient expressed understanding of goal: Yes    Action steps to achieve this goal:  1. I will attend my appointment for psych evaluation as scheduled at [facility] on 9/15/2022 at 9:30 AM.   2. I will update [facility] team regarding this goal.     Note: Patient is scheduled for evaluation at [facility] on 9/15/2022 at 9:30 AM.  ( Completed)    [facility], [phone], [email or web address]   [id number]  [place] [address]                    Care Plan: Medication compliance     Problem: Med set up and med teaching     Goal: Patient will attend and follow-up with CCRN in the next 90 days for med set up and med teaching.     Start Date: 12/6/2022 Expected End Date:  3/6/2023    Recent Progress: 30%    Note:     Barriers: language barrier, non-compliance  Strengths: Agrees to work on goal  Patient expressed understanding of goal: Yes    Action steps to achieve this goal  1. I will meet with the CCC RN in the clinic on 12/27/2022 at 9am. ( Completed)  2. Patient will attend his follow-up appt with CCRN on 1/3/2023 at 9:00am.   3. I will take my medications as prescribed and as they are set up by CCRN.  4. I/daughter/son will call the CCC RN with concerns or questions.     Goal update: 12/14/2022                          Plan:   1) Patient will attend his follow-up appt with PCP tomorrow at 8:40am arrival time.   2) Patient will attend his MEV follow-up appt with CCRN on 1/3/2023 at 9am.   3) CCRN plans to call the pharmacy after patient saw PCP tomorrow with med refill.

## 2022-12-28 ENCOUNTER — OFFICE VISIT (OUTPATIENT)
Dept: FAMILY MEDICINE | Facility: CLINIC | Age: 42
End: 2022-12-28
Payer: COMMERCIAL

## 2022-12-28 VITALS
DIASTOLIC BLOOD PRESSURE: 80 MMHG | OXYGEN SATURATION: 98 % | TEMPERATURE: 98.1 F | HEIGHT: 62 IN | BODY MASS INDEX: 22.78 KG/M2 | WEIGHT: 123.8 LBS | RESPIRATION RATE: 20 BRPM | SYSTOLIC BLOOD PRESSURE: 130 MMHG | HEART RATE: 77 BPM

## 2022-12-28 DIAGNOSIS — E11.65 TYPE 2 DIABETES MELLITUS WITH HYPERGLYCEMIA, WITH LONG-TERM CURRENT USE OF INSULIN (H): Primary | ICD-10-CM

## 2022-12-28 DIAGNOSIS — M54.50 ACUTE BILATERAL LOW BACK PAIN WITHOUT SCIATICA: ICD-10-CM

## 2022-12-28 DIAGNOSIS — F51.01 PRIMARY INSOMNIA: ICD-10-CM

## 2022-12-28 DIAGNOSIS — Z79.4 TYPE 2 DIABETES MELLITUS WITH HYPERGLYCEMIA, WITH LONG-TERM CURRENT USE OF INSULIN (H): Primary | ICD-10-CM

## 2022-12-28 PROCEDURE — 99214 OFFICE O/P EST MOD 30 MIN: CPT | Performed by: FAMILY MEDICINE

## 2022-12-28 RX ORDER — MIRTAZAPINE 7.5 MG/1
7.5 TABLET, FILM COATED ORAL AT BEDTIME
Qty: 90 TABLET | Refills: 3 | Status: SHIPPED | OUTPATIENT
Start: 2022-12-28 | End: 2023-03-08

## 2022-12-28 RX ORDER — ACETAMINOPHEN 500 MG
1000 TABLET ORAL 3 TIMES DAILY PRN
Qty: 100 TABLET | Refills: 3 | Status: SHIPPED | OUTPATIENT
Start: 2022-12-28 | End: 2023-04-24

## 2022-12-28 RX ORDER — LANCETS
EACH MISCELLANEOUS
Qty: 100 EACH | Refills: 3 | Status: SHIPPED | OUTPATIENT
Start: 2022-12-28 | End: 2023-03-08

## 2022-12-28 RX ORDER — GLIPIZIDE 5 MG/1
5 TABLET, FILM COATED, EXTENDED RELEASE ORAL DAILY
Qty: 30 TABLET | Refills: 3 | Status: SHIPPED | OUTPATIENT
Start: 2022-12-28 | End: 2023-05-12

## 2022-12-28 RX ORDER — GLUCOSAMINE HCL/CHONDROITIN SU 500-400 MG
CAPSULE ORAL
Qty: 100 EACH | Refills: 3 | Status: SHIPPED | OUTPATIENT
Start: 2022-12-28 | End: 2023-10-19

## 2022-12-28 NOTE — PROGRESS NOTES
Assessment & Plan     Type 2 diabetes mellitus with hyperglycemia, with long-term current use of insulin (H)  Patient says he has significant back pain from insulin and metformin so he stopped his own medications a few weeks ago. Will not recheck a1c, because it will be high and does not change our plan. Discussed with ccc rn and MTM. Will decrease metformin, add glipizide, stop insulin per patient. He says he no longer has a meter which may be an issue because he just got one about a year and half ago.   - glipiZIDE (GLUCOTROL XL) 5 MG 24 hr tablet  Dispense: 30 tablet; Refill: 3  - blood glucose monitoring (NO BRAND SPECIFIED) meter device kit  Dispense: 1 kit; Refill: 0  - blood glucose (NO BRAND SPECIFIED) test strip  Dispense: 100 strip; Refill: 3  - blood glucose calibration (NO BRAND SPECIFIED) solution  Dispense: 1 each; Refill: 3  - thin (NO BRAND SPECIFIED) lancets  Dispense: 100 each; Refill: 3  - alcohol swab prep pads  Dispense: 100 each; Refill: 3    Primary insomnia  - mirtazapine (REMERON) 7.5 MG tablet  Dispense: 90 tablet; Refill: 3    Acute bilateral low back pain without sciatica  - acetaminophen (TYLENOL) 500 MG tablet  Dispense: 100 tablet; Refill: 3    Hep C.   Recheck labs next visit, unsure if he went to see GI yet.       Return in about 1 month (around 1/28/2023) for diabetes follow up.    Peggy Ortiz MD  [facility] is a 42 year old, presenting for the following health issues:  Diabetes (Pt want medication for back pain and sleep. )      History of Present Illness       Diabetes:   He presents for follow up of diabetes.  He is not checking blood glucose. He has no concerns regarding his diabetes at this time.  He is not experiencing numbness or burning in feet, excessive thirst, blurry vision, weight changes or redness, sores or blisters on feet.         He eats 2-3 servings of fruits and vegetables daily.He consumes 0 sweetened beverage(s)  "daily.He exercises with enough effort to increase his heart rate 9 or less minutes per day.  He exercises with enough effort to increase his heart rate 3 or less days per week.   He is taking medications regularly.       He did express to [facility] RN that he has a lot of abdominal pain and everything.     His back hurts when he takes his insulin and metformin.   He says his back still hurts.     Review of Systems   Constitutional, HEENT, cardiovascular, pulmonary, gi and gu systems are negative, except as otherwise noted.      Objective    /80 (BP Location: Left arm, Patient Position: Sitting, Cuff Size: Adult Regular)   Pulse 77   Temp 98.1  F (36.7  C) (Oral)   Resp 20   Ht 1.575 m (5' 2\")   Wt 56.2 kg (123 lb 12.8 oz)   SpO2 98%   BMI 22.64 kg/m    Body mass index is 22.64 kg/m .  Physical Exam   GENERAL: healthy, alert and no distress  EYES: Eyes grossly normal to inspection, PERRL and conjunctivae and sclerae normal  HENT: ear canals and TM's normal, nose and mouth without ulcers or lesions  NECK: no adenopathy, no asymmetry, masses, or scars and thyroid normal to palpation  RESP: lungs clear to auscultation - no rales, rhonchi or wheezes  CV: regular rate and rhythm, normal S1 S2, no S3 or S4, no murmur, click or rub, no peripheral edema and peripheral pulses strong  ABDOMEN: soft, nontender, no hepatosplenomegaly, no masses and bowel sounds normal  MS: no gross musculoskeletal defects noted, no edema  SKIN: no suspicious lesions or rashes  NEURO: Normal strength and tone, mentation intact and speech normal  PSYCH: mentation appears normal, affect normal/bright                  "

## 2022-12-30 ENCOUNTER — TELEPHONE (OUTPATIENT)
Dept: FAMILY MEDICINE | Facility: CLINIC | Age: 42
End: 2022-12-30

## 2022-12-30 NOTE — TELEPHONE ENCOUNTER
General Call    Contacts       Type Contact Phone/Fax    12/30/2022 11:50 AM CST Phone (Incoming) [facility] - [place], [place] - [address] (Pharmacy) [phone]        Reason for Call:  Update that they have not been able to get in touch with patient to  medications. Was a return call from pharmacy as original call was disconnected

## 2022-12-30 NOTE — TELEPHONE ENCOUNTER
Writer called and spoke to [facility] regarding message below. Per Pharmacy staff, a nurse  called the Pharmacy to see if pt has not picked up RX yet. Pharmacy unsure the name of the nurse that called. There is no TE on pt's chart regarding this. Per Pharmacy staff, they were unable to reach the pt on multiple attempts with the same phone number on file.     Metformin sent on 12/27/2022 not picked up either.  Pt usually picks up his medication consistency. Pt has not picked up any RX that was sent on 12/28/2022.    Writer called pt with the help of a Radha  regarding message above. Call reached VM, left non-detailed VM with call back number.    If pt calls back, please ask pt why he has not picked up renewed RX from [facility] yet. Multiple attempts from Pharmacy has been made out to pt to  RX. Thanks.    MERLY MendezN, RN   [facility]

## 2023-01-03 ENCOUNTER — ALLIED HEALTH/NURSE VISIT (OUTPATIENT)
Dept: NURSING | Facility: CLINIC | Age: 43
End: 2023-01-03
Payer: COMMERCIAL

## 2023-01-03 DIAGNOSIS — E11.65 TYPE 2 DIABETES MELLITUS WITH HYPERGLYCEMIA, WITH LONG-TERM CURRENT USE OF INSULIN (H): Primary | ICD-10-CM

## 2023-01-03 DIAGNOSIS — Z71.89 COMPLEX CARE COORDINATION: Primary | ICD-10-CM

## 2023-01-03 DIAGNOSIS — Z79.4 TYPE 2 DIABETES MELLITUS WITH HYPERGLYCEMIA, WITH LONG-TERM CURRENT USE OF INSULIN (H): Primary | ICD-10-CM

## 2023-01-03 PROCEDURE — 99207 PR NO CHARGE LOS: CPT

## 2023-01-03 NOTE — PROGRESS NOTES
Clinic Care Coordination Medication Education Follow - Up Visit    Patient presents for:  Medication education, Pill box teaching, Compliance monitoring and Medication reconciliation    Language: Radha  : Yes    Communication: Literate in other languages    Accompanied by:      Patient Living Situation:      Primary Care Provider:  Peggy Ortiz    Barriers:  Financial, Language, Cultural, Poor insight into disease process, Inadequate support at home, Non-compliance of medications, Multiple uncontrolled disease states and Chronic persistent mental illness    Medication List (see cited below): Patient presents with all medications EXCEPT mirtazapine    Current Outpatient Medications   Medication Sig     acetaminophen (TYLENOL) 500 MG tablet Take 2 tablets (1,000 mg) by mouth 3 times daily as needed for pain     alcohol swab prep pads Use to swab area of injection/everton as directed.     atorvastatin (LIPITOR) 20 MG tablet Take 1 tablet (20 mg) by mouth daily     blood glucose (NO BRAND SPECIFIED) test strip Use to test blood sugar 1 times daily or as directed. To accompany: Blood Glucose Monitor Brands: per insurance.     blood glucose calibration (NO BRAND SPECIFIED) solution To accompany: Blood Glucose Monitor Brands: per insurance.     blood glucose monitoring (NO BRAND SPECIFIED) meter device kit Use to test blood sugar 1 times daily or as directed. Preferred blood glucose meter OR supplies to accompany: Blood Glucose Monitor Brands: per insurance.     glipiZIDE (GLUCOTROL XL) 5 MG 24 hr tablet Take 1 tablet (5 mg) by mouth daily     metFORMIN (GLUCOPHAGE XR) 500 MG 24 hr tablet Take 1 tablet (500 mg) by mouth 2 times daily (with meals)     mirtazapine (REMERON) 7.5 MG tablet Take 1 tablet (7.5 mg) by mouth At Bedtime     thin (NO BRAND SPECIFIED) lancets Use with lanceting device. To accompany: Blood Glucose Monitor Brands: per insurance.     No current facility-administered medications for this  visit.         Compliance: 10%    Future Appointments   Date Time Provider Department Center   2/8/2023 12:40 PM Peggy Ortiz MD [facility]       Action Plan  RN Will:  1/18/2023 at 11am.          Nursing Notes: CCRN met with patient today in clinic for MEV follow up. Patient forgot to bring all his 3 med boxes. Patient brought in all prescription bottles except Mirtazapine. Educated patient how to take his meds correctly. Patient states Metformin 500mg ER didn't give him stomach pain. Agreed to start taking glipizide, Metformin, mirtazapine and lipitor as prescribed. Patient declined to check his BG using glucometer even with much encouragement stating that he doesn't like to poke with his finger daily. Agreed to try freestyle ольга. Agreed to come see MTM re: this and scheduled with MTM on 2/8/2023 same day with PCP. Patient prefers to see PCP before 12pm because he has to go to work. Consulted with PCP and PCP agrees to see him early if he comes in early before or right after MTM appt.      Has appointment with Cesar for medical waiver scheduled on 1/4/2023 at 10:00am. Thida from

## 2023-01-18 ENCOUNTER — PATIENT OUTREACH (OUTPATIENT)
Dept: NURSING | Facility: CLINIC | Age: 43
End: 2023-01-18
Payer: COMMERCIAL
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
## 2023-01-20 ENCOUNTER — OFFICE VISIT (OUTPATIENT)
Dept: FAMILY MEDICINE | Facility: CLINIC | Age: 43
End: 2023-01-20
Payer: COMMERCIAL

## 2023-01-20 ENCOUNTER — HOSPITAL ENCOUNTER (EMERGENCY)
Facility: HOSPITAL | Age: 43
Discharge: HOME | End: 2023-01-20
Attending: EMERGENCY MEDICINE | Admitting: EMERGENCY MEDICINE
Payer: COMMERCIAL

## 2023-01-20 ENCOUNTER — APPOINTMENT (OUTPATIENT)
Dept: RADIOLOGY | Facility: HOSPITAL | Age: 43
End: 2023-01-20
Attending: EMERGENCY MEDICINE
Payer: COMMERCIAL

## 2023-01-20 VITALS
OXYGEN SATURATION: 99 % | HEART RATE: 76 BPM | RESPIRATION RATE: 16 BRPM | DIASTOLIC BLOOD PRESSURE: 83 MMHG | SYSTOLIC BLOOD PRESSURE: 122 MMHG | TEMPERATURE: 98.5 F | BODY MASS INDEX: 22.42 KG/M2 | WEIGHT: 122.6 LBS

## 2023-01-20 VITALS
HEART RATE: 88 BPM | TEMPERATURE: 99.6 F | DIASTOLIC BLOOD PRESSURE: 70 MMHG | RESPIRATION RATE: 16 BRPM | SYSTOLIC BLOOD PRESSURE: 118 MMHG | OXYGEN SATURATION: 99 %

## 2023-01-20 DIAGNOSIS — M94.0 COSTOCHONDRITIS: ICD-10-CM

## 2023-01-20 DIAGNOSIS — B18.2 CHRONIC HEPATITIS C WITHOUT HEPATIC COMA (H): ICD-10-CM

## 2023-01-20 DIAGNOSIS — R07.89 CHEST WALL TENDERNESS: ICD-10-CM

## 2023-01-20 DIAGNOSIS — E78.5 HYPERLIPIDEMIA LDL GOAL <70: ICD-10-CM

## 2023-01-20 DIAGNOSIS — R07.9 CHEST PAIN, UNSPECIFIED TYPE: ICD-10-CM

## 2023-01-20 DIAGNOSIS — R07.9 ACUTE CHEST PAIN: Primary | ICD-10-CM

## 2023-01-20 DIAGNOSIS — E11.65 TYPE 2 DIABETES MELLITUS WITH HYPERGLYCEMIA, WITHOUT LONG-TERM CURRENT USE OF INSULIN (H): ICD-10-CM

## 2023-01-20 DIAGNOSIS — F43.21 ADJUSTMENT DISORDER WITH DEPRESSED MOOD: ICD-10-CM

## 2023-01-20 DIAGNOSIS — R10.13 MIDEPIGASTRIC PAIN: ICD-10-CM

## 2023-01-20 LAB
ALBUMIN SERPL BCG-MCNC: 4.6 G/DL (ref 3.5–5.2)
ALP SERPL-CCNC: 83 U/L (ref 40–129)
ALT SERPL W P-5'-P-CCNC: 54 U/L (ref 10–50)
ANION GAP SERPL CALCULATED.3IONS-SCNC: 10 MMOL/L (ref 7–15)
AST SERPL W P-5'-P-CCNC: 33 U/L (ref 10–50)
ATRIAL RATE - MUSE: 78 BPM
BASOPHILS # BLD AUTO: 0.1 10E3/UL (ref 0–0.2)
BASOPHILS NFR BLD AUTO: 1 %
BILIRUB DIRECT SERPL-MCNC: 0.27 MG/DL (ref 0–0.3)
BILIRUB SERPL-MCNC: 1.3 MG/DL
BUN SERPL-MCNC: 7.4 MG/DL (ref 6–20)
CALCIUM SERPL-MCNC: 9.7 MG/DL (ref 8.6–10)
CHLORIDE SERPL-SCNC: 99 MMOL/L (ref 98–107)
CREAT SERPL-MCNC: 0.67 MG/DL (ref 0.67–1.17)
DEPRECATED HCO3 PLAS-SCNC: 27 MMOL/L (ref 22–29)
DIASTOLIC BLOOD PRESSURE - MUSE: NORMAL MMHG
EOSINOPHIL # BLD AUTO: 0.6 10E3/UL (ref 0–0.7)
EOSINOPHIL NFR BLD AUTO: 7 %
ERYTHROCYTE [DISTWIDTH] IN BLOOD BY AUTOMATED COUNT: 11.4 % (ref 10–15)
GFR SERPL CREATININE-BSD FRML MDRD: >90 ML/MIN/1.73M2
GLUCOSE SERPL-MCNC: 310 MG/DL (ref 70–99)
HCT VFR BLD AUTO: 46.2 % (ref 40–53)
HGB BLD-MCNC: 16.2 G/DL (ref 13.3–17.7)
IMM GRANULOCYTES # BLD: 0 10E3/UL
IMM GRANULOCYTES NFR BLD: 0 %
INTERPRETATION ECG - MUSE: NORMAL
LIPASE SERPL-CCNC: 35 U/L (ref 13–60)
LYMPHOCYTES # BLD AUTO: 1.8 10E3/UL (ref 0.8–5.3)
LYMPHOCYTES NFR BLD AUTO: 22 %
MCH RBC QN AUTO: 32.3 PG (ref 26.5–33)
MCHC RBC AUTO-ENTMCNC: 35.1 G/DL (ref 31.5–36.5)
MCV RBC AUTO: 92 FL (ref 78–100)
MONOCYTES # BLD AUTO: 0.3 10E3/UL (ref 0–1.3)
MONOCYTES NFR BLD AUTO: 4 %
NEUTROPHILS # BLD AUTO: 5.2 10E3/UL (ref 1.6–8.3)
NEUTROPHILS NFR BLD AUTO: 66 %
NRBC # BLD AUTO: 0 10E3/UL
NRBC BLD AUTO-RTO: 0 /100
P AXIS - MUSE: 59 DEGREES
PLATELET # BLD AUTO: 218 10E3/UL (ref 150–450)
POTASSIUM SERPL-SCNC: 4.1 MMOL/L (ref 3.4–5.3)
PR INTERVAL - MUSE: 150 MS
PROT SERPL-MCNC: 7.7 G/DL (ref 6.4–8.3)
QRS DURATION - MUSE: 90 MS
QT - MUSE: 356 MS
QTC - MUSE: 405 MS
R AXIS - MUSE: -6 DEGREES
RBC # BLD AUTO: 5.02 10E6/UL (ref 4.4–5.9)
SODIUM SERPL-SCNC: 136 MMOL/L (ref 136–145)
SYSTOLIC BLOOD PRESSURE - MUSE: NORMAL MMHG
T AXIS - MUSE: 33 DEGREES
TROPONIN T SERPL HS-MCNC: 6 NG/L
TROPONIN T SERPL HS-MCNC: <6 NG/L
VENTRICULAR RATE- MUSE: 78 BPM
WBC # BLD AUTO: 8 10E3/UL (ref 4–11)

## 2023-01-20 PROCEDURE — 93005 ELECTROCARDIOGRAM TRACING: CPT | Performed by: FAMILY MEDICINE

## 2023-01-20 PROCEDURE — 84484 ASSAY OF TROPONIN QUANT: CPT | Performed by: EMERGENCY MEDICINE

## 2023-01-20 PROCEDURE — 85025 COMPLETE CBC W/AUTO DIFF WBC: CPT | Performed by: EMERGENCY MEDICINE

## 2023-01-20 PROCEDURE — 250N000013 HC RX MED GY IP 250 OP 250 PS 637: Performed by: EMERGENCY MEDICINE

## 2023-01-20 PROCEDURE — 36415 COLL VENOUS BLD VENIPUNCTURE: CPT | Performed by: EMERGENCY MEDICINE

## 2023-01-20 PROCEDURE — 80053 COMPREHEN METABOLIC PANEL: CPT | Performed by: EMERGENCY MEDICINE

## 2023-01-20 PROCEDURE — 99285 EMERGENCY DEPT VISIT HI MDM: CPT | Mod: 25

## 2023-01-20 PROCEDURE — 82248 BILIRUBIN DIRECT: CPT | Performed by: EMERGENCY MEDICINE

## 2023-01-20 PROCEDURE — 71046 X-RAY EXAM CHEST 2 VIEWS: CPT

## 2023-01-20 PROCEDURE — 83690 ASSAY OF LIPASE: CPT | Performed by: EMERGENCY MEDICINE

## 2023-01-20 PROCEDURE — 93005 ELECTROCARDIOGRAM TRACING: CPT | Performed by: EMERGENCY MEDICINE

## 2023-01-20 PROCEDURE — 99215 OFFICE O/P EST HI 40 MIN: CPT | Performed by: FAMILY MEDICINE

## 2023-01-20 PROCEDURE — 93010 ELECTROCARDIOGRAM REPORT: CPT | Performed by: INTERNAL MEDICINE

## 2023-01-20 PROCEDURE — 84484 ASSAY OF TROPONIN QUANT: CPT | Mod: 91 | Performed by: EMERGENCY MEDICINE

## 2023-01-20 RX ORDER — ASPIRIN 81 MG/1
324 TABLET, CHEWABLE ORAL ONCE
Status: COMPLETED | OUTPATIENT
Start: 2023-01-20 | End: 2023-01-20

## 2023-01-20 RX ORDER — IBUPROFEN 600 MG/1
600 TABLET, FILM COATED ORAL 3 TIMES DAILY
Qty: 30 TABLET | Refills: 0 | Status: SHIPPED | OUTPATIENT
Start: 2023-01-20 | End: 2023-04-24

## 2023-01-20 RX ADMIN — ASPIRIN 81 MG 324 MG: 81 TABLET ORAL at 14:28

## 2023-01-20 NOTE — PATIENT INSTRUCTIONS
To [place] ER for further evaluation of your chest pain, sudden onset of shortness of breath last night.  Could be your heart due to your diabetes-A1C 12.4  and elevated cholesterol

## 2023-01-20 NOTE — ED PROVIDER NOTES
EMERGENCY DEPARTMENT ENCOUNTER      NAME: Pablo Russo  AGE: 42 year old male  YOB: 1980  MRN: [id number]  EVALUATION DATE & TIME: No admission date for patient encounter.    PCP: Peggy Ortiz    ED PROVIDER: Domenica Angel M.D.      Chief Complaint   Patient presents with     Chest Pain         FINAL IMPRESSION:  1. Chest pain, unspecified type    2. Costochondritis          ED COURSE & MEDICAL DECISION MAKING:    Pertinent Labs & Imaging studies reviewed. (See chart for details)  ED Course as of 01/20/23 1712 Fri Jan 20, 2023   1711 Symptoms and exam are consistent with a costochondritis.  His initial troponin was 6 and his repeat was less than 6.  I will think this represents ACS.  Glucose was little elevated at 310.  Its been elevated before.  I reviewed his primary care visit from 12/28/2022 which did show a history of type 2 diabetes on insulin.  Patient has tenderness on exam consistent with costochondritis.  Chest x-ray does not show pneumonia or pneumothorax.  He is not anemic.  He is not in acute renal failure.  Will discharge home with ibuprofen for suspected costochondritis.  He can follow with his primary care doctor.  He is comfortable with the plan and was discharged home in stable condition.       Medical Decision Making    History:    Supplemental history from: Documented in chart, if applicable    External Record(s) reviewed: Documented in chart, if applicable. and Outpatient Record: 12/28/22 PCP visit    Work Up:    Chart documentation includes differential considered and any EKGs or imaging independently interpreted by provider, where specified.    In additional to work up documented, I considered the following work up: Documented in chart, if applicable.    External consultation:    Discussion of management with another provider: Documented in chart, if applicable    Complicating factors:    Care impacted by chronic illness: Diabetes    Care affected by social determinants of  "health: N/A    Disposition considerations: Discharge. I prescribed additional prescription strength medication(s) as charted. N/A.        At the conclusion of the encounter I discussed  the results of all of the tests and the disposition with patient.   All questions were answered.  The patient acknowledged understanding and was involved in the decision making regarding the overall care plan.      I discussed with patient the utility, limitations and findings of the exam/interventions/studies done during this visit as well as the list of differential diagnosis and symptoms to monitor/return to ER for.  Additional verbal discharge instructions were provided.     MEDICATIONS GIVEN IN THE EMERGENCY:  Medications   aspirin (ASA) chewable tablet 324 mg (324 mg Oral Given 1/20/23 1422)       NEW PRESCRIPTIONS STARTED AT TODAY'S ER VISIT  New Prescriptions    IBUPROFEN (ADVIL/MOTRIN) 600 MG TABLET    Take 1 tablet (600 mg) by mouth 3 times daily          =================================================================    HPI    Triage Note:   Patient comes in from clinic across the street with chest pain 3/10 that is on his left side of chest and is \"pounding\". The pain started last night. Patient states he has no problems with his heart, rather he has a bad liver.      Triage Assessment     Row Name 01/20/23 1414       Triage Assessment (Adult)    Airway WDL WDL                  Patient information was obtained from: Patient    Use of :Radha Balbuena      Pablo Russo is a 42 year old male who presents for evaluation of some chest pain.  He reports on the left side and started this morning.  He also noticed some pounding of his heart and that his heart was going fast.  Initially thought were just morning and then got better after drinking from hot water and then got worse again so he came in for evaluation.  He has a history of chest pain but that was not as bad as this.  Last time he got better within a couple " days but since this was more severe he wanted to come in and get evaluated.  Denies any cardiac history.  He denies any shortness of breath or nausea or vomiting or diaphoresis.    PAST MEDICAL HISTORY:  Past Medical History:   Diagnosis Date     Immune to hepatitis B 2/12/2020 2/11/2020 - Immune from prior infection     Immune to hepatitis B 2/12/2020 2/11/2020 - Immune from prior infection       PAST SURGICAL HISTORY:  No past surgical history on file.    CURRENT MEDICATIONS:    No current facility-administered medications for this encounter.    Current Outpatient Medications:      ibuprofen (ADVIL/MOTRIN) 600 MG tablet, Take 1 tablet (600 mg) by mouth 3 times daily, Disp: 30 tablet, Rfl: 0     acetaminophen (TYLENOL) 500 MG tablet, Take 2 tablets (1,000 mg) by mouth 3 times daily as needed for pain, Disp: 100 tablet, Rfl: 3     alcohol swab prep pads, Use to swab area of injection/everton as directed., Disp: 100 each, Rfl: 3     atorvastatin (LIPITOR) 20 MG tablet, Take 1 tablet (20 mg) by mouth daily, Disp: 90 tablet, Rfl: 3     blood glucose (NO BRAND SPECIFIED) test strip, Use to test blood sugar 1 times daily or as directed. To accompany: Blood Glucose Monitor Brands: per insurance., Disp: 100 strip, Rfl: 3     blood glucose calibration (NO BRAND SPECIFIED) solution, To accompany: Blood Glucose Monitor Brands: per insurance., Disp: 1 each, Rfl: 3     blood glucose monitoring (NO BRAND SPECIFIED) meter device kit, Use to test blood sugar 1 times daily or as directed. Preferred blood glucose meter OR supplies to accompany: Blood Glucose Monitor Brands: per insurance., Disp: 1 kit, Rfl: 0     Continuous Blood Gluc Sensor (FREESTYLE NGHIA 2 SENSOR) [facility], 1 each every 14 days Use 1 sensor every 14 days. Use to read blood sugars per 's instructions., Disp: 2 each, Rfl: 5     glipiZIDE (GLUCOTROL XL) 5 MG 24 hr tablet, Take 1 tablet (5 mg) by mouth daily, Disp: 30 tablet, Rfl: 3     metFORMIN  (GLUCOPHAGE XR) 500 MG 24 hr tablet, Take 1 tablet (500 mg) by mouth 2 times daily (with meals), Disp: 180 tablet, Rfl: 3     mirtazapine (REMERON) 7.5 MG tablet, Take 1 tablet (7.5 mg) by mouth At Bedtime, Disp: 90 tablet, Rfl: 3     thin (NO BRAND SPECIFIED) lancets, Use with lanceting device. To accompany: Blood Glucose Monitor Brands: per insurance., Disp: 100 each, Rfl: 3    ALLERGIES:  No Known Allergies    FAMILY HISTORY:  No family history on file.    SOCIAL HISTORY:   Social History     Socioeconomic History     Marital status: Single     Number of children: 0   Tobacco Use     Smoking status: Never     Smokeless tobacco: Never   Vaping Use     Vaping Use: Never used   Substance and Sexual Activity     Alcohol use: Yes     Drug use: Not Currently     Sexual activity: Not Currently     Partners: Female   Social History Narrative    6/2020:    - [place] refugee. Born in [place]. [place] He is not yet a [place] citizen.  - Never ; no children.  - Established care with Dr. Ortiz at [facility] in June 2020.    FAMILY HISTORY  - His parents and siblings remain  in [place]    LIVING SITUATION  - Lives in a house with his grandparents, uncle, and several friends  - Household of 6    LANGUAGE(S) SPOKEN  - Radha    EMPLOYMENT  - Past employment:  at a restaurant  - Current employment: None    EDUCATION  - N one    Jehovah's witness  - Mandaen     Social Determinants of Health     Financial Resource Strain: Low Risk      Difficulty of Paying Living Expenses: Not very hard   Food Insecurity: No Food Insecurity     Worried About Running Out of Food in the Last Year: Never true     Ran Out of Food in the Last Year: Never true   Transportation Needs: No Transportation Needs     Lack of Transportation (Medical): No     Lack of Transportation (Non-Medical): No   Physical Activity: Insufficiently Active     Days of Exercise per Week: 5 days     Minutes of Exercise per Session: 10 min   Stress:  Stress Concern Present     Feeling of Stress : To some extent   Social Connections: Moderately Integrated     Frequency of Communication with Friends and Family: More than three times a week     Frequency of Social Gatherings with Friends and Family: More than three times a week     Attends Taoism Services: More than 4 times per year     Active Member of Clubs or Organizations: No     Attends Club or Organization Meetings: 1 to 4 times per year     Marital Status: Never    Intimate Partner Violence: Not At Risk     Fear of Current or Ex-Partner: No     Emotionally Abused: No     Physically Abused: No     Sexually Abused: No   Housing Stability: Low Risk      Unable to Pay for Housing in the Last Year: No     Number of Places Lived in the Last Year: 1     Unstable Housing in the Last Year: No       PHYSICAL EXAM    VITAL SIGNS: /72   Pulse 93   Temp 99.6  F (37.6  C) (Temporal)   Resp 16   SpO2 99%    GENERAL: Awake, alert, answering questions appropriately, chest wall tenderness on the left side, ambulatory, no acute distress.  SPEECH: Interpreters understanding speech without difficulty, Normal volume and jessica  PULMONARY: No respiratory distress, Lungs clear to auscultation bilaterally  CARDIOVASCULAR: Regular rate and rhythm, Distal pulses present and normal.  ABDOMINAL: Soft, Nondistended, Nontender, No rebound or guarding, No palpable masses  EXTREMITIES: No lower extremity edema.  PSYCH: Normal mood and affect     LAB:  All pertinent labs reviewed and interpreted.  Results for orders placed or performed during the hospital encounter of 01/20/23   XR Chest 2 Views    Impression    IMPRESSION: Negative chest.   Basic metabolic panel   Result Value Ref Range    Sodium 136 136 - 145 mmol/L    Potassium 4.1 3.4 - 5.3 mmol/L    Chloride 99 98 - 107 mmol/L    Carbon Dioxide (CO2) 27 22 - 29 mmol/L    Anion Gap 10 7 - 15 mmol/L    Urea Nitrogen 7.4 6.0 - 20.0 mg/dL    Creatinine 0.67 0.67 - 1.17  mg/dL    Calcium 9.7 8.6 - 10.0 mg/dL    Glucose 310 (H) 70 - 99 mg/dL    GFR Estimate >90 >60 mL/min/1.73m2   Result Value Ref Range    Troponin T, High Sensitivity 6 <=22 ng/L   Result Value Ref Range    Lipase 35 13 - 60 U/L   Hepatic panel   Result Value Ref Range    Protein Total 7.7 6.4 - 8.3 g/dL    Albumin 4.6 3.5 - 5.2 g/dL    Bilirubin Total 1.3 (H) <=1.2 mg/dL    Alkaline Phosphatase 83 40 - 129 U/L    AST 33 10 - 50 U/L    ALT 54 (H) 10 - 50 U/L    Bilirubin Direct 0.27 0.00 - 0.30 mg/dL   CBC with platelets and differential   Result Value Ref Range    WBC Count 8.0 4.0 - 11.0 10e3/uL    RBC Count 5.02 4.40 - 5.90 10e6/uL    Hemoglobin 16.2 13.3 - 17.7 g/dL    Hematocrit 46.2 40.0 - 53.0 %    MCV 92 78 - 100 fL    MCH 32.3 26.5 - 33.0 pg    MCHC 35.1 31.5 - 36.5 g/dL    RDW 11.4 10.0 - 15.0 %    Platelet Count 218 150 - 450 10e3/uL    % Neutrophils 66 %    % Lymphocytes 22 %    % Monocytes 4 %    % Eosinophils 7 %    % Basophils 1 %    % Immature Granulocytes 0 %    NRBCs per 100 WBC 0 <1 /100    Absolute Neutrophils 5.2 1.6 - 8.3 10e3/uL    Absolute Lymphocytes 1.8 0.8 - 5.3 10e3/uL    Absolute Monocytes 0.3 0.0 - 1.3 10e3/uL    Absolute Eosinophils 0.6 0.0 - 0.7 10e3/uL    Absolute Basophils 0.1 0.0 - 0.2 10e3/uL    Absolute Immature Granulocytes 0.0 <=0.4 10e3/uL    Absolute NRBCs 0.0 10e3/uL   Troponin T, High Sensitivity (now)   Result Value Ref Range    Troponin T, High Sensitivity <6 <=22 ng/L       RADIOLOGY:  XR Chest 2 Views   Final Result   IMPRESSION: Negative chest.          XR Chest 2 Views   Final Result   IMPRESSION: Negative chest.          EKG:    Date and time: January 20, 2023 at 1414  Rate: 71 bpm  Rhythm: Sinus rhythm  MI interval: 154 ms  QRS interval: 86 ms  QT/QTc: 370/402 ms  ST changes or T wave changes: No acute ST or T wave abnormality  Change from prior ECG: No significant change from prior  I have independently reviewed and interpreted this EKG.     Domenica Angel,  M.D.  Emergency Medicine  [facility] EMERGENCY DEPARTMENT  [facility] [facility] [id number] [phone]  Dept: [phone]     Domenica Angel MD  01/20/23 5290

## 2023-01-20 NOTE — ED NOTES
Expected Patient Referral to ED  1:47 PM    Referring Clinic/Provider:  Family Medicine [facility]    Reason for referral/Clinical facts:  Woke up last night with shortness of breath. Associated with throbbing chest pain, slowly got better. Pain is now 3/10, tender to left side of chest. No vomiting or fever. Vitals are stable.     Recommendations provided:  Send to ED for further evaluation    Caller was informed that this institution does possess the capabilities and/or resources to provide for patient and should be transferred to our facility.    Discussed that if direct admit is sought and any hurdles are encountered, this ED would be happy to see the patient and evaluate.    Informed caller that recommendations provided are recommendations based only on the facts provided and that they responsible to accept or reject the advice, or to seek a formal in person consultation as needed and that this ED will see/treat patient should they arrive.      EB GOODEN MD  [facility] EMERGENCY DEPARTMENT  [address] [place] [id number]  [phone]       Eb Gooden MD  01/20/23 0632

## 2023-01-20 NOTE — DISCHARGE INSTRUCTIONS
You were seen in the Emergency Department today for evaluation of chest pain.  Your lab work showed no cause of your symptoms. Your imaging studies showed no significant cause of your symptoms. You were prescribed ibuprofen for likely chest wall pain.. You should take all medications as prescribed.  Follow up with your primary care physician to ensure resolution of symptoms. Return if you have new or worsening symptoms.

## 2023-01-23 ENCOUNTER — TELEPHONE (OUTPATIENT)
Dept: FAMILY MEDICINE | Facility: CLINIC | Age: 43
End: 2023-01-23

## 2023-01-23 ENCOUNTER — PATIENT OUTREACH (OUTPATIENT)
Dept: NURSING | Facility: CLINIC | Age: 43
End: 2023-01-23
Payer: COMMERCIAL

## 2023-01-23 NOTE — PROGRESS NOTES
Clinic Care Coordination Contact  Follow Up Progress Note      Assessment: CCRN spoke with patient today via phone to follow up post ED visist on 1/20/2023. Per patient, he's doing fine after discharged from the ED. Declined ED follow up appt with PCP but plans to attend his follow up with PCP on 2/8/2023. CCRN reviewed ED visit notes and elevated BG. Patient again states he takes his meds when he wants and it's not his priority. His priority right now is his citizenship. CCRN had been trying to hard to educate and assist patient to take his meds but patient again and again declined. Instructed patient to reach out to CCRN when he needs assist with med teaching or willing to work on med compliance. Patient verbalized understanding. Patient no showed his appt with Cesar for medical waiver on 1/5/2023 even his [employer] worker went to his house to pick him up for this appt.     Care Gaps:    There are no preventive care reminders to display for this patient.     Outreach Frequency: 8 weeks    Plan:   1) Patient's [facility] worker from [facility] will continue to assist patient with reschedule appt with Cesar he no showed on 1/5/2023.   2) Patient is transitioned to maintenance the next 2 months.

## 2023-01-23 NOTE — TELEPHONE ENCOUNTER
DAWN: I spoke with patient and connected him to Lisbeth. Lisbeth will assist him reschedule with Cesar.

## 2023-01-23 NOTE — TELEPHONE ENCOUNTER
Patient Returning Call    Reason for call: pt calling back as Glenys has not called him.      Information relayed to patient:  Per Glenys, she will call him today as pt was in the ED.      Patient has additional questions:  No    Okay to leave a detailed message?: Yes at Home number on file [phone] (home)    Call taken on 1/23/23 at 1225 pm by KARINA Montes

## 2023-02-07 ENCOUNTER — TELEPHONE (OUTPATIENT)
Dept: FAMILY MEDICINE | Facility: CLINIC | Age: 43
End: 2023-02-07
Payer: COMMERCIAL

## 2023-02-07 NOTE — TELEPHONE ENCOUNTER
General Call      Reason for Call:  Med question    What are your questions or concerns:  Aleksandr wei Nurse from [facility] called and stated that this pt is a restricted recipient but he keeps seeing other providers. There are some medicine that needs the pt PCP to authorize. Aleksandr would like a call back from PCP to figure out onward how to deal with this pt. Please call Aleksandr back at [phone].     Date of last appointment with provider: 12/28/2022    Okay to leave a detailed message?: Yes at Other phone number:  [phone]

## 2023-02-08 ENCOUNTER — TELEPHONE (OUTPATIENT)
Dept: PHARMACY | Facility: CLINIC | Age: 43
End: 2023-02-08

## 2023-02-08 NOTE — TELEPHONE ENCOUNTER
Called christina back from [facility].   No answer.   Please have him leave a more specific message, I dont have time to call insurance companies all day.     Peggy Ortiz MD

## 2023-02-08 NOTE — TELEPHONE ENCOUNTER
PharmD Outbound Call:     Reason for call: MTM appointment today  ID#[id number]  Call attempt: x2, no answer, unable to leave VM.   Would recommend patient call to reschedule with MTM.     Paige Mcwilliams, Pharm.D., MPH  MTM Pharmacist Resident   [facility]

## 2023-02-08 NOTE — TELEPHONE ENCOUNTER
Called aleksandr back from [facility].   No answer.   Please have him leave a more specific message, I dont have time to call insurance companies all day.      MD Aleksandr Yeager reported patient is a restricted recipient participant to [facility] under the care of Dr. Ortiz only.  Per chart record, patient has made several attempts to see other providers at various health care for various medical care and for medication, Celebrex. Clinic RN advised Aleksandr RN at [facility] to review condition with patient and patient representative to ensure program/restriction is being understood.  Aleksandr reported this was discussed numerous times with patient through  and patient continues non-compliant with condition.  Clinic RN shared with Aleksandr, it is uncertain clinic staff is aware patient is a restricted recipient patient to Dr. Ortiz only.  Aleksandr advised to go to [place] and it will be on there.  RN shared with Aleksandr triage nurse do not review [place]. However,  might as they have to verify patient's coverage. Celebrex medication will not be authorized to fill and paid for by [facility] unless patient be seen by Dr. Ortiz.    Will route encounter to Dr. Ortiz for an update.     MERLY HerreraN, RN  [facility]

## 2023-02-20 ENCOUNTER — OFFICE VISIT (OUTPATIENT)
Dept: FAMILY MEDICINE | Facility: CLINIC | Age: 43
End: 2023-02-20
Payer: COMMERCIAL

## 2023-02-20 VITALS
DIASTOLIC BLOOD PRESSURE: 76 MMHG | TEMPERATURE: 98.7 F | WEIGHT: 124.8 LBS | HEART RATE: 82 BPM | SYSTOLIC BLOOD PRESSURE: 114 MMHG | RESPIRATION RATE: 16 BRPM | BODY MASS INDEX: 22.83 KG/M2 | OXYGEN SATURATION: 97 %

## 2023-02-20 DIAGNOSIS — R10.2 SUPRAPUBIC ABDOMINAL PAIN: Primary | ICD-10-CM

## 2023-02-20 DIAGNOSIS — N48.1 BALANITIS: ICD-10-CM

## 2023-02-20 DIAGNOSIS — R81 GLUCOSURIA: ICD-10-CM

## 2023-02-20 LAB
ALBUMIN UR-MCNC: NEGATIVE MG/DL
APPEARANCE UR: CLEAR
BILIRUB UR QL STRIP: NEGATIVE
COLOR UR AUTO: ABNORMAL
GLUCOSE UR STRIP-MCNC: >=1000 MG/DL
HGB UR QL STRIP: NEGATIVE
KETONES UR STRIP-MCNC: NEGATIVE MG/DL
LEUKOCYTE ESTERASE UR QL STRIP: NEGATIVE
NITRATE UR QL: NEGATIVE
PH UR STRIP: 7 [PH] (ref 5–8)
SP GR UR STRIP: 1.01 (ref 1–1.03)
UROBILINOGEN UR STRIP-ACNC: 0.2 E.U./DL

## 2023-02-20 PROCEDURE — 99213 OFFICE O/P EST LOW 20 MIN: CPT | Performed by: FAMILY MEDICINE

## 2023-02-20 PROCEDURE — 81003 URINALYSIS AUTO W/O SCOPE: CPT | Performed by: FAMILY MEDICINE

## 2023-02-20 RX ORDER — CLOTRIMAZOLE 1 %
CREAM (GRAM) TOPICAL 2 TIMES DAILY
Qty: 30 G | Refills: 1 | Status: SHIPPED | OUTPATIENT
Start: 2023-02-20

## 2023-02-20 NOTE — PROGRESS NOTES
OUTPATIENT VISIT NOTE                                                   Date of Visit: 2/20/2023     Chief Complaint   Patient presents with:  Abdominal Pain: Abdominal pain x 1 week.  Penis/Scrotum Problem: Itchy foreskin and some peeling.            History of Present Illness   Pablo Russo is a 42 year old male with  by phone c/o abdominal pain for the last week or so. Hurts right over the bladder.  No dysuria.  No blood in urine. Quite constant.  Penis is peeling and itchy.  This has been going on for about one week.  No medication used.       MEDICATIONS   Current Outpatient Medications   Medication     acetaminophen (TYLENOL) 500 MG tablet     alcohol swab prep pads     atorvastatin (LIPITOR) 20 MG tablet     blood glucose (NO BRAND SPECIFIED) test strip     blood glucose calibration (NO BRAND SPECIFIED) solution     blood glucose monitoring (NO BRAND SPECIFIED) meter device kit     clotrimazole (LOTRIMIN) 1 % external cream     Continuous Blood Gluc Sensor (FREESTYLE NGHIA 2 SENSOR) MISC     glipiZIDE (GLUCOTROL XL) 5 MG 24 hr tablet     ibuprofen (ADVIL/MOTRIN) 600 MG tablet     metFORMIN (GLUCOPHAGE XR) 500 MG 24 hr tablet     mirtazapine (REMERON) 7.5 MG tablet     thin (NO BRAND SPECIFIED) lancets     No current facility-administered medications for this visit.         SOCIAL HISTORY   Social History     Tobacco Use     Smoking status: Never     Smokeless tobacco: Never   Substance Use Topics     Alcohol use: Yes           Physical Exam   Vitals:    02/20/23 1636   BP: 114/76   Pulse: 82   Resp: 16   Temp: 98.7  F (37.1  C)   TempSrc: Oral   SpO2: 97%   Weight: 56.6 kg (124 lb 12.8 oz)        GENERAL:   Alert. Oriented.  EYES: Clear  HENT:  Ears: R TM pearly gray. Normal landmarks. L TM pearly gray.  Normal landmarks  Nose: Clear.  Sinuses: Nontender.  Oropharynx:  No erythema. No exudate.  NECK: Supple. No adenopathy.  LUNGS: Clear to ascultation.  No crackles.  No wheezing  HEART: RRR  SKIN:  No  rash.   ABDOMEN:  +BS. Mild suprapubic tenderness. Soft, no guarding, rebound, rigidity,mass, or organomegaly. No CVA tenderness    : red scaling on foreskin, glans is erythematous      Diagnostic Studies   LABS:  Results for orders placed or performed in visit on 02/20/23   UA reflex to Microscopic and Culture     Status: Abnormal    Specimen: Urine, Clean Catch   Result Value Ref Range    Color Urine Light Yellow Colorless, Straw, Light Yellow, Yellow    Appearance Urine Clear Clear    Glucose Urine >=1000 (A) Negative mg/dL    Bilirubin Urine Negative Negative    Ketones Urine Negative Negative mg/dL    Specific Gravity Urine 1.015 1.005 - 1.030    Blood Urine Negative Negative    pH Urine 7.0 5.0 - 8.0    Protein Albumin Urine Negative Negative mg/dL    Urobilinogen Urine 0.2 0.2, 1.0 E.U./dL    Nitrite Urine Negative Negative    Leukocyte Esterase Urine Negative Negative    Narrative    Microscopic not indicated            Assessment and Plan     Suprapubic abdominal pain  Mild.  Normal urine  - UA reflex to Microscopic and Culture    Balanitis  Will treat with clotrimazole.  - clotrimazole (LOTRIMIN) 1 % external cream  Dispense: 30 g; Refill: 1                   Discussed signs / symptoms that warrant urgent / emergent medical attention.     Recheck if worsening or not improving.       Sulma Cotter MD          Pertinent History     The following portions of the patient's history were reviewed and updated as appropriate: allergies, current medications, past family history, past medical history, past social history, past surgical history and problem list.

## 2023-02-21 NOTE — TELEPHONE ENCOUNTER
Patient has no showed for several appointments and is scheduled with me today. I will review this with him (again). He went to urgent care (again).     We have been struggling to get patient to understand that he needs to come to his scheduled follow ups and not go to the ER for nonurgent/nonemergent care. Language is a barrier but I do worry that he has some underlying mental health issues causing more barriers. We did have him evaluated by neuropsych, results were not conclusive.     I will have this conversation with him again and review medications.   We have had several conversations with him about being restricted.   His care coordination team is aware that he is restricted.   His medical chart should be flagged that he is restricted, but I am unsure how to do that.     Peggy Ortiz MD

## 2023-02-23 ENCOUNTER — TELEPHONE (OUTPATIENT)
Dept: FAMILY MEDICINE | Facility: CLINIC | Age: 43
End: 2023-02-23
Payer: COMMERCIAL

## 2023-02-23 DIAGNOSIS — E11.65 TYPE 2 DIABETES MELLITUS WITH HYPERGLYCEMIA, WITHOUT LONG-TERM CURRENT USE OF INSULIN (H): Primary | ICD-10-CM

## 2023-02-23 NOTE — TELEPHONE ENCOUNTER
General Call    Contacts       Type Contact Phone/Fax    02/23/2023 02:01 PM CST Phone (Incoming) Aleksandr [facility]         Reason for Call:  Aleksandr from [facility] is calling as pt is on restricted status and ONLY Dr. Ortiz can see pt , prescribe meds.      What are your questions or concerns:  Pt has upcoming appt with Glenys on 3/3/23 and Marco on 3/8/23.  Evadently pt has seen Dr. Henriquez and other providers in this clinic.  And in order to be paid, Dr. Ortiz needs to write referrals for [facility] RN and Marco and Dr. Henriquez to see pt or we will not be paid for services.      TC has gone though pt chart and the only mention of why  - was ns 54% of the time.  Pt has mental health issues, legal issues, and citzenship.  Aleksandr has phoned several times in recent months and was rather rude to TC on the phone.     The bottom line is if PCP wishes pt to see other providers for health care issues , then a referral MUST be done everytime or that provider and clinic will not be paid.      Date of last appointment with provider: upcoming on 3/23/23 with PCP    Okay to leave a detailed message?: No Aleksandr did not leave phone number to call back. Thanks    Call taken on 2/23/23 at 130 pm by KARINA Montes

## 2023-02-27 NOTE — TELEPHONE ENCOUNTER
Will send referrals today for all those providers.     Peggy Ortiz MD    As an aside:   Aleksandr from [facility] needs to leave detailed messages as he does not ever answer his phone when I call and it is completely unreasonable to ask us to call multiple times during the work day for him.   We have had multiple discussions with patient about being restricted.   Due to health illiteracy and underlying neurocognitive issues - he literally does not comprehend.

## 2023-03-03 ENCOUNTER — PATIENT OUTREACH (OUTPATIENT)
Dept: CARE COORDINATION | Facility: CLINIC | Age: 43
End: 2023-03-03

## 2023-03-03 NOTE — PROGRESS NOTES
Clinic Care Coordination Contact  [facility] [place]/Voicemail       Clinical Data: Care Coordinator Outreach  Outreach attempted x 2.  Left message on patient's voicemail with call back information and requested return call. Unable to leave voicemail.     Plan: Care  CHW will outreach again on 3/23/23 and if unreachable then plan to dis-enrolled patient from [facility].

## 2023-03-08 ENCOUNTER — OFFICE VISIT (OUTPATIENT)
Dept: PHARMACY | Facility: CLINIC | Age: 43
End: 2023-03-08
Attending: FAMILY MEDICINE
Payer: COMMERCIAL

## 2023-03-08 ENCOUNTER — LAB (OUTPATIENT)
Dept: LAB | Facility: CLINIC | Age: 43
End: 2023-03-08
Payer: COMMERCIAL

## 2023-03-08 VITALS — BODY MASS INDEX: 22.63 KG/M2 | WEIGHT: 123.7 LBS

## 2023-03-08 DIAGNOSIS — E11.00 TYPE 2 DIABETES MELLITUS WITH HYPEROSMOLARITY WITHOUT COMA, WITHOUT LONG-TERM CURRENT USE OF INSULIN (H): Primary | ICD-10-CM

## 2023-03-08 DIAGNOSIS — F51.01 PRIMARY INSOMNIA: ICD-10-CM

## 2023-03-08 DIAGNOSIS — F32.2 SEVERE MAJOR DEPRESSION WITHOUT PSYCHOTIC FEATURES (H): ICD-10-CM

## 2023-03-08 DIAGNOSIS — E11.00 TYPE 2 DIABETES MELLITUS WITH HYPEROSMOLARITY WITHOUT COMA, WITHOUT LONG-TERM CURRENT USE OF INSULIN (H): ICD-10-CM

## 2023-03-08 LAB — HBA1C MFR BLD: 12.3 % (ref 0–5.6)

## 2023-03-08 PROCEDURE — 99607 MTMS BY PHARM ADDL 15 MIN: CPT | Performed by: PHARMACIST

## 2023-03-08 PROCEDURE — 99605 MTMS BY PHARM NP 15 MIN: CPT | Performed by: PHARMACIST

## 2023-03-08 PROCEDURE — 83036 HEMOGLOBIN GLYCOSYLATED A1C: CPT

## 2023-03-08 PROCEDURE — 36415 COLL VENOUS BLD VENIPUNCTURE: CPT

## 2023-03-08 RX ORDER — MIRTAZAPINE 15 MG/1
15 TABLET, FILM COATED ORAL AT BEDTIME
Qty: 30 TABLET | Refills: 11 | Status: SHIPPED | OUTPATIENT
Start: 2023-03-08 | End: 2023-10-19 | Stop reason: DRUGHIGH

## 2023-03-08 NOTE — PATIENT INSTRUCTIONS
"Recommendations from today's MTM visit:                                                    1. Education on use of Stephan CGM and placement of sensor today (right arm)  2. Updated metformin prescription to reflect 1000 mg twice daily   3. Start Levemir 20 units daily  4. Per discussion with Dr. Ortiz, increase dose of mirtazapine to 15 mg daily   5. Lab today: A1c  - Recommended patient schedule follow up with Peggy Langford    Follow-up: Return in about 22 days (around 3/30/2023) for with me.    It was great speaking with you today.  I value your experience and would be very thankful for your time in providing feedback in our clinic survey. In the next few days, you may receive an email or text message from [employer] with a link to a survey related to your  clinical pharmacist.\"     To schedule another MTM appointment, please call the clinic directly or you may call the MTM scheduling line at [phone] or toll-free at [phone].     My Clinical Pharmacist's contact information:                                                      Please feel free to contact me with any questions or concerns you have.      Charlene Ovalle, PharmD, BCACP  Medication Therapy Management Pharmacist  "

## 2023-03-08 NOTE — PROGRESS NOTES
Medication Therapy Management (MTM) Encounter    ASSESSMENT:                            Medication Adherence/Access: Patient may benefit from improved adherence/medication Access.  May benefit from having medication pillbox and/or delivered to his house, will discuss this with patient at future visit.    1. Type 2 diabetes mellitus with hyperosmolarity without coma, without long-term current use of insulin (H)  A1c recheck today and is greatly elevated above ADA goal of less than 7%.  Patient reports taking max dose metformin and glipizide, but would benefit from additional therapy such as basal insulin given A1c.  Education provided on how to use stephan CGM as well as basal insulin.  Patient would benefit from starting low-dose ACE/ARB for microalbuminuria.  We will defer this change until next visit to limit medication changes and potential confusion.    2. Severe major depression without psychotic features (H)  Patient would benefit from increasing to discussion with PCP, prescription sent for increased dose.    PLAN:                            1. Education on use of Stephan CGM and placement of sensor today (right arm)  2. Updated metformin prescription to reflect 1000 mg twice daily   3. Start Levemir 20 units daily  4. Per discussion with Dr. Ortiz, increase dose of mirtazapine to 15 mg daily   5. Lab today: A1c  - Recommended patient schedule follow up with Peggy Langford    Medication issues to be addressed at a future visit:   1. ACEi?  2. Pillbox?    Follow-up: Return in about 22 days (around 3/30/2023) for with me.    SUBJECTIVE/OBJECTIVE:                          Pablo Russo is a 42 year old male coming in for an initial visit. He was referred to me from Peggy Ortiz. Patient seen with Radha ham. ID # [id number]    Reason for visit: MTM initial.    Allergies/ADRs: Reviewed in chart  Past Medical History: Reviewed in chart  Tobacco: He reports that he has never smoked. He has never used smokeless  tobacco.  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  Patient takes medications 2 time(s) per day.   Per patient, misses medication 0 times per week. Then later states that he sometimes forgets mediations 2-3 times per week.     Type 2 Diabetes:  Currently taking metformin  mg twice daily (reports taking 1000 mg twice daily) and glipizide XL 5 mg daily. Patient is not experiencing side effects.  - Metformin  mg vial with him today last filled 12/27/22 - vial remains full today. Initially states that he takes it twice daily and then states that he stopped it and that he is taking a larger dose which he left at home. Reports taking metformin 1000 mg twice daily (though not on med list and no fill hx per profile). States that it caused back pain so he will stop for a week at a time and then resume use. Doesn't think the  mg twice daily helps enough so then he went back to 1000 twice daily. No longer using insulin because it hurt his lower back, last used probably 1 year ago.   - Glipizide filled 1/20/23 #30/30 - and still full but states that he takes once daily as prescribed. Per chart appears also refilled on 2/28/23.   Blood sugar monitoring: Continuous Glucose Monitor. Brings with a new CGM today, not yet used. No longer using the traditional meter, been a long time.   Symptoms of low blood sugar? none  Symptoms of high blood sugar? Unable to address today  Eye exam:  Unable to address today  Foot exam:  Unable to address today  Diet/Exercise:  Unable to address today  Aspirin: No  Statin: Yes: atorvastatin   ACEi/ARB: No. Unable to address today  Urine Albumin:   Lab Results   Component Value Date    UMALCR 45.63 (H) 11/17/2022      Lab Results   Component Value Date    A1C 12.3 03/08/2023    A1C 12.4 11/17/2022    A1C 7.2 07/26/2022     BP Readings from Last 3  "Encounters:   02/20/23 114/76   01/20/23 118/70   01/20/23 122/83      Recent Labs   Lab Test 11/17/22  1502 10/18/21  1903   CHOL 126 185   HDL 30* 54   LDL 46 110*   TRIG 248* 105     Depression/Insomnia: mirtazapine 7.5 mg daily at bedtime (does not bring this with him today)  - Notes that he ran out of the mirtazapine and requested more from the doctor but states that he never got any (1 year prescription sent on 12/28/22).   - Per fill history, last filled 2/12 #90/90 - per pharmacy picked up on 2/21. Though patient states that he never receiv weighed more than 2 DGP is ed this. Cash price for 2 months will be $51.   - States that his sleep has been bad at night and he \"thinks a lot as well, specially since he is trying to become a [place] citizen\". The mirtazapine was helping him before.  PHQ 11/24/2021 7/26/2022 8/31/2022   PHQ-9 Total Score 6 10 1   Q9: Thoughts of better off dead/self-harm past 2 weeks Not at all Not at all Not at all     Today's Vitals: Wt 123 lb 11.2 oz (56.1 kg)   BMI 22.63 kg/m    ----------------    I spent 60 minutes with this patient today. All changes were made via collaborative practice agreement with Peggy Ortiz MD. A copy of the visit note was provided to the patient's provider(s).    A summary of these recommendations was declined by the patient.    Charlene Ovalle, PharmD, BCACP  Medication Therapy Management Pharmacist     Medication Therapy Recommendations  Adjustment disorder with depressed mood    Current Medication: mirtazapine (REMERON) 15 MG tablet   Rationale: Dose too low - Dosage too low - Effectiveness   Recommendation: Increase Dose   Status: Accepted per Provider         Type 2 diabetes mellitus with hyperglycemia, without long-term current use of insulin (H)    Current Medication: Continuous Blood Gluc Sensor (FREESTYLE NGHIA 2 SENSOR) [facility]   Rationale: Medication requires monitoring - Needs additional monitoring - Effectiveness   Recommendation: Self-Monitoring "   Status: Patient Agreed - Adherence/Education   Note: education today          Current Medication: insulin detemir (LEVEMIR PEN) 100 UNIT/ML pen   Rationale: Synergistic therapy - Needs additional medication therapy - Indication   Recommendation: Start Medication   Status: Accepted per CPA          Current Medication: metFORMIN (GLUCOPHAGE XR) 500 MG 24 hr tablet (Discontinued)   Rationale: Dose too low - Dosage too low - Effectiveness   Recommendation: Increase Dose   Status: Accepted per CPA

## 2023-03-14 ENCOUNTER — TELEPHONE (OUTPATIENT)
Dept: FAMILY MEDICINE | Facility: CLINIC | Age: 43
End: 2023-03-14
Payer: COMMERCIAL

## 2023-03-14 NOTE — LETTER
March 16, 2023      Pablo Russo  [address] [place] [address]        [facility] [place],     We have been trying to contact you on three separate occasions regarding a recent request for medication regarding nausea and back pain symptoms. We were unable to reach you.    If you are still needing the following, please call the clinic number above and schedule an appointment.      Sincerely,        [facility]

## 2023-03-14 NOTE — TELEPHONE ENCOUNTER
"Writer attempt #1 to call patient with the help of a Radha  regarding message below. No answer, left non-detailed VM with call back number.    If pt calls back, needs to be triaged for reported symptoms. If pt declines, pt would need to be seen for reported \"nausea and back pain.\" Thanks.    MERLY MendezN, RN   [facility]      "

## 2023-03-14 NOTE — TELEPHONE ENCOUNTER
New Medication Request    Contacts       Type Contact Phone/Fax    03/14/2023 03:42 PM CDT Phone (Incoming) Pablo Russo M (Self) [phone] (M)          What medication are you requesting?: medication for sleep, nausea and back pain    Reason for medication request: Patient states he saw INOCENTE Lara on 3/8/23 and request for sleep medication but he did not receive t the sleep medication instead he was given HTN, diabetes and mental health medications that he did not ask for.    His nausea and back pain started yesterday and wondering if he can sent something for it. Please triage.    Have you taken this medication before?: No    Controlled Substance Agreement on file:   CSA -- Patient Level:    CSA: None found at the patient level.         Patient offered an appointment? No    Preferred Pharmacy:   [facility] - [place], [place] - [address]  [address]  [address]  [place] [address]  Phone: [phone] Fax: [phone]      Okay to leave a detailed message?: Yes at Cell number on file:    Telephone Information:   Mobile [phone]

## 2023-03-15 NOTE — TELEPHONE ENCOUNTER
"Writer attempt #2 to call patient with the help of a Radha  regarding message below. No answer, left non-detailed VM with call back number.     If pt calls back, needs to be triaged for reported symptoms. If pt declines, pt would need to be seen for reported \"nausea and back pain.\" Thanks.     MERLY MendezN, RN              [facility]  "

## 2023-03-16 NOTE — TELEPHONE ENCOUNTER
Writer attempt #3 to call patient with the help of a Radha  regarding message below. No answer, left non-detailed VM with call back number.     Third attempt, letter will be sent.    Closing encounter.     MERLY MendezN, RN              [facility]

## 2023-03-23 ENCOUNTER — PATIENT OUTREACH (OUTPATIENT)
Dept: CARE COORDINATION | Facility: CLINIC | Age: 43
End: 2023-03-23
Payer: COMMERCIAL

## 2023-03-23 NOTE — PROGRESS NOTES
Clinic Care Coordination Contact    Assessment: Care Coordinator contacted patient for 2 month follow up.  Patient has been unable to reach on multiple attempts from [facility] Team members. CCC RN advised CHW to follow the standard work and dis-enroll patient from [facility] if unable to reach at this outreach.     CHW attempted to call patient for outreach and unable to reach nor able to leave a voice message due to voice mail not valid. Plan: CCC will no further do outreache at this time. PCP, please feel free to refer patient again if need(s) identify.

## 2023-03-27 ENCOUNTER — OFFICE VISIT (OUTPATIENT)
Dept: FAMILY MEDICINE | Facility: CLINIC | Age: 43
End: 2023-03-27
Payer: COMMERCIAL

## 2023-03-27 VITALS
BODY MASS INDEX: 22.64 KG/M2 | OXYGEN SATURATION: 98 % | TEMPERATURE: 98 F | SYSTOLIC BLOOD PRESSURE: 112 MMHG | WEIGHT: 123.8 LBS | RESPIRATION RATE: 16 BRPM | HEART RATE: 94 BPM | DIASTOLIC BLOOD PRESSURE: 77 MMHG

## 2023-03-27 DIAGNOSIS — K29.20 ACUTE ALCOHOLIC GASTRITIS WITHOUT HEMORRHAGE: ICD-10-CM

## 2023-03-27 DIAGNOSIS — R50.9 SUBJECTIVE FEVER: Primary | ICD-10-CM

## 2023-03-27 DIAGNOSIS — R50.9 FEVER, UNSPECIFIED FEVER CAUSE: ICD-10-CM

## 2023-03-27 PROCEDURE — 99213 OFFICE O/P EST LOW 20 MIN: CPT | Mod: CS | Performed by: NURSE PRACTITIONER

## 2023-03-27 PROCEDURE — U0003 INFECTIOUS AGENT DETECTION BY NUCLEIC ACID (DNA OR RNA); SEVERE ACUTE RESPIRATORY SYNDROME CORONAVIRUS 2 (SARS-COV-2) (CORONAVIRUS DISEASE [COVID-19]), AMPLIFIED PROBE TECHNIQUE, MAKING USE OF HIGH THROUGHPUT TECHNOLOGIES AS DESCRIBED BY CMS-2020-01-R: HCPCS | Performed by: NURSE PRACTITIONER

## 2023-03-27 PROCEDURE — U0005 INFEC AGEN DETEC AMPLI PROBE: HCPCS | Performed by: NURSE PRACTITIONER

## 2023-03-27 RX ORDER — ACETAMINOPHEN 500 MG
1000 TABLET ORAL EVERY 8 HOURS PRN
Qty: 100 TABLET | Refills: 0 | Status: SHIPPED | OUTPATIENT
Start: 2023-03-27 | End: 2023-04-24

## 2023-03-27 ASSESSMENT — ENCOUNTER SYMPTOMS
RHINORRHEA: 0
DYSURIA: 0
COUGH: 0

## 2023-03-27 NOTE — LETTER
[address] [address]  Phone: [phone]  Fax: [phone]    March 27, 2023        Pablo Russo  [address] [place] [address]          To whom it may concern:    RE: Pablo Russo    Patient was seen and treated today at our clinic and missed work.    Please contact me for questions or concerns.      Sincerely,        Brooke Tran, CNP

## 2023-03-27 NOTE — PATIENT INSTRUCTIONS
Please buy a thermometer.    Check your temperature.  If it is over 100.4, you have a fever.    If you are experiencing worsening symptoms, please come back for recheck.    If you are experiencing specific symptoms, you can come back again.    Right now you do not have a fever.

## 2023-03-27 NOTE — PROGRESS NOTES
Assessment & Plan     Fever, unspecified fever cause    - Symptomatic COVID-19 Virus (Coronavirus) by PCR    Subjective fever    - acetaminophen (TYLENOL) 500 MG tablet  Dispense: 100 tablet; Refill: 0     Patient with 24 hours of subjective fever chills.  Took Tylenol this morning.  Did not check his temperature.  No specific symptoms.  No fever here.    Advised the following    Check your temperature.  If it is over 100.4, you have a fever.    If you are experiencing worsening symptoms, please come back for recheck.    If you are experiencing specific symptoms, you can come back again.              Return in about 1 week (around 4/3/2023) for If no better.    Brooke Tran [facility]    Subjective     Hsa is a 42 year old male who presents to clinic today for the following health issues:  Chief Complaint   Patient presents with     Fever     Fever x yesterday. Chills.No cough, sore throat. Or runny nose.     HPI    Subjective fever.  Thinks he feels hot/chills.      Took Tylenol last night and this morning 2 hours ago.      No ill contacts.     No other symptoms.      Due to language barrier, an  was present during the history-taking and subsequent discussion (and for part of the physical exam) with this patient.        Review of Systems   HENT: Negative for congestion and rhinorrhea.    Respiratory: Negative for cough.    Genitourinary: Negative for dysuria.           Objective    /77   Pulse 94   Temp 98  F (36.7  C) (Oral)   Resp 16   Wt 56.2 kg (123 lb 12.8 oz)   SpO2 98%   BMI 22.64 kg/m    Physical Exam  Constitutional:       Appearance: Normal appearance.   HENT:      Nose: No congestion.      Mouth/Throat:      Pharynx: No posterior oropharyngeal erythema.   Pulmonary:      Effort: Pulmonary effort is normal.   Lymphadenopathy:      Cervical: No cervical adenopathy.   Skin:     General: Skin is warm.   Neurological:      Mental Status: He is alert.    Psychiatric:         Mood and Affect: Mood normal.

## 2023-03-28 LAB — SARS-COV-2 RNA RESP QL NAA+PROBE: NEGATIVE

## 2023-03-28 RX ORDER — ONDANSETRON 4 MG/1
4 TABLET, ORALLY DISINTEGRATING ORAL EVERY 8 HOURS PRN
Qty: 60 TABLET | Refills: 0 | OUTPATIENT
Start: 2023-03-28

## 2023-03-28 NOTE — TELEPHONE ENCOUNTER
Refused, medication has been discontinued by PCP  Radha Lizarraga, ANGELA   03/28/23 2:35 PM  [facility] Nurse Advisor

## 2023-04-08 DIAGNOSIS — K29.20 ACUTE ALCOHOLIC GASTRITIS WITHOUT HEMORRHAGE: ICD-10-CM

## 2023-04-08 DIAGNOSIS — M54.50 BACK PAIN, LUMBOSACRAL: ICD-10-CM

## 2023-04-08 DIAGNOSIS — Z76.0 ENCOUNTER FOR MEDICATION REFILL: ICD-10-CM

## 2023-04-09 NOTE — TELEPHONE ENCOUNTER
"Routing refill request to provider for review/approval because:  Drug not active on patient's medication list    Last Written Prescription Date:  ?  Last Fill Quantity: ?,  # refills: ?   Last office visit provider:  3/27/23, Brooke Tran CNP     Requested Prescriptions   Pending Prescriptions Disp Refills     olopatadine (PATANOL) 0.1 % ophthalmic solution [Pharmacy Med Name: OLOPATADINE HCL 0.1 % SOLN 0.1 Solution] 5 mL 3     Sig: PLACE 1 DROP INTO BOTH EYES 2 TIMES DAILY       Miscellaneous Opthalmic Allergy Drops Protocol Failed - 4/8/2023 11:01 AM        Failed - Medication is active on med list        Passed - Patient is age 4 or older        Passed - Recent (12 mo) or future (30 days) visit within the authorizing provider's specialty     Patient has had an office visit with the authorizing provider or a provider within the authorizing providers department within the previous 12 mos or has a future within next 30 days. See \"Patient Info\" tab in inbasket, or \"Choose Columns\" in Meds & Orders section of the refill encounter.           Routing refill request to provider for review/approval because:  Drug not active on patient's medication list    Last Written Prescription Date:  ?  Last Fill Quantity: ?,  # refills: ?   Last office visit provider:  3/27/23, Brooke Tran CNP                 ondansetron (ZOFRAN ODT) 4 MG ODT tab [Pharmacy Med Name: ONDANSETRON 4 MG TBDP 4 Tablet] 60 tablet 0     Sig: TAKE 1 TABLET (4 MG) BY MOUTH EVERY 8 HOURS AS NEEDED FOR NAUSEA        Antivertigo/Antiemetic Agents Failed - 4/8/2023 11:01 AM        Failed - Medication is active on med list        Passed - Recent (12 mo) or future (30 days) visit within the authorizing provider's specialty     Patient has had an office visit with the authorizing provider or a provider within the authorizing providers department within the previous 12 mos or has a future within next 30 days. See \"Patient Info\" tab in inbasket, or \"Choose " "Columns\" in Meds & Orders section of the refill encounter.              Passed - Patient is 18 years of age or older     Routing refill request to provider for review/approval because:  Drug not active on patient's medication list    Last Written Prescription Date:  ?  Last Fill Quantity: ?,  # refills: ?   Last office visit provider:  3/27/23, Brooke Tran CNP             celecoxib (CELEBREX) 200 MG capsule [Pharmacy Med Name: CELECOXIB 200 MG CAPS 200 Capsule] 25 capsule 0     Sig: TAKE 1 CAPSULE (200 MG) BY MOUTH DAILY       NSAID Medications Failed - 4/8/2023 11:01 AM        Failed - Normal ALT on file in past 12 months     Recent Labs   Lab Test 01/20/23  1441   ALT 54*             Failed - Medication is active on med list        Passed - Blood pressure under 140/90 in past 12 months     BP Readings from Last 3 Encounters:   03/27/23 112/77   02/20/23 114/76   01/20/23 118/70                 Passed - Normal AST on file in past 12 months     Recent Labs   Lab Test 01/20/23  1441   AST 33             Passed - Recent (12 mo) or future (30 days) visit within the authorizing provider's specialty     Patient has had an office visit with the authorizing provider or a provider within the authorizing providers department within the previous 12 mos or has a future within next 30 days. See \"Patient Info\" tab in inbasket, or \"Choose Columns\" in Meds & Orders section of the refill encounter.              Passed - Patient is age 6-64 years        Passed - Normal CBC on file in past 12 months     Recent Labs   Lab Test 01/20/23  1441   WBC 8.0   RBC 5.02   HGB 16.2   HCT 46.2                    Passed - Normal serum creatinine on file in past 12 months     Recent Labs   Lab Test 01/20/23  1441   CR 0.67       Ok to refill medication if creatinine is low               Kadie Hanks RN 04/09/23 10:32 AM  "

## 2023-04-10 ENCOUNTER — TELEPHONE (OUTPATIENT)
Dept: FAMILY MEDICINE | Facility: CLINIC | Age: 43
End: 2023-04-10
Payer: COMMERCIAL

## 2023-04-10 RX ORDER — ONDANSETRON 4 MG/1
4 TABLET, ORALLY DISINTEGRATING ORAL EVERY 8 HOURS PRN
Qty: 60 TABLET | Refills: 0 | Status: SHIPPED | OUTPATIENT
Start: 2023-04-10 | End: 2023-05-03

## 2023-04-10 RX ORDER — CELECOXIB 200 MG/1
200 CAPSULE ORAL DAILY
Qty: 25 CAPSULE | Refills: 0 | Status: SHIPPED | OUTPATIENT
Start: 2023-04-10 | End: 2023-06-30

## 2023-04-10 RX ORDER — OLOPATADINE HYDROCHLORIDE 1 MG/ML
1 SOLUTION/ DROPS OPHTHALMIC 2 TIMES DAILY
Qty: 5 ML | Refills: 3 | Status: SHIPPED | OUTPATIENT
Start: 2023-04-10 | End: 2023-10-19

## 2023-04-10 NOTE — TELEPHONE ENCOUNTER
General Call    Contacts       Type Contact Phone/Fax    04/10/2023 11:04 AM CDT Phone (Incoming) Aleksandr MILLER [facility]         Reason for Call:  Aleksandr MILLER [facility] calling to see if pt NS for last appt with PCP    Yes, Pt NS for both Dr. Ortiz 2/2023 and Camille Lara  3/30/23.  Pt will not be receiving any meds till sees Dr. Ortiz as pt is restricted. Thanks    Okay to leave a detailed message?: No task completed. Thanks    Call taken on 4/11/23 at 11 am by Melba Rivas CMA TC

## 2023-04-24 ENCOUNTER — OFFICE VISIT (OUTPATIENT)
Dept: FAMILY MEDICINE | Facility: CLINIC | Age: 43
End: 2023-04-24
Payer: COMMERCIAL

## 2023-04-24 VITALS
BODY MASS INDEX: 21.91 KG/M2 | TEMPERATURE: 97.9 F | HEART RATE: 72 BPM | OXYGEN SATURATION: 99 % | DIASTOLIC BLOOD PRESSURE: 83 MMHG | WEIGHT: 119.8 LBS | SYSTOLIC BLOOD PRESSURE: 115 MMHG | RESPIRATION RATE: 20 BRPM

## 2023-04-24 DIAGNOSIS — M54.50 ACUTE RIGHT-SIDED LOW BACK PAIN WITHOUT SCIATICA: Primary | ICD-10-CM

## 2023-04-24 PROCEDURE — 99214 OFFICE O/P EST MOD 30 MIN: CPT | Performed by: NURSE PRACTITIONER

## 2023-04-24 RX ORDER — ACETAMINOPHEN 500 MG
1000 TABLET ORAL EVERY 8 HOURS PRN
Qty: 100 TABLET | Refills: 1 | Status: SHIPPED | OUTPATIENT
Start: 2023-04-24

## 2023-04-24 RX ORDER — BLOOD-GLUCOSE METER
EACH MISCELLANEOUS
COMMUNITY
Start: 2022-12-28

## 2023-04-24 RX ORDER — IBUPROFEN 600 MG/1
600 TABLET, FILM COATED ORAL EVERY 6 HOURS PRN
Qty: 100 TABLET | Refills: 0 | Status: SHIPPED | OUTPATIENT
Start: 2023-04-24 | End: 2023-10-19 | Stop reason: ALTCHOICE

## 2023-04-24 NOTE — LETTER
April 24, 2023      Pablo Russo  [address] [place] [address]        To Whom It May Concern:    Pablo Russo was seen in our clinic. He may return to work with the following: limited to light duty - lifting no greater than 10 pounds until sees primary care provider or for 10 days.      Sincerely,        Brooke Tran, CNP

## 2023-04-24 NOTE — PROGRESS NOTES
"Assessment & Plan     Acute right-sided low back pain without sciatica    - ibuprofen (ADVIL/MOTRIN) 600 MG tablet  Dispense: 100 tablet; Refill: 0     Patient with intermittent right-sided low back pain, no midline tenderness.  No injuries.    No red flag symptoms.  No weakness, no radiculopathy.    10 pound lifting restriction for 10 days.  Follow-up with PCP.  Did attempt to refill OTC medications.    If they will not refill due to restricted status, can request refills through primary care provider.    Does not want to go to physical therapy after discussion.  20 minutes spent by me on the date of the encounter doing chart review, patient visit and documentation         Return in about 10 days (around 5/4/2023) for If no better.    Brooke Tran [facility]     Hsa is a 42 year old male who presents to clinic today for the following health issues:  Chief Complaint   Patient presents with     Back Pain     Lower back constant aching pain x reoccurring sx 2 weeks. No diarrhea or constipation.      HPI    Low back pain starting 2 weeks ago.      No specific injury.      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  Walks around during the work day.      Not painful now.  Hurt last time this morning x 3 hours.  Was at home.      No radiating pain.  No leg weakness.  \"Sometimes my knees hurt.\"     H/o similar back pain.  Taking Tylenol - helps a little.      Has seen PT for his back earlier this year.      According to chart, he is a restricted recipient and is only supposed to have medications written by his primary care provider.  Per previous visits, patient has a neurocognitive issue and does not seem to understand this.     Due to language barrier, an  was present during the history-taking and subsequent discussion (and for part of the physical exam) with this patient.         Review of Systems  See HPI      Objective    /83 " (BP Location: Right arm, Patient Position: Sitting, Cuff Size: Adult Regular)   Pulse 72   Temp 97.9  F (36.6  C) (Oral)   Resp 20   Wt 54.3 kg (119 lb 12.8 oz)   SpO2 99%   BMI 21.91 kg/m    Physical Exam  Constitutional:       Appearance: Normal appearance.   Eyes:      Conjunctiva/sclera: Conjunctivae normal.   Pulmonary:      Effort: Pulmonary effort is normal.   Musculoskeletal:         General: Tenderness (right sided low back ) present.      Comments: No midline lumbar spinal tenderness.   Skin:     General: Skin is warm.   Neurological:      Mental Status: He is alert.      Gait: Gait normal.   Psychiatric:         Mood and Affect: Mood normal.

## 2023-04-28 ENCOUNTER — PATIENT OUTREACH (OUTPATIENT)
Dept: CARE COORDINATION | Facility: CLINIC | Age: 43
End: 2023-04-28
Payer: COMMERCIAL

## 2023-04-28 ENCOUNTER — NURSE TRIAGE (OUTPATIENT)
Dept: NURSING | Facility: CLINIC | Age: 43
End: 2023-04-28
Payer: COMMERCIAL

## 2023-04-28 NOTE — PROGRESS NOTES
Clinic Care Coordination Contact  CCRN received a message from Ban Vanessa RN triage nurse today. Patient called into triage line for assist. Triage nurse then reached out to CCRN for assist. CCRN called and spoke with patient via phone. Patient states he's trying to reach his armhs worker - Lisbeth through [facility] but wasn't able her re: his upcoming appt with [facility] for his medical waiver on 5/11/2023. CCRN dialed out to patient's armhs worker and connect them during this call. Patient would like to confirm that his armhs worker will pick him up for his appt on 5/11/2023 which was confirmed by his armhs worker. Per Lisbeth, she already notified patient on 4/18/2023 that they will provide transportation but patient states he forgot about it. Patient will call his armhs worker directly for transportation assist.     No further assist needed from CCC.

## 2023-04-28 NOTE — TELEPHONE ENCOUNTER
"Nurse Triage SBAR    Is this a 2nd Level Triage? No    Situation/Background: Patient is calling with concern of wanting to speak with nurse Glenys at [facility]. Patient is calling with request to speak with Glenys, . Patient states he has an appointment with \"RK Worker\" on May 11th, patient states that Glenys knows where the appointment is scheduled. Patient states the appointment \"basically has to do with the brain.\"     Call was dropped, another interpretor was brought onto the call.   Interpretor [id number] is on the call.  Interpretor [id number], brought onto the call.               Recommendation: Per dispositions, Information provided. Care Coordinator was messaged and will follow up with patient this afternoon. Patient verbalized understanding and agrees with plan.    Patient phone is [phone]    Protocol Recommended Disposition: Telephone advice    Ban Vanessa RN on 4/28/2023 at 1:09 PM  [facility] Nurse Advisors    Reason for Disposition    General information question, no triage required and triager able to answer question    Protocols used: INFORMATION ONLY CALL - NO TRIAGE-A-      "

## 2023-05-02 DIAGNOSIS — K29.20 ACUTE ALCOHOLIC GASTRITIS WITHOUT HEMORRHAGE: ICD-10-CM

## 2023-05-02 DIAGNOSIS — Z76.0 ENCOUNTER FOR MEDICATION REFILL: Primary | ICD-10-CM

## 2023-05-03 RX ORDER — ONDANSETRON 4 MG/1
4 TABLET, ORALLY DISINTEGRATING ORAL EVERY 8 HOURS PRN
Qty: 60 TABLET | Refills: 0 | Status: SHIPPED | OUTPATIENT
Start: 2023-05-03 | End: 2023-10-19

## 2023-05-03 NOTE — TELEPHONE ENCOUNTER
Patient calls back inquiring about status of request.below and also pt says he would like a MVI as he has low energy in the AM when he gets up.  Pt has seen Pharm D, [facility] and PCP last seen in February, 2023.    He is asking about his eye drops that were filled on 4/10/23 with refills.  TC called [facility] to fill them for him as they were not picked up and put back on shelf.  [facility] delivered on 4/8/23 old script and new one delivered to pt on 4/14/23      Writer inform caller message was sent to provider- awaiting for response. Writer will send provider another message. Caller verbalizes understanding.     Melba Rivas CMA ,   [facility]  May 3, 2023 11:35 AM

## 2023-05-11 DIAGNOSIS — Z79.4 TYPE 2 DIABETES MELLITUS WITH HYPERGLYCEMIA, WITH LONG-TERM CURRENT USE OF INSULIN (H): ICD-10-CM

## 2023-05-11 DIAGNOSIS — E11.65 TYPE 2 DIABETES MELLITUS WITH HYPERGLYCEMIA, WITH LONG-TERM CURRENT USE OF INSULIN (H): ICD-10-CM

## 2023-05-12 RX ORDER — GLIPIZIDE 5 MG/1
TABLET, FILM COATED, EXTENDED RELEASE ORAL
Qty: 90 TABLET | Refills: 0 | Status: SHIPPED | OUTPATIENT
Start: 2023-05-12 | End: 2023-08-14

## 2023-05-12 NOTE — TELEPHONE ENCOUNTER
"Last Written Prescription Date:  12/8/22  Last Fill Quantity: 30,  # refills: 3   Last office visit provider:  12/28/22     Requested Prescriptions   Pending Prescriptions Disp Refills     glipiZIDE (GLUCOTROL XL) 5 MG 24 hr tablet [Pharmacy Med Name: GLIPIZIDE ER 5 MG TB24 5 Tablet] 30 tablet 3     Sig: TAKE 1 TABLET (5 MG) BY MOUTH DAILY FOR DIABETES       Sulfonylurea Agents Passed - 5/11/2023  9:20 AM        Passed - Patient has documented A1c within the specified period of time.     If HgbA1C is 8 or greater, it needs to be on file within the past 3 months.  If less than 8, must be on file within the past 6 months.     Recent Labs   Lab Test 03/08/23  1015   A1C 12.3*             Passed - Medication is active on med list        Passed - Patient is age 18 or older        Passed - Patient has a recent creatinine (normal) within the past 12 mos.     Recent Labs   Lab Test 01/20/23  1441   CR 0.67       Ok to refill medication if creatinine is low          Passed - Recent (6 mo) or future (30 days) visit within the authorizing provider's specialty     Patient had office visit in the last 6 months or has a visit in the next 30 days with authorizing provider or within the authorizing provider's specialty.  See \"Patient Info\" tab in inbasket, or \"Choose Columns\" in Meds & Orders section of the refill encounter.                 SHRUTHI KOROMA RN 05/12/23 11:33 AM  "

## 2023-05-30 ENCOUNTER — TELEPHONE (OUTPATIENT)
Dept: FAMILY MEDICINE | Facility: CLINIC | Age: 43
End: 2023-05-30
Payer: COMMERCIAL

## 2023-05-30 NOTE — TELEPHONE ENCOUNTER
Amee RRP team from [facility] stated that she has been trying to reach pt but was unable to.    - [place] was seen in [place] over the [place] and rx was prescribed but he was unable to get the prescription (Narcan 4 mg) because he is a restricted pt. Wondering if PCP can re-write the script.    Restricted Pharmacy: [facility]    Amee: [phone] (okay to leave detailed)    Chart reviewed, unable to see ED visit. Pt was last seen by Dr. Ortiz on 12/28/23.    Jake Velasco, BSN RN  [facility]

## 2023-06-01 ENCOUNTER — TELEPHONE (OUTPATIENT)
Dept: FAMILY MEDICINE | Facility: CLINIC | Age: 43
End: 2023-06-01

## 2023-06-01 NOTE — TELEPHONE ENCOUNTER
Chart reviewed and also called pharmacy to confirm medications:    -  writer was able to find eye drop (olopatadine 0.1%). it looks like pt still has 3 refills (will call pt and let him know). Pharmacy is able to refill it   - There were a few medication for pain (non of them specifically said for nerve pain). Writer is unsure which pain medication pt is requesting. Pharmacy is also unsure.  - could not find any vitamin medication. Pharmacy does not have any information on that either.    Called pt and asked him to spell out the name of the medications.  - pt stated his nerve pain medication is Celecoxib 200 mg  - eye drop medication is olopatadine 0.1%. writer informed pt that he still has refills and can pick it up anytime.   - pt stated the vitamin medication was not prescribed by PCP but pt would like some. Pt stated he does not have money to buy OTC vitamins. Pt stated he needs vitamins (not sure what kind) because he feels really weak in the morning like he has no energy.    - pt also stated that  His Blood glucose monitor is not working (his monitor screen sated: start new sensor). Pt was advised to put on a new sensor. He stated he did and it is still not working. Pt stated he is unable to scan anything. Pt was advised that he might in to come in for education on how to used the monitor and sensor. He stated he knows how to use it.      Jake Boyce Cem Say, BSN RN  [facility]

## 2023-06-01 NOTE — TELEPHONE ENCOUNTER
Reason for Call:  Other prescription    Detailed comments: patient called and needs 3 medication from Angel Mendez at [facility].    1)  Vitamin started 1 month ago (doesn't know the name)  2)  Nerve pain (doesn't know the name)  3)  Eye drop (doesn't know the name)    Would like to pickup today at:    [facility] [address], [place] [address]   ~20.3 mi  [phone]    Please contact patient.  Thank you.    Phone Number Patient can be reached at: Other phone number:  [phone]*    Best Time: any    Can we leave a detailed message on this number? YES    Call taken on 6/1/2023 at 10:31 AM by Shellie Remy

## 2023-06-06 ENCOUNTER — PATIENT OUTREACH (OUTPATIENT)
Dept: CARE COORDINATION | Facility: CLINIC | Age: 43
End: 2023-06-06
Payer: COMMERCIAL

## 2023-06-06 NOTE — PROGRESS NOTES
Clinic Care Coordination Contact    Follow Up Progress Note      Assessment: CCRN received a a message from clinic TC today stating that patient is on the line requesting to talk to CCRN directly. CAESAR Jones transferred the call. CCRN spoke with patient. Per patient, he just need the address and phone number for his eye clinic because his [employer] worker wasn't able to figure it out for her. Patient stated that he was recently seen an eye doctor and he just need to  his prescription but not sure how to get there. Patient drives. Patient states the eye clinic was close to [facility] and the eye doctor is Alfred. CCRN then asked if the clinic is [facility] on [facility]. Patient states that's the right one. CCRN provided patient with [facility] address and phone number.     [facility]  [employer]  [address], [place] [address]   ~8.1 [address]  [phone]    Plan: No further assist needed from CCC.

## 2023-06-09 ENCOUNTER — TELEPHONE (OUTPATIENT)
Dept: FAMILY MEDICINE | Facility: CLINIC | Age: 43
End: 2023-06-09
Payer: COMMERCIAL

## 2023-06-09 NOTE — TELEPHONE ENCOUNTER
FYI: CCRN spoke with patient today. Explained to patient that he needs to reach out to his [facility] worker to assist follow up on citizenship process. Patient already received citizenship waiver through [employer] last month. CCRN and patient left a voicemail for his arm worker today requesting a call to patient directly.

## 2023-06-09 NOTE — TELEPHONE ENCOUNTER
General Call    Contacts       Type Contact Phone/Fax    06/09/2023 10:32 AM CDT Phone (Incoming) Pablo Russo M (Self) [phone] (M)        Reason for Call:  Asking to speak to Glenys about updating his citizenship?  TC does not see any citizenship waiver in chart. Glenys just spoke with pt on Tuesday about eye script at Dr. Porras office.     Date of last appointment with provider: 12/2022    Okay to leave a detailed message?: Yes at Cell number on file:    Telephone Information:   Mobile [phone]     Call taken on 6/9/26  At 1035 am by KARINA Montes

## 2023-06-13 ENCOUNTER — NURSE TRIAGE (OUTPATIENT)
Dept: NURSING | Facility: CLINIC | Age: 43
End: 2023-06-13
Payer: COMMERCIAL

## 2023-06-13 NOTE — TELEPHONE ENCOUNTER
Lisbeth returned call. Patient would like CCRN to confirm with Lisbeth that they will continue to assist him with citizenship process. CCRN confirmed with Lisbeth, care coordinator with [facility] today that they are assisting patient with citizenship process but patient was impatient about this and constantly calling them.

## 2023-06-13 NOTE — TELEPHONE ENCOUNTER
DAWN: I confirmed with Lisbeth, care coordinator with [facility] just now that they didn't block his phone number but patient calls them 2-3 times a day even after they explained to him the process of citizenship.     Patient was graduated from [employer] in March, 2023 and I told him he needs to reach out to his [facility] worker to continue to assist him with citizenship process. I am not his lead care coordinator and he keeps calling the clinic requesting to talk to me even though I explained to him that I am not longer his care care coordinator since March, 2023.     Lisbeth and patient's niece have been assisting him with citizenship process. He just completed the waiver last month. Lisbeth and niece are in the process of getting proof from the [facility] so that patient doesn't have to pay the fee.     CCRN connected patient with Lisbeth today. Patient is scheduled to see Lisbeth in office tomorrow.     Please redirect patient to Lisbeth at [phone] with citizenship process.

## 2023-06-13 NOTE — TELEPHONE ENCOUNTER
Patient calls back inquiring about status of citizenship.  Pt called triage just now.       Writer inform caller message that CCRN wrote this am.  Writer informed pt that the process is lengthy and needs to wait for call instead of constantly calling.     Pt is saying that the papers were never filed and he tried waiting for Lisbeth to call but she blocked his phone. He states he only called her 1 or 2x in 3 days.    Pt states they need the assessment as to his mental health when he had the natalis assessment or the doctor's  Statement ?  That is why pt asked Glenys to assist him.  Pt is asking for a different care coordinator so can process can go smoother.  TC did explain to pt that process is lengthy again and when dealing with the government - its especially slow.  He needs to be patient and wait. Unsure if pt truly understands.     Writer will send CCRN another message. Caller verbalizes understanding.     Melba Rivas CMA ,   [facility]  June 13, 2023 2:24 PM

## 2023-06-13 NOTE — TELEPHONE ENCOUNTER
Requesting to speak to Glenys regarding citizenship. Transferred to clinic.  Brooke Montiel, RN on 6/13/2023 at 2:59 PM    Reason for Disposition    Information only question and nurse able to answer    Protocols used: INFORMATION ONLY CALL - NO TRIAGE-A-[place]

## 2023-06-22 ENCOUNTER — PATIENT OUTREACH (OUTPATIENT)
Dept: CARE COORDINATION | Facility: CLINIC | Age: 43
End: 2023-06-22
Payer: COMMERCIAL

## 2023-06-22 NOTE — PROGRESS NOTES
Clinic Care Coordination Contact  CCRN received a message from patient today stating that he already submitted his citizenship application with assist from his arm worker through [employer] - [phone].     Michelle [employer]  assisted patient with citizenship application   Lisbeth from [employer] will follow up on citizenship process

## 2023-06-30 DIAGNOSIS — M54.50 BACK PAIN, LUMBOSACRAL: ICD-10-CM

## 2023-06-30 DIAGNOSIS — Z76.0 ENCOUNTER FOR MEDICATION REFILL: ICD-10-CM

## 2023-06-30 NOTE — TELEPHONE ENCOUNTER
"Routing refill request to provider for review/approval because:  Labs out of range:  ALT    Last Written Prescription Date:  04/10/2023  Last Fill Quantity: 25,  # refills: 0   Last office visit provider:  12/28/2022     Requested Prescriptions   Pending Prescriptions Disp Refills     celecoxib (CELEBREX) 200 MG capsule 25 capsule 0     Sig: Take 1 capsule (200 mg) by mouth daily       NSAID Medications Failed - 6/30/2023  4:40 PM        Failed - Normal ALT on file in past 12 months     Recent Labs   Lab Test 01/20/23  1441   ALT 54*             Passed - Blood pressure under 140/90 in past 12 months     BP Readings from Last 3 Encounters:   04/24/23 115/83   03/27/23 112/77   02/20/23 114/76                 Passed - Normal AST on file in past 12 months     Recent Labs   Lab Test 01/20/23  1441   AST 33             Passed - Recent (12 mo) or future (30 days) visit within the authorizing provider's specialty     Patient has had an office visit with the authorizing provider or a provider within the authorizing providers department within the previous 12 mos or has a future within next 30 days. See \"Patient Info\" tab in inbasket, or \"Choose Columns\" in Meds & Orders section of the refill encounter.              Passed - Patient is age 6-64 years        Passed - Normal CBC on file in past 12 months     Recent Labs   Lab Test 01/20/23  1441   WBC 8.0   RBC 5.02   HGB 16.2   HCT 46.2                    Passed - Medication is active on med list        Passed - Normal serum creatinine on file in past 12 months     Recent Labs   Lab Test 01/20/23  1441   CR 0.67       Ok to refill medication if creatinine is low               Ban Vanessa, RN 06/30/23 4:40 PM  "

## 2023-07-03 RX ORDER — CELECOXIB 200 MG/1
200 CAPSULE ORAL DAILY
Qty: 25 CAPSULE | Refills: 0 | Status: SHIPPED | OUTPATIENT
Start: 2023-07-03 | End: 2023-10-19

## 2023-07-03 NOTE — TELEPHONE ENCOUNTER
Patient needs to schedule appointment with me and actually show up.   Please schedule appt with me next available for DM.   He is restricted to me and he keeps seeing other providers and no showing my appointments.   He probably feels terrible because his diabetes is out of control and he doesn't take his medications as prescribed.     Will refill those medications.     Peggy Ortiz MD

## 2023-08-01 ENCOUNTER — PATIENT OUTREACH (OUTPATIENT)
Dept: CARE COORDINATION | Facility: CLINIC | Age: 43
End: 2023-08-01
Payer: COMMERCIAL

## 2023-08-08 DIAGNOSIS — Z79.4 TYPE 2 DIABETES MELLITUS WITH HYPERGLYCEMIA, WITH LONG-TERM CURRENT USE OF INSULIN (H): ICD-10-CM

## 2023-08-08 DIAGNOSIS — E11.65 TYPE 2 DIABETES MELLITUS WITH HYPERGLYCEMIA, WITH LONG-TERM CURRENT USE OF INSULIN (H): ICD-10-CM

## 2023-08-08 NOTE — TELEPHONE ENCOUNTER
Routing refill request to provider for review/approval because:  Drug not on the [facility] refill protocol     Last Written Prescription Date:  1/3/23  Last Fill Quantity: 2,  # refills: 5   Last office visit provider:  4/24/23     Requested Prescriptions   Pending Prescriptions Disp Refills    Continuous Blood Gluc Sensor (FREESTYLE NGHIA 2 SENSOR) MIS [Pharmacy Med Name: FREESTYLE NGHIA 2 SENSOR MI Miscellaneous] 2 each 5     Sig: USE 1 EACH EVERY 14 DAYS TO READ BLOOD SUGARS PER 'S INSTRUCTIONS.       There is no refill protocol information for this order          Fallon Courtney RN 08/08/23 12:37 PM

## 2023-08-11 DIAGNOSIS — E11.65 TYPE 2 DIABETES MELLITUS WITH HYPERGLYCEMIA, WITH LONG-TERM CURRENT USE OF INSULIN (H): ICD-10-CM

## 2023-08-11 DIAGNOSIS — Z79.4 TYPE 2 DIABETES MELLITUS WITH HYPERGLYCEMIA, WITH LONG-TERM CURRENT USE OF INSULIN (H): ICD-10-CM

## 2023-08-11 NOTE — TELEPHONE ENCOUNTER
"Routing refill request to provider for review/approval because:  Labs out of range:  A1c 12.3 on 3/8/82014  Patient needs to be seen because:  been over 6 months since last doctors visit    Last Written Prescription Date:  5/12/2023  Last Fill Quantity: 90,  # refills: 0   Last office visit provider:  12/28/2022     Requested Prescriptions   Pending Prescriptions Disp Refills    glipiZIDE (GLUCOTROL XL) 5 MG 24 hr tablet [Pharmacy Med Name: GLIPIZIDE ER 5 MG TB24 5 Tablet] 90 tablet 0     Sig: TAKE 1 TABLET (5 MG) BY MOUTH DAILY FOR DIABETES       Sulfonylurea Agents Failed - 8/11/2023 11:57 AM        Failed - Patient has documented A1c within the specified period of time.     If HgbA1C is 8 or greater, it needs to be on file within the past 3 months.  If less than 8, must be on file within the past 6 months.     Recent Labs   Lab Test 03/08/23  1015   A1C 12.3*             Failed - Recent (6 mo) or future (30 days) visit within the authorizing provider's specialty     Patient had office visit in the last 6 months or has a visit in the next 30 days with authorizing provider or within the authorizing provider's specialty.  See \"Patient Info\" tab in inbasket, or \"Choose Columns\" in Meds & Orders section of the refill encounter.            Passed - Medication is active on med list        Passed - Patient is age 18 or older        Passed - Patient has a recent creatinine (normal) within the past 12 mos.     Recent Labs   Lab Test 01/20/23  1441   CR 0.67       Ok to refill medication if creatinine is low               Cora Carr RN 08/11/23 11:57 AM  "

## 2023-08-14 RX ORDER — GLIPIZIDE 5 MG/1
TABLET, FILM COATED, EXTENDED RELEASE ORAL
Qty: 90 TABLET | Refills: 0 | Status: SHIPPED | OUTPATIENT
Start: 2023-08-14 | End: 2023-10-19

## 2023-08-15 ENCOUNTER — PATIENT OUTREACH (OUTPATIENT)
Dept: CARE COORDINATION | Facility: CLINIC | Age: 43
End: 2023-08-15
Payer: COMMERCIAL

## 2023-08-17 ENCOUNTER — TELEPHONE (OUTPATIENT)
Dept: FAMILY MEDICINE | Facility: CLINIC | Age: 43
End: 2023-08-17
Payer: COMMERCIAL

## 2023-08-17 NOTE — TELEPHONE ENCOUNTER
Medication Question or Refill    Contacts         Type Contact Phone/Fax    08/17/2023 11:51 AM CDT Phone (Incoming) Pablo Russo M (Self) [phone] (M)            What medication are you calling about (include dose and sig)?: Celecoxib 200 mg    Preferred Pharmacy:   [facility] - [place], [place] - [address]  [address]  [address]  [place] [address]  Phone: [phone] Fax: [phone]      Controlled Substance Agreement on file:   CSA -- Patient Level:    CSA: None found at the patient level.       Who prescribed the medication?: Dr. Ortiz    Do you need a refill? Yes    When did you use the medication last? Currently out of medication.  Last dose was taken last week.      Patient offered an appointment?     Do you have any questions or concerns?        Okay to leave a detailed message?: Yes at Home number on file [phone] (home)

## 2023-08-17 NOTE — CONFIDENTIAL NOTE
Patient has not been seen by me in a long time. (December 2022)  No refills until seen.     Peggy Ortiz MD

## 2023-08-22 NOTE — TELEPHONE ENCOUNTER
Per patient he does not have transportation to the clinic.  is checking with [facility] on transportation benefits.

## 2023-08-22 NOTE — PATIENT INSTRUCTIONS
No ibuprofen if taking celecoxib (celebrex)     Can take Tylenol 1000 mg 3 times daily.      They may not fill this medication because you are restricted to Dr. Ortiz.  Only Dr. Ortiz should write your medicine.  If they will not fill it, just show them this paper and have [facility] ask for a refill of your Tylenol and ibuprofen.    Please see your primary care provider for this issue.                   5

## 2023-09-01 NOTE — TELEPHONE ENCOUNTER
Central coordinator scheduled tele visit.     With Family Practice (Peggy Ortiz MD)  10/19/2023 at 2:30 PM

## 2023-10-19 ENCOUNTER — VIRTUAL VISIT (OUTPATIENT)
Dept: FAMILY MEDICINE | Facility: CLINIC | Age: 43
End: 2023-10-19
Payer: COMMERCIAL

## 2023-10-19 DIAGNOSIS — F32.2 SEVERE MAJOR DEPRESSION WITHOUT PSYCHOTIC FEATURES (H): ICD-10-CM

## 2023-10-19 DIAGNOSIS — E11.65 TYPE 2 DIABETES MELLITUS WITH HYPERGLYCEMIA, WITHOUT LONG-TERM CURRENT USE OF INSULIN (H): Primary | ICD-10-CM

## 2023-10-19 DIAGNOSIS — M54.50 BACK PAIN, LUMBOSACRAL: ICD-10-CM

## 2023-10-19 DIAGNOSIS — Z76.0 ENCOUNTER FOR MEDICATION REFILL: ICD-10-CM

## 2023-10-19 PROCEDURE — 99443 PR PHYSICIAN TELEPHONE EVALUATION 21-30 MIN: CPT | Mod: 93 | Performed by: FAMILY MEDICINE

## 2023-10-19 RX ORDER — CELECOXIB 200 MG/1
200 CAPSULE ORAL DAILY
Qty: 25 CAPSULE | Refills: 0 | Status: SHIPPED | OUTPATIENT
Start: 2023-10-19 | End: 2023-10-19

## 2023-10-19 RX ORDER — MIRTAZAPINE 7.5 MG/1
7.5 TABLET, FILM COATED ORAL AT BEDTIME
Qty: 90 TABLET | Refills: 3 | Status: SHIPPED | OUTPATIENT
Start: 2023-10-19

## 2023-10-19 RX ORDER — CELECOXIB 200 MG/1
200 CAPSULE ORAL DAILY PRN
Qty: 60 CAPSULE | Refills: 3 | Status: SHIPPED | OUTPATIENT
Start: 2023-10-19

## 2023-10-19 RX ORDER — MIRTAZAPINE 7.5 MG/1
7.5 TABLET, FILM COATED ORAL AT BEDTIME
COMMUNITY
Start: 2023-07-28 | End: 2023-10-19

## 2023-10-19 RX ORDER — GLIPIZIDE 5 MG/1
TABLET, FILM COATED, EXTENDED RELEASE ORAL
Qty: 90 TABLET | Refills: 1 | Status: SHIPPED | OUTPATIENT
Start: 2023-10-19

## 2023-10-19 RX ORDER — OLOPATADINE HYDROCHLORIDE 1 MG/ML
1 SOLUTION/ DROPS OPHTHALMIC 2 TIMES DAILY
Qty: 5 ML | Refills: 3 | Status: SHIPPED | OUTPATIENT
Start: 2023-10-19

## 2023-10-19 ASSESSMENT — PATIENT HEALTH QUESTIONNAIRE - PHQ9
10. IF YOU CHECKED OFF ANY PROBLEMS, HOW DIFFICULT HAVE THESE PROBLEMS MADE IT FOR YOU TO DO YOUR WORK, TAKE CARE OF THINGS AT HOME, OR GET ALONG WITH OTHER PEOPLE: NOT DIFFICULT AT ALL
SUM OF ALL RESPONSES TO PHQ QUESTIONS 1-9: 4
SUM OF ALL RESPONSES TO PHQ QUESTIONS 1-9: 4

## 2023-10-19 NOTE — COMMUNITY RESOURCES LIST (ENGLISH)
10/19/2023   [facility] - Outpatient Clinics  N/A  For additional resource needs, please contact your health insurance member services or your primary care team.  Phone: [phone]   Email: N/A   Address: [facility] [place] [address]   Hours: N/A        Financial Stability       Rent and mortgage payment assistance  1  [facility] - Security Deposit Assistance Distance: 1.71 miles      In-Person   [address] [facility] [address] [place] [address]  Language: English  Hours: Mon - Fri 8:00 AM - 4:30 PM  Fees: Free   Phone: (445) 944-3331 Email: windypdesyusef@[facility]. Website: https://www.[facility]./[facility]-government/departments/health-and-wellness     2  [facility] DoYouRemembere Dawson - Multifamily Rental Assistance Program Distance: 1.76 miles      Phone/Virtual   [address] [facility] [address] [place] [address]  Language: English  Hours: Mon - Fri 8:00 AM - 5:00 PM Appt. Only  Fees: Free   Phone: (247) 772-2322 Email: mn.Biologics Modular@[facility]. Website: https://www.[facility]ams County Hospitalsing.gov/index.html          Food and Nutrition       Food pantry  3  [facility], [facility]. Distance: 1.28 miles      Delivery, [facility]   [address] [place] [address]  Language: English, Panamanian  Hours: Mon 9:00 AM - 6:00 PM Appt. Only, Tue - Fri 9:00 AM - 5:00 PM Appt. Only  Fees: Free   Phone: (589) 934-9947 Email: info@Shape Collage.TARGET BRAZIL Website: http://www.Shape Collage.org/site     4  [facility] Food Shelf Distance: 1.31 miles      In-Person   [address] [facility] [address] [place] [address]  Language: English  Hours: Mon - Fri 10:00 AM - 12:30 PM , Mon - Tue 1:30 PM - 3:30 PM , Thu - Fri 1:30 PM - 3:30 PM  Fees: Free   Phone: (743) 832-4664 Email: info@keystoneservices.org Website: http://OneMln.org/food-shelves/     SNAP application assistance  5  [employer] [place] Distance: 0.87 miles      Phone/Virtual   [address] [facility] [address] [place] [address]  Language: English, Hmong, St [place], Guatemalan, Greek  Hours: Mon - Fri 8:30 AM - 4:30 PM  Fees: Free   Phone: (577) 232-8120 Email: helpline@hungersolutions.org Website: https://www.hungersolutions.org/programs/[place]-food-helpline/     6  [place] Department of Human Services - MNFoodHelper (SNAP) Distance: 1.6 miles      Phone/Virtual   [address] [address] [place] [address]  Language: English, Hmong, [place], Guatemalan, Greek, Romanian  Hours: Mon - Fri 9:00 AM - 5:00 PM  Fees: Free   Phone: (189) 358-8110 Website: https://[place].gov/dhs/people-we-serve/adults/economic-assistance/food-nutrition/programs-and-services/supplemental-nutrition-assistance-program.jsp     Soup kitchen or free meals  7  [facility] & Programs Distance: 1.61 miles      In-Person   [address] [place] [address]  Language: English  Hours: Mon - Sun 6:30 AM - 7:30 AM , Mon - Sun 12:00 PM - 1:00 PM , Mon - Sun 5:30 PM - 6:30 PM  Fees: Free   Phone: (866) 997-4906 Email: info@[facility].Shipwire Website: https://[facility].Shipwire/about-us/contact/     8  [facility] [place] [facility] - Free Summer Meals Distance: 1.9 miles      In-Person   [address] [place] [address]  Language: English, Hmong, Greek  Hours: Mon - Fri 12:00 PM - 1:00 PM , Mon - Fri 3:00 PM - 4:00 PM  Fees: Free   Phone: (611) 596-9962 Email: Olga@.[facility]. Website: https://www.[facility]/departments/lucas-recreation/[facility]          Hotlines and Helplines       Hotline - Housing crisis  9  [facility] \[facility]\"" Distance: 7.14 miles      Phone/Virtual   [address] [place] [address]  Language: English  Hours: Mon - Sun Open 24 Hours   Phone: (198) 237-5230 Email: communications@oscs-mn.org Website: https://oscs-mn.org/oursaviourshousing/     10  [facility] Distance: 8.74 miles      Phone/Virtual   [address] [place] [address]  Language: English   Hours: Mon - Sun Open 24 Hours   Phone: (978) 605-9887 Email: info@iFlipd.Profit Point Website: http://www.iFlipd.org          Housing       Coordinated Entry access point  11  [facility] Distance: 1.6 miles      In-Person, Phone/Virtual   [address] [place] [address]  Language: English, Uzbek  Hours: Mon - Fri 8:30 AM - 4:30 PM  Fees: Free   Phone: (623) 169-6339 Email: info@mnMapluck.Profit Point Website: https://www.[facility].org/locations/[facility]-clinic/     12  [facility] - Coordinated Access to Housing and Shelter (CAHS) - Coordinated Access Distance: 2.01 miles      In-Person, Phone/Virtual   [address] [place] [address]  Language: English  Hours: Mon - Fri 8:00 AM - 4:30 PM  Fees: Free   Phone: (641) 756-5737 Website: https://www.[facility]./residents/assistance-support/assistance/housing-services-support     Drop-in center or day shelter  13  [facility] Distance: 2.13 miles      In-Person   [address] [place] [address]  Language: English  Hours: Mon - Fri 9:00 AM - 4:00 PM  Fees: Free   Phone: (919) 241-2977 Email: sergio@Reaqua Systems Website: http://Reaqua Systems     14  [facility] [place] [facility] Distance: 7.16 miles      In-Person   [address] [place] [address]  Language: English, Kittitian, Uzbek  Hours: Mon - Sat 7:00 AM - 3:00 PM  Fees: Free, Self Pay   Phone: (828) 760-3756 Email: info@SKINNYprice.Profit Point Website: https://www.SKINNYprice.org/locations/opportunity-center/     Housing search assistance  15  Affordable GetBulb Online - https://Cinarra Systems/ Distance: 7.31 miles      Phone/Virtual   [address] [place] [address]  Language: English  Hours: Mon - Sun Open 24 Hours   Email: info@Urbita Website: https://Cinarra Systems     16  HousingLink - Online housing search assistance  Distance: 8.46 miles      Phone/Virtual   [employer] [address] [address]  Language: English, Hmong, Indian, Grenadian  Hours: Mon - Sun Open 24 Hours   Phone: (987) 142-8620 Email: info@housinglink.org Website: http://www.housinglink.org/     Shelter for families  17  [facility] Distance: 14.46 miles      In-Person   [address] [facility] [place] [address]  Language: English  Hours: Mon - Fri 3:00 PM - 9:00 AM , Sat - Sun Open 24 Hours  Fees: Free   Phone: (704) 790-8293 Ext.1 Website: https://www.saintandrews.org/2020/07/03/emergency-family-shelter/     Shelter for individuals  18  [facility] [place] - [facility] [place] [facility] - [facility] [place] [facility] Distance: 1.54 miles      In-Person   [address] [place] [address]  Language: English  Hours: Mon - Sun 5:00 PM - 10:00 AM  Fees: Free, Self Pay   Phone: (578) 585-9855 Email: info@KoalaDeal.Meru Networks Website: https://www.[facility].org/locations/higher-[facility]-saint-paul/     19  [facility] Distance: 7.14 miles      In-Person   [address] [place] [address]  Language: English  Hours: Mon - Sun Open 24 Hours  Fees: Free   Phone: (901) 402-3731 Email: communications@[facility]mn.org Website: https://[facility]-mn.org/oursaviourshousing/          Transportation       Free or low-cost transportation  20  [employer]s Distance: 3.94 miles      In-Person   [address] [place] [address]  Language: English, Grenadian  Hours: Mon 9:00 AM - 5:00 PM , Tue 9:30 AM - 7:00 PM , Wed 9:00 AM - 5:00 PM , Thu 9:30 AM - 7:00 PM , Fri 9:00 AM - 5:00 PM  Fees: Free   Phone: (282) 242-7836 Email: beckie@NovaPlanner Website: http://www.NovaPlanner     21  DARTS - The LOOP - Vito Circulator Bus Distance: 5.29 miles      In-Person   [address] [place] [address]  Language: English  Hours: Tue 9:00 AM - 2:00 PM  Fees: Self Pay   Phone: (234) 315-5993  Email: info@darLotaris1.org Website: http://www.darLotarisconnects.org/     Transportation to medical appointments  22  [facility] - Non-Emergency Medical Transportation Distance: 1.83 miles      In-Person   [address] [place] [address]  Language: English  Hours: Mon - Fri 8:00 AM - 4:00 PM Appt. Only  Fees: Self Pay   Phone: (596) 878-1652 Website: http://www.[facility].org/Medical-Services/Emergency-medical-services/Non-emergency-transportation/     23  Discover Ride Distance: 3.1 miles      In-Person   [address] [address]  Language: English  Hours: Mon - Thu 6:00 AM - 6:00 PM , Fri 6:00 AM - 5:00 PM  Fees: Insurance, Self Pay   Phone: (529) 606-5149 Email: office@NextDocs Website: https://www.NextDocs/          Important Numbers & Websites       [address].org  Poison Control   (461) 233-4102 [place]poison.org  Suicide and Crisis Lifeline   [address] [place], [place] [address]line.org  Childhelp [place] Child Abuse Hotline   606.776.6270 Childhelphotline.org  National Sexual Assault Hotline   (179) 473-5861 (HOPE) Rainn.org  National Runaway Safeline   (845) 359-1915 (RUNAWAY) [facility] and Clinicsrunaway.org  Pregnancy & Postpartum Support [place]   Call/text 440-402-0013 Ppsupportmn.org  Substance Abuse National Helpline ([facility]   374-002-HELP (9730) Findtreatment.gov  Emergency Services   911
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
## 2023-10-19 NOTE — COMMUNITY RESOURCES LIST (ENGLISH)
10/19/2023   [facility] - Outpatient Clinics  N/A  For additional resource needs, please contact your health insurance member services or your primary care team.  Phone: [phone]   Email: N/A   Address: [facility] [place] [address]   Hours: N/A        Financial Stability       Rent and mortgage payment assistance  1  [facility] - Security Deposit Assistance Distance: 1.71 miles      In-Person   [address] [facility] [address] [place] [address]  Language: English  Hours: Mon - Fri 8:00 AM - 4:30 PM  Fees: Free   Phone: (163) 884-4837 Email: windypdesyusef@[facility]. Website: https://www.[facility]./[facility]-government/departments/health-and-wellness     2  [facility] [place] - Multifamily Rental Assistance Program Distance: 1.76 miles      Phone/Virtual   [address] [facility] [address] [place] [address]  Language: English  Hours: Mon - Fri 8:00 AM - 5:00 PM Appt. Only  Fees: Free   Phone: (896) 718-9974 Email: mn.Stylenda@[facility]. Website: https://www.[facility] Hospitalsing.gov/index.html          Food and Nutrition       Food pantry  3  J[facility]eta [facility]. Distance: 1.28 miles      Delivery, [facility]   [address] [place] [address]  Language: English, Citizen of [place]  Hours: Mon 9:00 AM - 6:00 PM Appt. Only, Tue - Fri 9:00 AM - 5:00 PM Appt. Only  Fees: Free   Phone: (234) 234-8319 Email: info@ThinkCERCA.Skytree Digital Website: http://www.ThinkCERCA.org/site     4  [facility] - baseclick Food Shelf Distance: 1.31 miles      In-Person   [address] [facility] 3 [place] [address]  Language: English  Hours: Mon - Fri 10:00 AM - 12:30 PM , Mon - Tue 1:30 PM - 3:30 PM , Thu - Fri 1:30 PM - 3:30 PM  Fees: Free   Phone: (762) 881-3076 Email: info@keystoneservices.org Website: http://Pump Audio.org/food-shelves/     SNAP application assistance  5  [employer] [place] Distance: 0.87 miles      Phone/Virtual   [address] [facility] [address] [place] [address]  Language: English, Hmong, Central African, Taiwanese, Sami  Hours: Mon - Fri 8:30 AM - 4:30 PM  Fees: Free   Phone: (481) 110-7475 Email: helpline@hungersolutions.org Website: https://www.hungersolutions.org/programs/[place]-food-helpline/     6  [place] Department of Human Services - MNFoodHelper (SNAP) Distance: 1.6 miles      Phone/Virtual   [address] [address] [place] [address]  Language: English, Hmong, Central African, Taiwanese, Sami, Malay  Hours: Mon - Fri 9:00 AM - 5:00 PM  Fees: Free   Phone: (191) 727-9839 Website: https://[place].gov/dhs/people-we-serve/adults/economic-assistance/food-nutrition/programs-and-services/supplemental-nutrition-assistance-program.jsp     Soup kitchen or free meals  7  [facility] & Programs Distance: 1.61 miles      In-Person   [address] [place] [address]  Language: English  Hours: Mon - Sun 6:30 AM - 7:30 AM , Mon - Sun 12:00 PM - 1:00 PM , Mon - Sun 5:30 PM - 6:30 PM  Fees: Free   Phone: (732) 272-9148 Email: info@[facility].Skubana Website: https://[facility].Skubana/about-us/contact/     8  [facility] [place] [facility] - Free Summer Meals Distance: 1.9 miles      In-Person   [address] [place] [address]  Language: English, Hmong, Sami  Hours: Mon - Fri 12:00 PM - 1:00 PM , Mon - Fri 3:00 PM - 4:00 PM  Fees: Free   Phone: (710) 911-3352 Email: Olga@.[facility]. Website: https://www.Lists of hospitals in the [place].[facility]/departments/lucas-recreation/[place]-Count includes the [facility]          Hotlines and Helplines       Hotline - Housing crisis  9  [facility] Distance: 7.14 miles      Phone/Virtual   [address] [place] [address]  Language: English  Hours: Mon - Sun Open 24 Hours   Phone: (210) 308-7887 Email: communications@oscs-mn.org Website: https://oscs-mn.org/oursaviourshousing/     10  [facility] Distance: 8.74 miles      Phone/Virtual   [address] [place] [address]  Language: English   Hours: Mon - Sun Open 24 Hours   Phone: (573) 122-7812 Email: info@InspireMD.Cloud Floor Website: http://www.InspireMD.org          Housing       Coordinated Entry access point  11  [facility] Distance: 1.6 miles      In-Person, Phone/Virtual   [address] [place] [address]  Language: English, Citizen of the [place]  Hours: Mon - Fri 8:30 AM - 4:30 PM  Fees: Free   Phone: (349) 471-1249 Email: info@mnPollenizer.Cloud Floor Website: https://www.[facility].org/locations/[facility]-clinic/     12  [facility] - Coordinated Access to Housing and Shelter (CAHS) - Coordinated Access Distance: 2.01 miles      In-Person, Phone/Virtual   [address] [place] [address]  Language: English  Hours: Mon - Fri 8:00 AM - 4:30 PM  Fees: Free   Phone: (179) 142-2273 Website: https://www.[facility]./residents/assistance-support/assistance/housing-services-support     Drop-in center or day shelter  13  [facility] Distance: 2.13 miles      In-Person   [address] [place] [address]  Language: English  Hours: Mon - Fri 9:00 AM - 4:00 PM  Fees: Free   Phone: (110) 998-2647 Email: sergio@SysClass Website: http://SysClass     14  [facility] Opportunity [facility] Distance: 7.16 miles      In-Person   [address] [place] [address]  Language: English, Guamanian, Citizen of the [place]  Hours: Mon - Sat 7:00 AM - 3:00 PM  Fees: Free, Self Pay   Phone: (178) 893-1165 Email: info@Watkins Hire.Cloud Floor Website: https://www.Watkins Hire.org/locations/opportunity-center/     Housing search assistance  15  Affordable Carvoyant Online - https://Euro Dream Heat/ Distance: 7.31 miles      Phone/Virtual   [address] [place] [address]  Language: English  Hours: Mon - Sun Open 24 Hours   Email: info@blueKiwi Software Website: https://Euro Dream Heat     16  HousingLink - Online housing search assistance  Distance: 8.46 miles      Phone/Virtual   [employer] [address] [address]  Language: English, Hmong, South Korean, Citizen of [place]iss[place]  Hours: Mon - Sun Open 24 Hours   Phone: (596) 899-7277 Email: info@housinglink.org Website: http://www.housinglink.org/     Shelter for families  17  [facility] Distance: 14.46 miles      In-Person   [address] [facility] [place] [address]  Language: English  Hours: Mon - Fri 3:00 PM - 9:00 AM , Sat - Sun Open 24 Hours  Fees: Free   Phone: (972) 321-2076 Ext.1 Website: https://www.saintandrews.org/2020/07/03/emergency-family-shelter/     Shelter for individuals  18  [facility] [place] - [facility] [place] [facility] - [facility] [place] [facility] Distance: 1.54 miles      In-Person   [address] [place] [address]  Language: English  Hours: Mon - Sun 5:00 PM - 10:00 AM  Fees: Free, Self Pay   Phone: (457) 467-6198 Email: info@StageMark.Advanced Vector Analytics Website: https://www.[facility].org/locations/higher-[facility]-saint-paul/     19  [facility] Distance: 7.14 miles      In-Person   [address] [place] [address]  Language: English  Hours: Mon - Sun Open 24 Hours  Fees: Free   Phone: (999) 649-5699 Email: communications@[facility]-mn.org Website: https://[facility]-mn.org/oursaviourshousing/          Transportation       Free or low-cost transportation  20  Small Sums Distance: 3.94 miles      In-Person   [address] [place] [address]  Language: English, Citizen of [place]  Hours: Mon 9:00 AM - 5:00 PM , Tue 9:30 AM - 7:00 PM , Wed 9:00 AM - 5:00 PM , Thu 9:30 AM - 7:00 PM , Fri 9:00 AM - 5:00 PM  Fees: Free   Phone: (811) 595-7966 Email: beckie@Archipelago Website: http://www.Archipelago     21  DARTS - The LOOP - Vito Circulator Bus Distance: 5.29 miles      In-Person   [address] [place] [address]  Language: English  Hours: Tue 9:00 AM - 2:00 PM  Fees: Self Pay   Phone: (552) 639-8524  Email: info@darVirtual Psychology Systems1.org Website: http://www.darVirtual Psychology Systemsconnects.org/     Transportation to medical appointments  22  [facility] - Non-Emergency Medical Transportation Distance: 1.83 miles      In-Person   [address] [place] [address]  Language: English  Hours: Mon - Fri 8:00 AM - 4:00 PM Appt. Only  Fees: Self Pay   Phone: (584) 898-3853 Website: http://www.[facility].org/Medical-Services/Emergency-medical-services/Non-emergency-transportation/     23  Discover Ride Distance: 3.1 miles      In-Person   [address] [address]  Language: English  Hours: Mon - Thu 6:00 AM - 6:00 PM , Fri 6:00 AM - 5:00 PM  Fees: Insurance, Self Pay   Phone: (990) 579-2615 Email: office@[employer]erie Pharmaceuticals Website: https://www.Aerie Pharmaceuticals/          Important Numbers & Websites       [address].org  Poison Control   (308) 794-1206 [place]poison.org  Suicide and Crisis Lifeline   [address] [place], [place] [address]line.org  Childhelp [place] Child Abuse Hotline   536.352.3172 Childhelphotline.org  National Sexual Assault Hotline   (204) 821-2135 (HOPE) Rainn.org  National Runaway Safeline   (335) 117-3201 (RUNAWAY) [facility] Fallsrunaway.org  Pregnancy & Postpartum Support [place]   Call/text 552-044-2621 Ppsupportmn.org  Substance Abuse National Helpline ([facility]   165-084-HELP (8360) Findtreatment.gov  Emergency Services   911

## 2023-10-27 NOTE — TELEPHONE ENCOUNTER
MEDICAL RECORDS REQUEST   [facility]  Urology Clinic  [address] [place] [address]  PHONE: [phone]  Fax: [phone]        FUTURE VISIT INFORMATION                                                   CHAYA Renee: 1980 scheduled for future visit at [facility]    APPOINTMENT INFORMATION:  Date: 2024  Provider:  Arcenio Felix MD  Reason for Visit/Diagnosis: ERECTILE DYSFUNCTION      RECORDS REQUESTED FOR VISIT                                                     NOTES  STATUS/DETAILS   OFFICE NOTE from other specialist  yes, 2021 -- Louis Rowley DO @    DISCHARGE REPORT from the ER  yes, 2023 --  ED   MEDICATION LIST  yes     PRE-VISIT CHECKLIST      Joint diagnostic appointment coordinated correctly          (ensure right order & amount of time) Yes   RECORD COLLECTION COMPLETE Yes

## 2023-11-20 ENCOUNTER — TELEPHONE (OUTPATIENT)
Dept: FAMILY MEDICINE | Facility: CLINIC | Age: 43
End: 2023-11-20
Payer: COMMERCIAL
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
## 2023-12-14 ENCOUNTER — TELEPHONE (OUTPATIENT)
Dept: FAMILY MEDICINE | Facility: CLINIC | Age: 43
End: 2023-12-14
Payer: COMMERCIAL

## 2023-12-19 NOTE — TELEPHONE ENCOUNTER
RN called patient, unable to reach patient, left a VM.     After chart reviewed, it looks like patient still has refills on his diabetes medications. The glipizide should cover him until next month. The metformin, should cover him until February. The eyedrop the olopatadine, he just got prescribed in end of October, and it looks like he was given 3 refills of that.    If patient calls back, please confirm which medications he's wanting to refill and if it's the ones mentioned above please let patient know of refills and have patient call pharmacy to double check on refills.        Elisa Tirado, MSN, RN   [facility]

## 2023-12-21 ENCOUNTER — PRE VISIT (OUTPATIENT)
Dept: UROLOGY | Facility: CLINIC | Age: 43
End: 2023-12-21

## 2023-12-21 NOTE — TELEPHONE ENCOUNTER
Called patient, unable to reach patient, left voicemail.    If patient calls back, please confirm/clarify which medications he's wanting to refill, it looks like patient still have refills left on medication he's requesting, have patient call pharmacy to double check on refills.      Elisa Tirado, MSN, RN   [facility]

## 2023-12-26 NOTE — TELEPHONE ENCOUNTER
Spoke with pt regarding medication refill request. Asked pt to reach out to his Pharmacy regarding refill request, as it appears pt still has remaining refills left.    Patient verbalizes understanding, agrees with plan and has no further questions.    Closing encounter.    MERLY MendezN, RN   [facility]

## 2024-01-05 ENCOUNTER — PRE VISIT (OUTPATIENT)
Dept: UROLOGY | Facility: CLINIC | Age: 44
End: 2024-01-05
Payer: COMMERCIAL

## 2024-01-05 NOTE — CONFIDENTIAL NOTE
Reason for visit: consult     Relevant information: erectile dysfunction    Records/imaging/labs/orders: in epic    At Rooming: yolis Santizo  1/5/2024  2:38 PM

## 2024-01-07 DIAGNOSIS — E11.65 TYPE 2 DIABETES MELLITUS WITH HYPERGLYCEMIA, WITH LONG-TERM CURRENT USE OF INSULIN (H): ICD-10-CM

## 2024-01-07 DIAGNOSIS — Z79.4 TYPE 2 DIABETES MELLITUS WITH HYPERGLYCEMIA, WITH LONG-TERM CURRENT USE OF INSULIN (H): ICD-10-CM

## 2024-01-07 DIAGNOSIS — Z76.0 ENCOUNTER FOR MEDICATION REFILL: ICD-10-CM

## 2024-01-08 RX ORDER — ATORVASTATIN CALCIUM 20 MG/1
TABLET, FILM COATED ORAL
Qty: 30 TABLET | Refills: 0 | Status: SHIPPED | OUTPATIENT
Start: 2024-01-08 | End: 2024-02-06

## 2024-02-06 DIAGNOSIS — Z76.0 ENCOUNTER FOR MEDICATION REFILL: ICD-10-CM

## 2024-02-06 DIAGNOSIS — Z79.4 TYPE 2 DIABETES MELLITUS WITH HYPERGLYCEMIA, WITH LONG-TERM CURRENT USE OF INSULIN (H): ICD-10-CM

## 2024-02-06 DIAGNOSIS — E11.65 TYPE 2 DIABETES MELLITUS WITH HYPERGLYCEMIA, WITH LONG-TERM CURRENT USE OF INSULIN (H): ICD-10-CM

## 2024-02-06 RX ORDER — ATORVASTATIN CALCIUM 20 MG/1
TABLET, FILM COATED ORAL
Qty: 30 TABLET | Refills: 0 | Status: SHIPPED | OUTPATIENT
Start: 2024-02-06

## 2024-03-15 NOTE — TELEPHONE ENCOUNTER
Reason for Call: Provider Communication    Return Phone Number: [phone]     Who is calling:  Samson Kay    Facility provider is associated with:  [facility]    Reason for call:  Samson Kay called to notify provider that this patient has legal issues that need to be resolved before he would be eligible for the N648 piece. Does provider want to wait and do everything together or should patient be seen for Neuro Psych testing and services and do the n648 piece later when he has resolved his legal issues? Please call Samson Kay to advise.    Urgency for return call:  As available    Okay to leave detailed message?:  Yes    Call taken on 5/26/2022 at 10:20 AM by Bakari Bernal         non-distended

## 2024-04-14 DIAGNOSIS — E11.00 TYPE 2 DIABETES MELLITUS WITH HYPEROSMOLARITY WITHOUT COMA, WITHOUT LONG-TERM CURRENT USE OF INSULIN (H): ICD-10-CM

## 2024-04-15 RX ORDER — PEN NEEDLE, DIABETIC 32GX 5/32"
NEEDLE, DISPOSABLE MISCELLANEOUS
Qty: 100 EACH | Refills: 3 | OUTPATIENT
Start: 2024-04-15

## 2024-04-15 RX ORDER — INSULIN DETEMIR 100 [IU]/ML
INJECTION, SOLUTION SUBCUTANEOUS
Qty: 15 ML | Refills: 3 | OUTPATIENT
Start: 2024-04-15

## 2024-04-15 NOTE — CONFIDENTIAL NOTE
Patient refused to take insulin, converted to all oral medications. Virtual visi 10/2023, Has not followed up in person for DM since 3/2023      Need follow up if wants to continue medications.       Peggy Ortiz MD

## 2024-04-30 ENCOUNTER — TELEPHONE (OUTPATIENT)
Dept: FAMILY MEDICINE | Facility: CLINIC | Age: 44
End: 2024-04-30
Payer: COMMERCIAL

## 2024-04-30 NOTE — TELEPHONE ENCOUNTER
Patient Quality Outreach    Patient is due for the following:   Diabetes -  A1C, Eye Exam, Microalbumin, Statin, Diabetic Follow-Up Visit, and Foot Exam    Next Steps:   Schedule a office visit for DM    Type of outreach:    Phone, left message for patient/parent to call back.    Next Steps:  Reach out within 90 days via Phone.    Max number of attempts reached: No. Will try again in 90 days if patient still on fail list.    Questions for provider review:    None           Enzo Chapman  Chart routed to Care Team.

## 2024-05-01 DIAGNOSIS — E11.00 TYPE 2 DIABETES MELLITUS WITH HYPEROSMOLARITY WITHOUT COMA, WITHOUT LONG-TERM CURRENT USE OF INSULIN (H): ICD-10-CM

## 2024-05-23 ENCOUNTER — MEDICAL CORRESPONDENCE (OUTPATIENT)
Dept: HEALTH INFORMATION MANAGEMENT | Facility: CLINIC | Age: 44
End: 2024-05-23

## 2024-08-19 ENCOUNTER — TELEPHONE (OUTPATIENT)
Dept: FAMILY MEDICINE | Facility: CLINIC | Age: 44
End: 2024-08-19
Payer: COMMERCIAL

## 2024-08-19 NOTE — TELEPHONE ENCOUNTER
Patient Quality Outreach    Patient is due for the following:   Diabetes -  A1C, Microalbumin, Diabetic Follow-Up Visit, and Foot Exam    Next Steps:   Schedule a office visit for Diabetes follow up    Type of outreach:    Phone, left message for patient/parent to call back. To confirm if he has moved to different city/clinic.    Next Steps:  Reach out within 90 days via Phone.    Max number of attempts reached: No. Will try again in 90 days if patient still on fail list.    Questions for provider review:    None           Enzo Chapman  Chart routed to Care Team.

## 2024-08-20 ENCOUNTER — TELEPHONE (OUTPATIENT)
Dept: FAMILY MEDICINE | Facility: CLINIC | Age: 44
End: 2024-08-20
Payer: COMMERCIAL

## 2024-08-20 NOTE — TELEPHONE ENCOUNTER
FYI - Status Update    Who is Calling: patient    Update: Pt returning clinic call, with an updated that he is currently going to a clinic in [place]. No return call needed. Thanks.     Does caller want a call/response back: No

## 2024-10-18 DIAGNOSIS — F32.2 SEVERE MAJOR DEPRESSION WITHOUT PSYCHOTIC FEATURES (H): ICD-10-CM

## 2024-10-18 RX ORDER — MIRTAZAPINE 7.5 MG/1
7.5 TABLET, FILM COATED ORAL AT BEDTIME
Qty: 90 TABLET | Refills: 3 | Status: SHIPPED | OUTPATIENT
Start: 2024-10-18

## 2025-03-14 ENCOUNTER — HOSPITAL ENCOUNTER (OUTPATIENT)
Facility: HOSPITAL | Age: 45
Setting detail: OBSERVATION
Discharge: HOME | End: 2025-03-15
Attending: EMERGENCY MEDICINE | Admitting: INTERNAL MEDICINE
Payer: MEDICAID

## 2025-03-14 ENCOUNTER — APPOINTMENT (OUTPATIENT)
Dept: INTERPRETER SERVICES | Facility: CLINIC | Age: 45
End: 2025-03-14
Payer: MEDICAID

## 2025-03-14 DIAGNOSIS — E11.65 TYPE 2 DIABETES MELLITUS WITH HYPERGLYCEMIA, WITHOUT LONG-TERM CURRENT USE OF INSULIN (H): Primary | ICD-10-CM

## 2025-03-14 DIAGNOSIS — E11.65 HYPERGLYCEMIA DUE TO DIABETES MELLITUS (H): ICD-10-CM

## 2025-03-14 LAB
ALBUMIN SERPL BCG-MCNC: 4.1 G/DL (ref 3.5–5.2)
ALP SERPL-CCNC: 82 U/L (ref 40–150)
ALT SERPL W P-5'-P-CCNC: 53 U/L (ref 0–70)
ANION GAP SERPL CALCULATED.3IONS-SCNC: 9 MMOL/L (ref 7–15)
AST SERPL W P-5'-P-CCNC: 29 U/L (ref 0–45)
BASE EXCESS BLDV CALC-SCNC: 1.4 MMOL/L (ref -3–3)
BASOPHILS # BLD AUTO: 0.1 10E3/UL (ref 0–0.2)
BASOPHILS NFR BLD AUTO: 2 %
BILIRUB SERPL-MCNC: 0.7 MG/DL
BUN SERPL-MCNC: 12.6 MG/DL (ref 6–20)
CALCIUM SERPL-MCNC: 9.1 MG/DL (ref 8.8–10.4)
CHLORIDE SERPL-SCNC: 99 MMOL/L (ref 98–107)
CREAT SERPL-MCNC: 0.83 MG/DL (ref 0.67–1.17)
EGFRCR SERPLBLD CKD-EPI 2021: >90 ML/MIN/1.73M2
EOSINOPHIL # BLD AUTO: 0.7 10E3/UL (ref 0–0.7)
EOSINOPHIL NFR BLD AUTO: 13 %
ERYTHROCYTE [DISTWIDTH] IN BLOOD BY AUTOMATED COUNT: 11.7 % (ref 10–15)
GLUCOSE SERPL-MCNC: 614 MG/DL (ref 70–99)
HCO3 BLDV-SCNC: 28 MMOL/L (ref 21–28)
HCO3 SERPL-SCNC: 27 MMOL/L (ref 22–29)
HCT VFR BLD AUTO: 41.8 % (ref 40–53)
HGB BLD-MCNC: 14.8 G/DL (ref 13.3–17.7)
HOLD SPECIMEN: NORMAL
IMM GRANULOCYTES # BLD: 0 10E3/UL
IMM GRANULOCYTES NFR BLD: 0 %
LYMPHOCYTES # BLD AUTO: 1.7 10E3/UL (ref 0.8–5.3)
LYMPHOCYTES NFR BLD AUTO: 29 %
MAGNESIUM SERPL-MCNC: 2.1 MG/DL (ref 1.7–2.3)
MCH RBC QN AUTO: 31.8 PG (ref 26.5–33)
MCHC RBC AUTO-ENTMCNC: 35.4 G/DL (ref 31.5–36.5)
MCV RBC AUTO: 90 FL (ref 78–100)
MONOCYTES # BLD AUTO: 0.4 10E3/UL (ref 0–1.3)
MONOCYTES NFR BLD AUTO: 8 %
NEUTROPHILS # BLD AUTO: 2.8 10E3/UL (ref 1.6–8.3)
NEUTROPHILS NFR BLD AUTO: 49 %
NRBC # BLD AUTO: 0 10E3/UL
NRBC BLD AUTO-RTO: 0 /100
O2/TOTAL GAS SETTING VFR VENT: 21 %
OXYHGB MFR BLDV: 43 % (ref 70–75)
PCO2 BLDV: 53 MM HG (ref 40–50)
PH BLDV: 7.34 [PH] (ref 7.32–7.43)
PLATELET # BLD AUTO: 241 10E3/UL (ref 150–450)
PO2 BLDV: 26 MM HG (ref 25–47)
POTASSIUM SERPL-SCNC: 3.8 MMOL/L (ref 3.4–5.3)
PROT SERPL-MCNC: 7.3 G/DL (ref 6.4–8.3)
RBC # BLD AUTO: 4.66 10E6/UL (ref 4.4–5.9)
SAO2 % BLDV: 43.7 % (ref 70–75)
SODIUM SERPL-SCNC: 135 MMOL/L (ref 135–145)
WBC # BLD AUTO: 5.7 10E3/UL (ref 4–11)

## 2025-03-14 PROCEDURE — 83735 ASSAY OF MAGNESIUM: CPT | Performed by: EMERGENCY MEDICINE

## 2025-03-14 PROCEDURE — 250N000012 HC RX MED GY IP 250 OP 636 PS 637: Performed by: EMERGENCY MEDICINE

## 2025-03-14 PROCEDURE — 250N000013 HC RX MED GY IP 250 OP 250 PS 637: Performed by: EMERGENCY MEDICINE

## 2025-03-14 PROCEDURE — 82962 GLUCOSE BLOOD TEST: CPT

## 2025-03-14 PROCEDURE — 99285 EMERGENCY DEPT VISIT HI MDM: CPT | Mod: 25

## 2025-03-14 PROCEDURE — 83036 HEMOGLOBIN GLYCOSYLATED A1C: CPT | Performed by: INTERNAL MEDICINE

## 2025-03-14 PROCEDURE — 82805 BLOOD GASES W/O2 SATURATION: CPT | Performed by: EMERGENCY MEDICINE

## 2025-03-14 PROCEDURE — 96361 HYDRATE IV INFUSION ADD-ON: CPT

## 2025-03-14 PROCEDURE — G0378 HOSPITAL OBSERVATION PER HR: HCPCS

## 2025-03-14 PROCEDURE — 36415 COLL VENOUS BLD VENIPUNCTURE: CPT | Performed by: EMERGENCY MEDICINE

## 2025-03-14 PROCEDURE — 85025 COMPLETE CBC W/AUTO DIFF WBC: CPT | Performed by: EMERGENCY MEDICINE

## 2025-03-14 PROCEDURE — 258N000003 HC RX IP 258 OP 636: Performed by: EMERGENCY MEDICINE

## 2025-03-14 PROCEDURE — 80053 COMPREHEN METABOLIC PANEL: CPT | Performed by: EMERGENCY MEDICINE

## 2025-03-14 RX ORDER — OLOPATADINE HYDROCHLORIDE 1 MG/ML
1 SOLUTION/ DROPS OPHTHALMIC ONCE
Status: COMPLETED | OUTPATIENT
Start: 2025-03-14 | End: 2025-03-14

## 2025-03-14 RX ADMIN — OLOPATADINE HYDROCHLORIDE 1 DROP: 1 SOLUTION OPHTHALMIC at 22:52

## 2025-03-14 RX ADMIN — METFORMIN HYDROCHLORIDE 1000 MG: 500 TABLET, FILM COATED ORAL at 23:07

## 2025-03-14 RX ADMIN — SODIUM CHLORIDE 1000 ML: 0.9 INJECTION, SOLUTION INTRAVENOUS at 22:03

## 2025-03-14 RX ADMIN — INSULIN ASPART 10 UNITS: 100 INJECTION, SOLUTION INTRAVENOUS; SUBCUTANEOUS at 23:09

## 2025-03-14 ASSESSMENT — ACTIVITIES OF DAILY LIVING (ADL)
ADLS_ACUITY_SCORE: 41
ADLS_ACUITY_SCORE: 41

## 2025-03-14 ASSESSMENT — ENCOUNTER SYMPTOMS: FATIGUE: 1

## 2025-03-14 ASSESSMENT — COLUMBIA-SUICIDE SEVERITY RATING SCALE - C-SSRS
1. IN THE PAST MONTH, HAVE YOU WISHED YOU WERE DEAD OR WISHED YOU COULD GO TO SLEEP AND NOT WAKE UP?: NO
6. HAVE YOU EVER DONE ANYTHING, STARTED TO DO ANYTHING, OR PREPARED TO DO ANYTHING TO END YOUR LIFE?: NO
2. HAVE YOU ACTUALLY HAD ANY THOUGHTS OF KILLING YOURSELF IN THE PAST MONTH?: NO

## 2025-03-15 VITALS
OXYGEN SATURATION: 100 % | DIASTOLIC BLOOD PRESSURE: 72 MMHG | HEART RATE: 72 BPM | TEMPERATURE: 97.8 F | WEIGHT: 118.17 LBS | SYSTOLIC BLOOD PRESSURE: 114 MMHG | RESPIRATION RATE: 18 BRPM | HEIGHT: 62 IN | BODY MASS INDEX: 21.75 KG/M2

## 2025-03-15 LAB
ANION GAP SERPL CALCULATED.3IONS-SCNC: 6 MMOL/L (ref 7–15)
BUN SERPL-MCNC: 9 MG/DL (ref 6–20)
CALCIUM SERPL-MCNC: 8.4 MG/DL (ref 8.8–10.4)
CHLORIDE SERPL-SCNC: 107 MMOL/L (ref 98–107)
CREAT SERPL-MCNC: 0.65 MG/DL (ref 0.67–1.17)
EGFRCR SERPLBLD CKD-EPI 2021: >90 ML/MIN/1.73M2
ERYTHROCYTE [DISTWIDTH] IN BLOOD BY AUTOMATED COUNT: 11.7 % (ref 10–15)
EST. AVERAGE GLUCOSE BLD GHB EST-MCNC: 272 MG/DL
GLUCOSE BLDC GLUCOMTR-MCNC: 108 MG/DL (ref 70–99)
GLUCOSE BLDC GLUCOMTR-MCNC: 176 MG/DL (ref 70–99)
GLUCOSE BLDC GLUCOMTR-MCNC: 204 MG/DL (ref 70–99)
GLUCOSE BLDC GLUCOMTR-MCNC: 292 MG/DL (ref 70–99)
GLUCOSE BLDC GLUCOMTR-MCNC: 575 MG/DL (ref 70–99)
GLUCOSE SERPL-MCNC: 217 MG/DL (ref 70–99)
HBA1C MFR BLD: 11.1 %
HCO3 SERPL-SCNC: 24 MMOL/L (ref 22–29)
HCT VFR BLD AUTO: 37.8 % (ref 40–53)
HGB BLD-MCNC: 13.6 G/DL (ref 13.3–17.7)
INR PPP: 1.09 (ref 0.85–1.15)
MCH RBC QN AUTO: 32.2 PG (ref 26.5–33)
MCHC RBC AUTO-ENTMCNC: 36 G/DL (ref 31.5–36.5)
MCV RBC AUTO: 90 FL (ref 78–100)
PLATELET # BLD AUTO: 201 10E3/UL (ref 150–450)
POTASSIUM SERPL-SCNC: 3.4 MMOL/L (ref 3.4–5.3)
RBC # BLD AUTO: 4.22 10E6/UL (ref 4.4–5.9)
SODIUM SERPL-SCNC: 137 MMOL/L (ref 135–145)
WBC # BLD AUTO: 7.8 10E3/UL (ref 4–11)

## 2025-03-15 PROCEDURE — 82310 ASSAY OF CALCIUM: CPT | Performed by: INTERNAL MEDICINE

## 2025-03-15 PROCEDURE — 82962 GLUCOSE BLOOD TEST: CPT

## 2025-03-15 PROCEDURE — 85610 PROTHROMBIN TIME: CPT | Performed by: INTERNAL MEDICINE

## 2025-03-15 PROCEDURE — 36415 COLL VENOUS BLD VENIPUNCTURE: CPT | Performed by: INTERNAL MEDICINE

## 2025-03-15 PROCEDURE — 85014 HEMATOCRIT: CPT | Performed by: INTERNAL MEDICINE

## 2025-03-15 PROCEDURE — 250N000011 HC RX IP 250 OP 636: Performed by: INTERNAL MEDICINE

## 2025-03-15 PROCEDURE — G0378 HOSPITAL OBSERVATION PER HR: HCPCS

## 2025-03-15 PROCEDURE — 99236 HOSP IP/OBS SAME DATE HI 85: CPT | Performed by: INTERNAL MEDICINE

## 2025-03-15 PROCEDURE — 80048 BASIC METABOLIC PNL TOTAL CA: CPT | Performed by: INTERNAL MEDICINE

## 2025-03-15 PROCEDURE — 250N000013 HC RX MED GY IP 250 OP 250 PS 637: Performed by: INTERNAL MEDICINE

## 2025-03-15 PROCEDURE — 96374 THER/PROPH/DIAG INJ IV PUSH: CPT

## 2025-03-15 PROCEDURE — 99207 PR APP CREDIT; MD BILLING SHARED VISIT: CPT | Performed by: INTERNAL MEDICINE

## 2025-03-15 RX ORDER — GLIPIZIDE 5 MG/1
5 TABLET, FILM COATED, EXTENDED RELEASE ORAL
Status: DISCONTINUED | OUTPATIENT
Start: 2025-03-15 | End: 2025-03-15 | Stop reason: HOSPADM

## 2025-03-15 RX ORDER — AMOXICILLIN 250 MG
2 CAPSULE ORAL 2 TIMES DAILY PRN
Status: DISCONTINUED | OUTPATIENT
Start: 2025-03-15 | End: 2025-03-15 | Stop reason: HOSPADM

## 2025-03-15 RX ORDER — DEXTROSE MONOHYDRATE 25 G/50ML
25-50 INJECTION, SOLUTION INTRAVENOUS
Status: DISCONTINUED | OUTPATIENT
Start: 2025-03-15 | End: 2025-03-15 | Stop reason: HOSPADM

## 2025-03-15 RX ORDER — AMOXICILLIN 250 MG
1 CAPSULE ORAL 2 TIMES DAILY PRN
Status: DISCONTINUED | OUTPATIENT
Start: 2025-03-15 | End: 2025-03-15 | Stop reason: HOSPADM

## 2025-03-15 RX ORDER — ONDANSETRON 4 MG/1
4 TABLET, ORALLY DISINTEGRATING ORAL EVERY 6 HOURS PRN
Status: DISCONTINUED | OUTPATIENT
Start: 2025-03-15 | End: 2025-03-15 | Stop reason: HOSPADM

## 2025-03-15 RX ORDER — ONDANSETRON 2 MG/ML
4 INJECTION INTRAMUSCULAR; INTRAVENOUS EVERY 6 HOURS PRN
Status: DISCONTINUED | OUTPATIENT
Start: 2025-03-15 | End: 2025-03-15

## 2025-03-15 RX ORDER — GLIPIZIDE 5 MG/1
5 TABLET, FILM COATED, EXTENDED RELEASE ORAL
Qty: 30 TABLET | Refills: 0 | Status: SHIPPED | OUTPATIENT
Start: 2025-03-16

## 2025-03-15 RX ORDER — ONDANSETRON 2 MG/ML
4 INJECTION INTRAMUSCULAR; INTRAVENOUS EVERY 6 HOURS PRN
Status: DISCONTINUED | OUTPATIENT
Start: 2025-03-15 | End: 2025-03-15 | Stop reason: HOSPADM

## 2025-03-15 RX ORDER — NICOTINE POLACRILEX 4 MG
15-30 LOZENGE BUCCAL
Status: DISCONTINUED | OUTPATIENT
Start: 2025-03-15 | End: 2025-03-15 | Stop reason: HOSPADM

## 2025-03-15 RX ORDER — GLIPIZIDE 5 MG/1
5 TABLET, FILM COATED, EXTENDED RELEASE ORAL
Qty: 30 TABLET | Refills: 0 | Status: SHIPPED | OUTPATIENT
Start: 2025-03-16 | End: 2025-03-15

## 2025-03-15 RX ORDER — PROCHLORPERAZINE MALEATE 10 MG
10 TABLET ORAL EVERY 6 HOURS PRN
Status: DISCONTINUED | OUTPATIENT
Start: 2025-03-15 | End: 2025-03-15 | Stop reason: HOSPADM

## 2025-03-15 RX ADMIN — METFORMIN HYDROCHLORIDE 1000 MG: 500 TABLET, FILM COATED ORAL at 08:12

## 2025-03-15 RX ADMIN — GLIPIZIDE 5 MG: 5 TABLET, FILM COATED, EXTENDED RELEASE ORAL at 08:12

## 2025-03-15 RX ADMIN — ONDANSETRON 4 MG: 2 INJECTION, SOLUTION INTRAMUSCULAR; INTRAVENOUS at 02:35

## 2025-03-15 ASSESSMENT — ACTIVITIES OF DAILY LIVING (ADL)
ADLS_ACUITY_SCORE: 22
ADLS_ACUITY_SCORE: 22
ADLS_ACUITY_SCORE: 18
ADLS_ACUITY_SCORE: 22
ADLS_ACUITY_SCORE: 41
ADLS_ACUITY_SCORE: 18
ADLS_ACUITY_SCORE: 18
DEPENDENT_IADLS:: INDEPENDENT;TRANSPORTATION
ADLS_ACUITY_SCORE: 22

## 2025-03-15 NOTE — H&P
[facility]    History and Physical - Hospitalist Service       Date of Admission:  3/14/2025    Assessment & Plan      Pablo Russo is a 44 year old male admitted on 3/14/2025. He has PMH most notable for uncontrolled type 2 diabetes, adjustment disorder, chronic hepatitis C that presents with blurry vision and fatigue due to uncontrolled hyperglycemia.    #Severe hyperglycemia  #Uncontrolled type 2 diabetes  The patient states he has been off his diabetes medications for 2 years.  At the time of presentation the patient's glucose was 614 and there was no evidence of DKA or HHS.  He received 10 units of aspart, 1 L IV fluids and 1000 mg metformin in the ED.  Glucose improved to 292.  Per the ER physicians report the patient is restricted to a [place] pharmacy and clinic.  The clinic and pharmacy are closed this weekend so the hope is that the patient can be admitted and then discharged with 30 days of medications and follow-up with his primary care doctor.  Complicating the matter is a significant language barrier and poor medical literacy in the setting of a complex medical system.  Therefore it would probably be safest for the patient to try and avoid being discharged insulin if possible.  -S/p 1000 g of metformin in the ED, will continue at 1000 mg twice daily (has been on this dose before)  -Restart glipizide XL 24-hour release 5 mg daily  -Obtain HgbA1c  -Social work consult  -The patient was asking for juice in the ED, educated the patient that juice is not optimal in the setting of diabetes  -Reviewing upcoming encounters there is a family practice appointment scheduled for 03/31/2025.  He did have an appointment on 03/18 but this was canceled due to a scheduling error    #Fatigue  #Blurry vision  The patient states his symptoms have improved since his glucose has decreased from 600->292.    #Adjustment disorder  -Not on any medications for this         Observation Goals: -diagnostic  tests and consults completed and resulted, -vital signs normal or at patient baseline, -tolerating oral intake to maintain hydration, Nurse to notify provider when observation goals have been met and patient is ready for discharge.  Diet: Regular Diet Adult    DVT Prophylaxis: Ambulate  Moyer Catheter: Not present  Lines: None     Cardiac Monitoring: None  Code Status: Full Code discussed with the patient using a Radha     Clinically Significant Risk Factors Present on Admission                                        Disposition Plan     Medically Ready for Discharge: Anticipated Today           Luke Matson MD  Hospitalist Service  [facility]  Securely message with [facility] (more info)  Text page via [facility] Paging/Directory     ______________________________________________________________________    Chief Complaint   Blurry vision and fatigue    History is obtained from the patient using a Radha     History of Present Illness   Hsa ARLEY Russo is a 44 year old male who has PMH most notable for uncontrolled type 2 diabetes, adjustment disorder, chronic hepatitis C.    The patient tells me he has been off his diabetes medications for 2 years.  He endorses chronic fatigue and chronic blurry vision in both of his eyes.  Per the ER physicians report the patient has been seen at [facility] for similar issues but they have been unable to fill his outpatient prescriptions due to the fact the patient is restricted to a specific [place] pharmacy.  He is also restricted to the [facility] and no other primary care doctor is able to see him.  For some unknown reason he called his doctor in the evening for concerns of hyperglycemia and he was told to come to the ER.    The patient was found to be hyperglycemic to 614.  Anion gap, potassium, pH normal.  WBC normal.  He was given 1 L IVF, 10 units of aspart and 1 g of metformin.  His glucose improved to  292.    The patient was seen after these medications were administered and he tells me his symptoms have significantly improved.  He denies chest pain, dyspnea, abdominal pain, vomiting, diarrhea, urinary complaints.  He keeps asking for juice.    Due to the fact the ER provider cannot fill his outpatients prescriptions as the patient is restricted to a specific [place] pharmacy the patient was admitted for social concerns, arrangement of pertinent follow-up and ensuring that the patient will be discharged with his diabetes medications.        Past Medical History    Past Medical History:   Diagnosis Date    Immune to hepatitis B 2/12/2020 2/11/2020 - Immune from prior infection    Immune to hepatitis B 2/12/2020 2/11/2020 - Immune from prior infection       Past Surgical History   No past surgical history on file.    Prior to Admission Medications   None        Review of Systems    The 10 point Review of Systems is negative other than noted in the HPI or here.     Social History   I have reviewed this patient's social history and updated it with pertinent information if needed.  Social History     Tobacco Use    Smoking status: Never     Passive exposure: Never    Smokeless tobacco: Never   Vaping Use    Vaping status: Never Used   Substance Use Topics    Alcohol use: Yes    Drug use: Not Currently         Family History     No significant family history, including no history of: Immunodeficiencies      Allergies   No Known Allergies     Physical Exam   Vital Signs: Temp: 98  F (36.7  C) Temp src: Oral BP: 138/78 Pulse: 87   Resp: 18 SpO2: 97 %      Weight: 119 lbs 0 oz    GENERAL: Lying in bed, no distress, appears stated age   HEENT: NC/AT, sclera anicteric   CV: RRR, no M/R/G, CR < 2 s   PULM: CTAB, no wheezes, rales, rhonchi   GI: Abd soft, NT, ND  MSK: WWP, no LE edema   NEURO: Awake, alert, oriented to 3/15/2025, CN II-XII grossly intact, CORADO, appears nonfocal  SKIN: no rash       Medical Decision  Making       60 MINUTES SPENT BY ME on the date of service doing chart review, history, exam, documentation & further activities per the note.      Data     I have personally reviewed the following data over the past 24 hrs:    5.7  \   14.8   / 241     135 99 12.6 /  292 (H)   3.8 27 0.83 \     ALT: 53 AST: 29 AP: 82 TBILI: 0.7   ALB: 4.1 TOT PROTEIN: 7.3 LIPASE: N/A       Imaging results reviewed over the past 24 hrs:   No results found for this or any previous visit (from the past 24 hours).

## 2025-03-15 NOTE — PLAN OF CARE
Goal Outcome Evaluation:      Plan of Care Reviewed With: patient          Outcome Evaluation: Goal is home with medications to be delivered, cab voucher for transport.

## 2025-03-15 NOTE — CONSULTS
Care Management Initial Consult    General Information  Assessment completed with: Patient, Hsa  Type of CM/SW Visit: Initial Assessment    Primary Care Provider verified and updated as needed: Yes   Readmission within the last 30 days: no previous admission in last 30 days      Reason for Consult: discharge planning  Advance Care Planning:            Communication Assessment  Patient's communication style: spoken language (non-English)    Hearing Difficulty or Deaf: no   Wear Glasses or Blind: no    Cognitive  Cognitive/Neuro/Behavioral: WDL  Level of Consciousness: alert  Arousal Level: opens eyes spontaneously  Orientation: oriented x 4  Mood/Behavior: calm, cooperative  Best Language: 0 - No aphasia  Speech: clear    Living Environment:   People in home: sibling(s)  Hsar  Current living Arrangements: house      Able to return to prior arrangements: yes  Living Arrangement Comments: Will need cab voucher    Family/Social Support:  Care provided by: self  Provides care for: no one  Marital Status: Single  Support system: Sibling(s)          Description of Support System:           Current Resources:   Patient receiving home care services: No        Community Resources: None  Equipment currently used at home: none  Supplies currently used at home: None    Employment/Financial:  Employment Status:          Financial Concerns: none   Referral to Financial Worker: No       Does the patient's insurance plan have a 3 day qualifying hospital stay waiver?  No    Lifestyle & Psychosocial Needs:  Social Drivers of Health     Food Insecurity: Food Insecurity Present (11/23/2024)    Received from [facility]    Hunger Vital Sign     Worried About Running Out of Food in the Last Year: Sometimes true     Ran Out of Food in the Last Year: Sometimes true   Depression: Not at risk (10/2/2024)    Received from [facility]    PHQ-2     PHQ-2 Score: 0   Housing Stability: High Risk (11/23/2024)    Received from [facility]     Housing Stability Vital Sign     Unable to Pay for Housing in the Last Year: Yes     Number of Times Moved in the Last Year: Not on file     Homeless in the Last Year: Yes   Tobacco Use: Low Risk  (1/27/2025)    Received from [employer]    Patient History     Smoking Tobacco Use: Never     Smokeless Tobacco Use: Never     Passive Exposure: Not on file   Financial Resource Strain: Low Risk  (10/19/2023)    Financial Resource Strain     Within the past 12 months, have you or your family members you live with been unable to get utilities (heat, electricity) when it was really needed?: No   Alcohol Use: Not At Risk (3/10/2022)    AUDIT-C     Frequency of Alcohol Consumption: Never     Average Number of Drinks: Not on file     Frequency of Binge Drinking: Never   Transportation Needs: Unmet Transportation Needs (11/23/2024)    Received from [employer]    PRAPARE - Transportation     Lack of Transportation (Medical): Yes     Lack of Transportation (Non-Medical): Yes   Physical Activity: Insufficiently Active (3/10/2022)    Exercise Vital Sign     Days of Exercise per Week: 5 days     Minutes of Exercise per Session: 10 min   Interpersonal Safety: Not At Risk (2/18/2025)    Received from [employer]    Humiliation, Afraid, Rape, and Kick questionnaire     Fear of Current or Ex-Partner: No     Emotionally Abused: No     Physically Abused: No     Sexually Abused: No   Stress: Stress Concern Present (3/10/2022)    [employer] - Occupational Stress Questionnaire     Feeling of Stress : To some extent   Social Connections: Moderately Integrated (3/10/2022)    Social Connection and Isolation Panel [NHANES]     Frequency of Communication with Friends and Family: More than three times a week     Frequency of Social Gatherings with Friends and Family: More than three times a week     Attends Druze Services: More than 4 times per year     Active Member of Clubs or Organizations: No      Attends Club or Organization Meetings: 1 to 4 times per year     Marital Status: Never    Health Literacy: Not on file       Functional Status:  Prior to admission patient needed assistance:   Dependent ADLs:: Independent  Dependent IADLs:: Independent, Transportation       Mental Health Status:  Mental Health Status: No Current Concerns       Chemical Dependency Status:  Chemical Dependency Status: No Current Concerns             Values/Beliefs:  Spiritual, Cultural Beliefs, Methodist Practices, Values that affect care: no          Values/Beliefs Comment: Clifford    Discussed  Partnership in Safe Discharge Planning  document with patient/family: No    Additional Information:  Writer met with patient with assistance of Radha  via I-pad (ID [id number]) to assess need for any possible services at discharge. Patient alert, answering questions appropriately and engaged in the conversation.  Address, phone number and PCP confirmed. Address updated on demographics. Patient states his PCP is Dr. Ortiz at [facility].   Patient lives with his brother and is independent with activities of daily living at baseline.    Per MD, patient has not been taking his medications for diabetes and will have them filled to be delivered here prior to discharge.   Primary care follow up (vs establishment as patient has had several no-shows with Dr. Ortiz) set for March 24th at 1300 but patient also has a visit with Dr. Ortiz for March 31st. They will leave them both active for now.   Patient is requesting a cab voucher ride to home. Update provided to charge RN.    Next Steps:  CM will continue to monitor progression of care, review team recommendations and provide discharge planning assist as needed.      Fallon Davenport RN

## 2025-03-15 NOTE — CARE PLAN
"PRIMARY DIAGNOSIS: \"GENERIC\" NURSING  OUTPATIENT/OBSERVATION GOALS TO BE MET BEFORE DISCHARGE:  ADLs back to baseline: Yes    Activity and level of assistance: Ambulating independently.    Pain status: Pain free.    Return to near baseline physical activity: Yes     Discharge Planner Nurse   Safe discharge environment identified: Yes  Barriers to discharge: No       Entered by: Codey Cai RN 03/15/2025 4:22 AM     Please review provider order for any additional goals.   Nurse to notify provider when observation goals have been met and patient is ready for discharge.  "

## 2025-03-15 NOTE — DISCHARGE SUMMARY
[facility]  Hospitalist Discharge Summary      Date of Admission:  3/14/2025  Date of Discharge:  3/15/2025  Discharging Provider: Levi Deleon MD  Discharge Service: Hospitalist Service    Discharge Diagnoses   DM-II, uncontrolled, due to medication non-adherence     Clinically Significant Risk Factors     # DMII: A1C = 11.1 % (Ref range: <5.7 %) within past 6 months       Follow-ups Needed After Discharge   Follow-up Appointments       Hospital to Primary Care - Establish PCP Referral      Please be aware that coverage of these services is subject to the terms and limitations of your health insurance plan.  Call member services at your health plan with any benefit or coverage questions.    Schedule Primary Care visit within: 7 Days   Recommended labs and Imaging (to be ordered by Primary Care Provider): None, however please review glucometer readings and adjust diabetic meds appropriately   Additional Information: Please set follow up with the [facility]               Discharge Disposition   Discharged to home  Condition at discharge: Fair    Hospital Course   DM-II, Uncontrolled, Due to medication non-adherence  - he has not taken diabetic medications >2 years, due to lack of insurance coverage.  He now has medicaid.  - he presented with a BS >600, however no DKA or HHS  Lab Results   Component Value Date    A1C 11.1 03/14/2025    A1C 12.3 03/08/2023    A1C 12.4 11/17/2022    A1C 7.2 07/26/2022    A1C 10.7 04/27/2022   - he was given insulin and started on OHA, and his BS did approve to a reasonable range for discharge with close outpatient follow up  - he will discharge on Glucophage, Glucotrol, and a glucometer and we are working on follow up in clinic this week    - as he has been diabetic, he voices that he can use a glucometer   - he is being referred to diabetic education as well at discharge     H/O Hep C  - LFTs normal at admit        Consultations This Hospital Stay    SOCIAL WORK IP CONSULT    Code Status   Full Code    Time Spent on this Encounter   I, Levi Deleon MD, personally saw the patient today and spent greater than 30 minutes discharging this patient.       Levi Deleon MD  [facility]  [address] [place] [id number]  Phone: [phone]  Fax: [phone]  ______________________________________________________________________    Physical Exam   Vital Signs: Temp: 97.8  F (36.6  C) Temp src: Oral BP: 114/72 Pulse: 72   Resp: 18 SpO2: 100 % O2 Device: None (Room air)    Weight: 118 lbs 2.66 oz  ----------------------------------------------------------------------------------------       Primary Care Physician   Physician No Ref-Primary    Discharge Orders      Hospital to Primary Care - Establish PCP Referral      Adult Diabetes Education  Referral      Reason for your hospital stay    Uncontrolled Diabetes     Activity    Your activity upon discharge: activity as tolerated     Diet    Follow this diet upon discharge: Current Diet:Orders Placed This Encounter      Low Carbohydrate Diet       Significant Results and Procedures   Most Recent 3 CBC's:  Recent Labs   Lab Test 03/15/25  0702 03/14/25 2149 01/20/23  1441   WBC 7.8 5.7 8.0   HGB 13.6 14.8 16.2   MCV 90 90 92    241 218     Most Recent 3 BMP's:  Recent Labs   Lab Test 03/15/25  0810 03/15/25  0702 03/15/25  0607 03/15/25  0002 03/14/25 2149 01/20/23  1441   NA  --  137  --   --  135 136   POTASSIUM  --  3.4  --   --  3.8 4.1   CHLORIDE  --  107  --   --  99 99   CO2  --  24  --   --  27 27   BUN  --  9.0  --   --  12.6 7.4   CR  --  0.65*  --   --  0.83 0.67   ANIONGAP  --  6*  --   --  9 10   TANVI  --  8.4*  --   --  9.1 9.7   * 217* 176*   < > 614* 310*    < > = values in this interval not displayed.     Most Recent 2 LFT's:  Recent Labs   Lab Test 03/14/25  2149 01/20/23  1441   AST 29 33   ALT 53 54*   ALKPHOS 82  83   BILITOTAL 0.7 1.3*   ,   Results for orders placed or performed during the hospital encounter of 01/20/23   XR Chest 2 Views    Narrative    EXAM: XR CHEST 2 VIEWS  LOCATION: [facility]  DATE/TIME: 1/20/2023 3:09 PM    INDICATION: cp  COMPARISON: 12/15/2022      Impression    IMPRESSION: Negative chest.       Discharge Medications   Current Discharge Medication List        START taking these medications    Details   blood glucose monitoring (NO BRAND SPECIFIED) meter device kit Use to test blood sugar 3 times daily or as directed.  Qty: 1 kit, Refills: 0    Associated Diagnoses: Type 2 diabetes mellitus with hyperglycemia, without long-term current use of insulin (H)      glipiZIDE (GLUCOTROL XL) 5 MG 24 hr tablet Take 1 tablet (5 mg) by mouth daily (with breakfast).  Qty: 30 tablet, Refills: 0    Associated Diagnoses: Type 2 diabetes mellitus with hyperglycemia, without long-term current use of insulin (H)      metFORMIN (GLUCOPHAGE) 1000 MG tablet Take 1 tablet (1,000 mg) by mouth 2 times daily (with meals).  Qty: 60 tablet, Refills: 0    Associated Diagnoses: Type 2 diabetes mellitus with hyperglycemia, without long-term current use of insulin (H)           Allergies   No Known Allergies

## 2025-03-15 NOTE — PLAN OF CARE
"  Problem: Adult Inpatient Plan of Care  Goal: Plan of Care Review  Description: The Plan of Care Review/Shift note should be completed every shift.  The Outcome Evaluation is a brief statement about your assessment that the patient is improving, declining, or no change.  This information will be displayed automatically on your shift  note.  Outcome: Progressing  Goal: Patient-Specific Goal (Individualized)  Description: You can add care plan individualizations to a care plan. Examples of Individualization might be:  \"Parent requests to be called daily at 9am for status\", \"I have a hard time hearing out of my right ear\", or \"Do not touch me to wake me up as it startles  me\".  Outcome: Progressing  Goal: Absence of Hospital-Acquired Illness or Injury  Outcome: Progressing  Intervention: Identify and Manage Fall Risk  Recent Flowsheet Documentation  Taken 3/15/2025 0114 by Codey Cai RN  Safety Promotion/Fall Prevention: activity supervised  Intervention: Prevent Skin Injury  Recent Flowsheet Documentation  Taken 3/15/2025 0114 by Codey Cai, RN  Body Position: supine, head elevated  Goal: Optimal Comfort and Wellbeing  Outcome: Progressing  Goal: Readiness for Transition of Care  Outcome: Progressing  Intervention: Mutually Develop Transition Plan  Recent Flowsheet Documentation  Taken 3/15/2025 0133 by Codey Cai RN  Equipment Currently Used at Home: none   Goal Outcome Evaluation:         Pt alert and oriented X4 Radha  used to communicate, denied pain; c/o nausea and PRN Zofran was given X 1.   Patient is on RA, no tele, VSS,  and 176.  Up ambulated independently to the toilet X2. Make needs known.      Codey STEPHENS RN                 "

## 2025-03-15 NOTE — ED NOTES
"Bed: JNED-18  Expected date:   Expected time:   Means of arrival:   Comments:  SPF hyperglycemia; \"high\" on glucometer; cristiana speaking  "

## 2025-03-15 NOTE — PROGRESS NOTES
Patient discharged to home at 1105 via [employer].  Accompanied by and staff.  Discharge instructions were reviewed with patient, opportunity offered to ask questions.    Prescriptions printed and given to patient/family.  Access discontinued: Yes  Care plan and education discontinued: Yes  Home meds retrieved from pharmacy: N/A  Belongings were sent home with patient/family:   cell phone & , pants, T-shirt, and shoes .

## 2025-03-15 NOTE — ED PROVIDER NOTES
NAME: Pablo Russo  AGE: 44 year old male  YOB: 1980  MRN: [id number]  EVALUATION DATE & TIME: 3/14/2025  9:34 PM    PCP: No Ref-Primary, Physician    ED PROVIDER: Kris Dickinson M.D.      Chief Complaint   Patient presents with    Hyperglycemia         FINAL IMPRESSION:  1. Hyperglycemia due to diabetes mellitus (H)        MEDICAL DECISION MAKIN:43 PM I met with the patient, obtained history, performed an initial exam, and discussed options and plan for diagnostics and treatment here in the ED.   Patient was clinically assessed and consented to treatment. After assessment, medical decision making and workup were discussed with the patient. The patient was agreeable to plan for testing, workup, and treatment.  Pertinent Labs & Imaging studies reviewed. (See chart for details)  10:48 PM I updated the patient and he is comfortable with being admitted.  11:03 PM I spoke with Dr. Cm, Resident at [facility] whom is unable to accept the patient under their service.  Medicine service And patient will be admitted to hospitalist.  12:23 AM patient discussed with hospitalist, Dr. Matson for admission to their service for observation.     Medical Decision Making  Obtained supplemental history:Supplemental history obtained?: Documented in chart  Reviewed external records: External records reviewed?: Documented in chart  Care impacted by chronic illness:Diabetes, Mental Health, and Other: chronic hepatitis C  Care significantly affected by social determinants of health:Access to Medical Care  Did you consider but not order tests?: In addition to work-up documented, I considered the following work up:   Did you interpret images independently?: Independent interpretation of ECG and images noted in documentation, when applicable.  Consultation discussion with other provider:Did you involve another provider (consultant, , pharmacy, etc.)?: I discussed the care with another health care provider, see  documentation for details.  Admit.  Not Applicable  None    Pablo Russo is a 44 year old male who presents with hyperglycemia.   Differential diagnosis includes but not limited to diabetic ketoacidosis, hyperglycemia, dehydration, acute kidney injury.  Patient is a 44-year-old male with history of diabetes.  Patient has been off his medications for about 2 years and has had treatment intermittently with mainly ER visits.  Patient has had some complications mostly because of being restricted to his single provider and pharmacy.  Patient's had noncompliance before and has resulted in restricted recipient program.  He does have a clinic he sees however  when calling them they instructed him to go to the ER since his sugar was over 700.  Patient would like refill of his medications but cannot fill them at specific pharmacies or nor from myself.  Patient had labs checked which did not show any gap acidosis.  VBG and further labs unremarkable.  Patient did have a sugar of 614 and will receive IV fluids, NovoLog, and restart his metformin.  With all these blood sugar came down to 292.  With this improvement I would consider discharge home if I could give patient his regular medications at home however due to the restrictions I am not able to get these over the weekend.  Patient also does not quite seem to understand the restrictions that he has in place and cannot understand after being given prescriptions from reasons why he could not fill them at [facility].  Unfortunately this complicates discharge with his regular medications and patient likely will have subsequent elevation in his blood sugar again.  Following discussion with the patient and as we are unable to get him his home medications here it was felt best for care management standpoint to keep patient in the hospital overnight and get his blood sugar down to normal ranges.  This will also allow us to determine his sliding scale needs and restart both his metformin  and glipizide.  After that case management may be all help get his medications refilled before discharge so that he has them to go home with and thus would not need to have specific restrictive process provider prescriptions to go to his restricted pharmacy.  Patient discussed with resident service initially however the patient would overflow to hospitalist due to Being from the resident team.  Patient then discussed with hospitalist for admission for observation.    0 minutes of critical care time    MEDICATIONS GIVEN IN THE EMERGENCY:  Medications   senna-docusate (SENOKOT-S/PERICOLACE) 8.6-50 MG per tablet 1 tablet (has no administration in time range)     Or   senna-docusate (SENOKOT-S/PERICOLACE) 8.6-50 MG per tablet 2 tablet (has no administration in time range)   ondansetron (ZOFRAN ODT) ODT tab 4 mg ( Oral See Alternative 3/15/25 0235)     Or   ondansetron (ZOFRAN) injection 4 mg (4 mg Intravenous $Given 3/15/25 0235)   prochlorperazine (COMPAZINE) injection 10 mg (has no administration in time range)     Or   prochlorperazine (COMPAZINE) tablet 10 mg (has no administration in time range)   metFORMIN (GLUCOPHAGE) tablet 1,000 mg (has no administration in time range)   glipiZIDE (GLUCOTROL XL) 24 hr tablet 5 mg (has no administration in time range)   glucose gel 15-30 g (has no administration in time range)     Or   dextrose 50 % injection 25-50 mL (has no administration in time range)     Or   glucagon injection 1 mg (has no administration in time range)   sodium chloride 0.9% BOLUS 1,000 mL (0 mLs Intravenous Stopped 3/14/25 2356)   olopatadine (PATANOL) 0.1 % ophthalmic solution 1 drop (1 drop Both Eyes $Given 3/14/25 2252)   metFORMIN (GLUCOPHAGE) tablet 1,000 mg (1,000 mg Oral $Given 3/14/25 2307)   insulin aspart (NovoLOG) injection (RAPID ACTING) (10 Units Subcutaneous $Given 3/14/25 2309)       NEW PRESCRIPTIONS STARTED AT TODAY'S ER VISIT:  There are no discharge medications for this patient.          =================================================================    HPI    Patient information was obtained from: the patient    Use of : Radha  provided by [employer]      Pablo Russo is a 44 year old male with a past medical history of T2DM, adjustment disorder with depressed mood, chronic hepatitis C, who presents with hyperglycemia.    The patient reports his blood sugars were high. He has was initially on metformin and received in insulin injection around 2 years ago, but notes he has been off of his medications since. He endorses chronic fatigue and blurred vision in both eyes. He has been seen at [facility] ED for similar episodes, but notes the pharmacy were unable to his prescription refills. No complaints of any other associated symptoms at this time.      REVIEW OF SYSTEMS   Review of Systems   Constitutional:  Positive for fatigue.   Eyes:  Positive for visual disturbance (bilateral, blurred vision).   All other systems reviewed and are negative.       PAST MEDICAL HISTORY:  Past Medical History:   Diagnosis Date    Immune to hepatitis B 2/12/2020 2/11/2020 - Immune from prior infection    Immune to hepatitis B 2/12/2020 2/11/2020 - Immune from prior infection       PAST SURGICAL HISTORY:  No past surgical history on file.    CURRENT MEDICATIONS:      Current Facility-Administered Medications:     glucose gel 15-30 g, 15-30 g, Oral, Q15 Min PRN **OR** dextrose 50 % injection 25-50 mL, 25-50 mL, Intravenous, Q15 Min PRN **OR** glucagon injection 1 mg, 1 mg, Subcutaneous, Q15 Min PRN, Luke Matson MD    glipiZIDE (GLUCOTROL XL) 24 hr tablet 5 mg, 5 mg, Oral, Daily with breakfast, Luke Matson MD    metFORMIN (GLUCOPHAGE) tablet 1,000 mg, 1,000 mg, Oral, BID w/meals, Luke Matson MD    ondansetron (ZOFRAN ODT) ODT tab 4 mg, 4 mg, Oral, Q6H PRN **OR** ondansetron (ZOFRAN) injection 4 mg, 4 mg, Intravenous, Q6H PRN, Luke Matson  MD Nikko, 4 mg at 03/15/25 0235    prochlorperazine (COMPAZINE) injection 10 mg, 10 mg, Intravenous, Q6H PRN **OR** prochlorperazine (COMPAZINE) tablet 10 mg, 10 mg, Oral, Q6H PRN, Luke Matson MD    senna-docusate (SENOKOT-S/PERICOLACE) 8.6-50 MG per tablet 1 tablet, 1 tablet, Oral, BID PRN **OR** senna-docusate (SENOKOT-S/PERICOLACE) 8.6-50 MG per tablet 2 tablet, 2 tablet, Oral, BID PRN, Luke Matson MD    ALLERGIES:  No Known Allergies    FAMILY HISTORY:  No family history on file.    SOCIAL HISTORY:   Social History     Socioeconomic History    Marital status: Single    Number of children: 0   Tobacco Use    Smoking status: Never     Passive exposure: Never    Smokeless tobacco: Never   Vaping Use    Vaping status: Never Used   Substance and Sexual Activity    Alcohol use: Yes    Drug use: Not Currently    Sexual activity: Not Currently     Partners: Female   Social History Narrative    6/2020:    - [place] refugee. Born in [place]. Arrived [place] in [place]. He is not yet a [place] citizen.  - Never ; no children.  - Established care with Dr. Ortiz at [facility] in June 2020.    FAMILY HISTORY  - His parents and siblings remain  in [place]    LIVING SITUATION  - Lives in a house with his grandparents, uncle, and several friends  - Household of 6    LANGUAGE(S) SPOKEN  - Radha    EMPLOYMENT  - Past employment:  at a restaurant  - Current employment: None    EDUCATION  - N one    Orthodoxy  - Orthodoxy     Social Drivers of Health     Financial Resource Strain: Low Risk  (10/19/2023)    Financial Resource Strain     Within the past 12 months, have you or your family members you live with been unable to get utilities (heat, electricity) when it was really needed?: No   Food Insecurity: Food Insecurity Present (11/23/2024)    Received from [facility]    Hunger Vital Sign     Worried About Running Out of Food in the Last Year: Sometimes true     Ran Out of Food in the Last Year:  "Sometimes true   Transportation Needs: Unmet Transportation Needs (11/23/2024)    Received from [facility]    PRAPARE - Transportation     Lack of Transportation (Medical): Yes     Lack of Transportation (Non-Medical): Yes   Physical Activity: Insufficiently Active (3/10/2022)    Exercise Vital Sign     Days of Exercise per Week: 5 days     Minutes of Exercise per Session: 10 min   Stress: Stress Concern Present (3/10/2022)    [employer] [place] [employer] - Occupational Stress Questionnaire     Feeling of Stress : To some extent   Social Connections: Moderately Integrated (3/10/2022)    Social Connection and Isolation Panel [NHANES]     Frequency of Communication with Friends and Family: More than three times a week     Frequency of Social Gatherings with Friends and Family: More than three times a week     Attends Jehovah's witness Services: More than 4 times per year     Active Member of Clubs or Organizations: No     Attends Club or Organization Meetings: 1 to 4 times per year     Marital Status: Never    Interpersonal Safety: Not At Risk (2/18/2025)    Received from [facility]    Humiliation, Afraid, Rape, and Kick questionnaire     Fear of Current or Ex-Partner: No     Emotionally Abused: No     Physically Abused: No     Sexually Abused: No   Housing Stability: High Risk (11/23/2024)    Received from [facility]    Housing Stability Vital Sign     Unable to Pay for Housing in the Last Year: Yes     Homeless in the Last Year: Yes       PHYSICAL EXAM:    Vitals: /78 (Patient Position: Semi-Cm's, Cuff Size: Adult Regular)   Pulse 87   Temp 98  F (36.7  C) (Oral)   Resp 18   Ht 1.575 m (5' 2\")   Wt 53.6 kg (118 lb 2.7 oz)   SpO2 97%   BMI 21.61 kg/m     Physical Exam  Vitals and nursing note reviewed.   Constitutional:       General: He is not in acute distress.     Appearance: Normal appearance. He is normal weight. He is not ill-appearing or toxic-appearing.   HENT:      " Head: Normocephalic.      Mouth/Throat:      Pharynx: Oropharynx is clear.   Eyes:      Conjunctiva/sclera: Conjunctivae normal.   Cardiovascular:      Rate and Rhythm: Normal rate and regular rhythm.      Heart sounds: Normal heart sounds.   Pulmonary:      Effort: Pulmonary effort is normal. No respiratory distress.      Breath sounds: Normal breath sounds.   Abdominal:      General: Abdomen is flat.      Palpations: Abdomen is soft.      Tenderness: There is no abdominal tenderness.   Musculoskeletal:      Cervical back: Normal range of motion.      Right lower leg: No edema.      Left lower leg: No edema.   Skin:     General: Skin is warm and dry.      Findings: No erythema or lesion.   Neurological:      General: No focal deficit present.      Mental Status: He is alert.   Psychiatric:         Behavior: Behavior normal.           LAB:  All pertinent labs reviewed and interpreted.  Labs Ordered and Resulted from Time of ED Arrival to Time of ED Departure   COMPREHENSIVE METABOLIC PANEL - Abnormal       Result Value    Sodium 135      Potassium 3.8      Carbon Dioxide (CO2) 27      Anion Gap 9      Urea Nitrogen 12.6      Creatinine 0.83      GFR Estimate >90      Calcium 9.1      Chloride 99      Glucose 614 (*)     Alkaline Phosphatase 82      AST 29      ALT 53      Protein Total 7.3      Albumin 4.1      Bilirubin Total 0.7     BLOOD GAS VENOUS - Abnormal    pH Venous 7.34      pCO2 Venous 53 (*)     pO2 Venous 26      Bicarbonate Venous 28      Base Excess/Deficit Venous 1.4      FIO2 21      Oxyhemoglobin Venous 43 (*)     O2 Sat, Venous 43.7 (*)    GLUCOSE BY METER - Abnormal    GLUCOSE BY METER POCT 292 (*)    MAGNESIUM - Normal    Magnesium 2.1     CBC WITH PLATELETS AND DIFFERENTIAL    WBC Count 5.7      RBC Count 4.66      Hemoglobin 14.8      Hematocrit 41.8      MCV 90      MCH 31.8      MCHC 35.4      RDW 11.7      Platelet Count 241      % Neutrophils 49      % Lymphocytes 29      % Monocytes 8       % Eosinophils 13      % Basophils 2      % Immature Granulocytes 0      NRBCs per 100 WBC 0      Absolute Neutrophils 2.8      Absolute Lymphocytes 1.7      Absolute Monocytes 0.4      Absolute Eosinophils 0.7      Absolute Basophils 0.1      Absolute Immature Granulocytes 0.0      Absolute NRBCs 0.0     GLUCOSE MONITOR NURSING POCT       RADIOLOGY:  No orders to display         PROCEDURES:   Procedures       I, Wilmer Tirado, am serving as a scribe to document services personally performed by Dr. Kris Dickinson  based on my observation and the provider's statements to me. I, Kris Dickinson MD attest that Wilmer Tirado is acting in a scribe capacity, has observed my performance of the services and has documented them in accordance with my direction.      Kris Dickinson M.D.  Emergency Medicine  [facility] Emergency Department       Kris Dickinson MD  03/15/25 1106

## 2025-03-15 NOTE — ED NOTES
[facility] ED Handoff Report    ED Chief Complaint: hyperglycemia    ED Diagnosis:  (E11.65) Hyperglycemia due to diabetes mellitus (H)  Comment: improved after Novolog  Plan: discharge tomorrow with insulin fill, scheduled appointment       PMH:    Past Medical History:   Diagnosis Date    Immune to hepatitis B 2/12/2020 2/11/2020 - Immune from prior infection    Immune to hepatitis B 2/12/2020 2/11/2020 - Immune from prior infection        Code Status:  No Order     Falls Risk: No Band: Not applicable    Current Living Situation/Residence: lives with a significant other     Elimination Status: Continent: Yes     Activity Level: Independent    Patients Preferred Language:  Other: Radha     Needed: Yes Where is the patient located?    Vital Signs:  BP (!) 161/90   Pulse 87   Temp 98  F (36.7  C) (Oral)   Resp 18   Wt 54 kg (119 lb)   SpO2 97%   BMI 21.77 kg/m       Cardiac Rhythm: n/a    Pain Score: 0/10    Is the Patient Confused:  No    Last Food or Drink: 3/14/25 at 2200    Focused Assessment:  Patient has a history of DM 2, non-compliant with medications and appointments, restricted to the [facility]. Patient arrived via EMS with complaints of hyperglycemia. BG over 600, gave Novolog with improvement. Patient admitted to be given discharge resources that he needs at this time- medications upon discharge, scheduled appointment at his clinic with date and time.     Tests Performed: Done: Labs    Treatments Provided:  insulin, fluids    Family Dynamics/Concerns: No    Family Updated On Visitor Policy: N/A    Plan of Care Communicated to Family: N/A    Who Was Updated about Plan of Care: n/a    Belongings Checklist Done and Signed by Patient: No    Medications sent with patient: Novolog    Covid: asymptomatic , n/a    Additional Information: n/a    Emily Tong RN 3/15/2025 12:27 AM

## 2025-03-15 NOTE — MEDICATION SCRIBE - ADMISSION MEDICATION HISTORY
Medication Scribe Admission Medication History    Admission medication history is complete. The information provided in this note is only as accurate as the sources available at the time of the update.    Information Source(s): Patient and CareEverywhere/SureScripts via in-person    Pertinent Information: Patient is Radha speaking.  via language line tablet utilized for interview. Pt reports not taking any medications for 2 years. Fill hx shows medications filled in past year (~6 months) including Lantus, Glipizide, Atorvastatin, Metformin, and Mirtazapine. Patient reports not taking any medications.    Changes made to PTA medication list:  Added: None  Deleted:   ALL  Tylenol PRN  Atorvastatin 20 mg  Celebrex 200 mg  Clotrimazole 1%  Glipizide XL 5 mg  Metformin 1000 mg BID  Mirtazapine 7.5 mg HS  Patanol 0.1% both eyes BID  Changed: None    Allergies reviewed with patient and updates made in EHR: yes    Medication History Completed By: Fredis Gilbert 3/14/2025 10:45 PM    No outpatient medications have been marked as taking for the 3/14/25 encounter (Hospital Encounter).

## 2025-03-15 NOTE — ED TRIAGE NOTES
"The pt presents via [facility] for evaluation of chronic hyperglycemia. Pt states \"my blood sugar is high every day.\" He has chronic generalized weakness, and dizziness. He denies taking any medication for this. He called his doctor for an unknown reason this evening and his doctor told him to go to the ER for his chronic hyperglycemia. He is also reporting numbness to bilateral lower legs, which he also states has been going on \"a long time.\"         "

## 2025-03-17 ENCOUNTER — PATIENT OUTREACH (OUTPATIENT)
Dept: CARE COORDINATION | Facility: CLINIC | Age: 45
End: 2025-03-17
Payer: MEDICAID

## 2025-03-17 NOTE — PROGRESS NOTES
[facility]    Background: Transitional Care Management program identified per system criteria and reviewed by [facility] team for possible outreach.    Assessment: Upon chart review, [facility] Team member will not proceed with patient outreach related to this episode of Transitional Care Management program due to reason below:    Patient has a follow up appointment with an appropriate provider today for hospital discharge    Patient has an appointment today with a provider in General Surgery from [facility]. No W outreach call needed at this time. Chart review only.    Plan: Transitional Care Management episode addressed appropriately per reason noted above.      SABAS Acuña  [phone]  [facility]     *Connected Care Resource Team does NOT follow patient ongoing. Referrals are identified based on internal discharge reports and the outreach is to ensure patient has an understanding of their discharge instructions.

## 2025-03-24 ENCOUNTER — OFFICE VISIT (OUTPATIENT)
Dept: FAMILY MEDICINE | Facility: CLINIC | Age: 45
End: 2025-03-24
Attending: INTERNAL MEDICINE
Payer: MEDICAID

## 2025-03-24 ENCOUNTER — APPOINTMENT (OUTPATIENT)
Dept: RADIOLOGY | Facility: HOSPITAL | Age: 45
End: 2025-03-24
Attending: EMERGENCY MEDICINE
Payer: MEDICAID

## 2025-03-24 ENCOUNTER — APPOINTMENT (OUTPATIENT)
Dept: CT IMAGING | Facility: HOSPITAL | Age: 45
End: 2025-03-24
Attending: EMERGENCY MEDICINE
Payer: MEDICAID

## 2025-03-24 ENCOUNTER — VIRTUAL VISIT (OUTPATIENT)
Dept: INTERPRETER SERVICES | Facility: CLINIC | Age: 45
End: 2025-03-24

## 2025-03-24 ENCOUNTER — HOSPITAL ENCOUNTER (EMERGENCY)
Facility: HOSPITAL | Age: 45
Discharge: HOME | End: 2025-03-24
Attending: EMERGENCY MEDICINE | Admitting: EMERGENCY MEDICINE
Payer: MEDICAID

## 2025-03-24 VITALS
HEART RATE: 80 BPM | SYSTOLIC BLOOD PRESSURE: 124 MMHG | HEIGHT: 62 IN | BODY MASS INDEX: 20.61 KG/M2 | OXYGEN SATURATION: 100 % | DIASTOLIC BLOOD PRESSURE: 78 MMHG | TEMPERATURE: 98.4 F | WEIGHT: 112 LBS | RESPIRATION RATE: 126 BRPM

## 2025-03-24 VITALS
RESPIRATION RATE: 16 BRPM | TEMPERATURE: 98 F | SYSTOLIC BLOOD PRESSURE: 108 MMHG | BODY MASS INDEX: 20.65 KG/M2 | OXYGEN SATURATION: 97 % | WEIGHT: 112.19 LBS | HEART RATE: 92 BPM | HEIGHT: 62 IN | DIASTOLIC BLOOD PRESSURE: 70 MMHG

## 2025-03-24 DIAGNOSIS — F32.2 SEVERE MAJOR DEPRESSION WITHOUT PSYCHOTIC FEATURES (H): ICD-10-CM

## 2025-03-24 DIAGNOSIS — Z78.9 NEED FOR FOLLOW-UP BY SOCIAL WORKER: ICD-10-CM

## 2025-03-24 DIAGNOSIS — E11.65 TYPE 2 DIABETES MELLITUS WITH HYPERGLYCEMIA, WITHOUT LONG-TERM CURRENT USE OF INSULIN (H): Primary | ICD-10-CM

## 2025-03-24 DIAGNOSIS — E11.65 HYPERGLYCEMIA DUE TO DIABETES MELLITUS (H): ICD-10-CM

## 2025-03-24 DIAGNOSIS — R73.9 HYPERGLYCEMIA: ICD-10-CM

## 2025-03-24 LAB
ALBUMIN UR-MCNC: NEGATIVE MG/DL
ANION GAP SERPL CALCULATED.3IONS-SCNC: 11 MMOL/L (ref 7–15)
APPEARANCE UR: CLEAR
B-OH-BUTYR SERPL-SCNC: <0.18 MMOL/L
BASE EXCESS BLDV CALC-SCNC: 3.7 MMOL/L (ref -3–3)
BILIRUB UR QL STRIP: NEGATIVE
BUN SERPL-MCNC: 9.4 MG/DL (ref 6–20)
CALCIUM SERPL-MCNC: 8.5 MG/DL (ref 8.8–10.4)
CHLORIDE SERPL-SCNC: 97 MMOL/L (ref 98–107)
COLOR UR AUTO: COLORLESS
CREAT SERPL-MCNC: 0.73 MG/DL (ref 0.67–1.17)
EGFRCR SERPLBLD CKD-EPI 2021: >90 ML/MIN/1.73M2
ERYTHROCYTE [DISTWIDTH] IN BLOOD BY AUTOMATED COUNT: 11.2 % (ref 10–15)
FLUAV RNA SPEC QL NAA+PROBE: NEGATIVE
FLUBV RNA RESP QL NAA+PROBE: NEGATIVE
GLUCOSE BLD-MCNC: >444 MG/DL (ref 60–99)
GLUCOSE BLDC GLUCOMTR-MCNC: 208 MG/DL (ref 70–99)
GLUCOSE BLDC GLUCOMTR-MCNC: 297 MG/DL (ref 70–99)
GLUCOSE BLDC GLUCOMTR-MCNC: 525 MG/DL (ref 70–99)
GLUCOSE SERPL-MCNC: 521 MG/DL (ref 70–99)
GLUCOSE UR STRIP-MCNC: >1000 MG/DL
HCO3 BLDV-SCNC: 31 MMOL/L (ref 21–28)
HCO3 SERPL-SCNC: 25 MMOL/L (ref 22–29)
HCT VFR BLD AUTO: 42.1 % (ref 40–53)
HGB BLD-MCNC: 14.9 G/DL (ref 13.3–17.7)
HGB UR QL STRIP: NEGATIVE
HOLD SPECIMEN: NORMAL
HOLD SPECIMEN: NORMAL
KETONES UR STRIP-MCNC: NEGATIVE MG/DL
LEUKOCYTE ESTERASE UR QL STRIP: NEGATIVE
MCH RBC QN AUTO: 31.6 PG (ref 26.5–33)
MCHC RBC AUTO-ENTMCNC: 35.4 G/DL (ref 31.5–36.5)
MCV RBC AUTO: 89 FL (ref 78–100)
NITRATE UR QL: NEGATIVE
O2/TOTAL GAS SETTING VFR VENT: 21 %
OXYHGB MFR BLDV: 48 % (ref 70–75)
PCO2 BLDV: 56 MM HG (ref 40–50)
PH BLDV: 7.36 [PH] (ref 7.32–7.43)
PH UR STRIP: 7 [PH] (ref 5–7)
PLATELET # BLD AUTO: 218 10E3/UL (ref 150–450)
PO2 BLDV: 29 MM HG (ref 25–47)
POTASSIUM SERPL-SCNC: 3.9 MMOL/L (ref 3.4–5.3)
RBC # BLD AUTO: 4.71 10E6/UL (ref 4.4–5.9)
RBC URINE: <1 /HPF
RSV RNA SPEC NAA+PROBE: NEGATIVE
SAO2 % BLDV: 48.6 % (ref 70–75)
SARS-COV-2 RNA RESP QL NAA+PROBE: NEGATIVE
SODIUM SERPL-SCNC: 133 MMOL/L (ref 135–145)
SP GR UR STRIP: 1.03 (ref 1–1.03)
UROBILINOGEN UR STRIP-MCNC: NORMAL MG/DL
WBC # BLD AUTO: 7 10E3/UL (ref 4–11)
WBC URINE: <1 /HPF

## 2025-03-24 PROCEDURE — 36415 COLL VENOUS BLD VENIPUNCTURE: CPT | Performed by: EMERGENCY MEDICINE

## 2025-03-24 PROCEDURE — 99285 EMERGENCY DEPT VISIT HI MDM: CPT | Mod: 25

## 2025-03-24 PROCEDURE — 87637 SARSCOV2&INF A&B&RSV AMP PRB: CPT | Performed by: EMERGENCY MEDICINE

## 2025-03-24 PROCEDURE — 250N000012 HC RX MED GY IP 250 OP 636 PS 637: Performed by: EMERGENCY MEDICINE

## 2025-03-24 PROCEDURE — 82010 KETONE BODYS QUAN: CPT | Performed by: EMERGENCY MEDICINE

## 2025-03-24 PROCEDURE — 99214 OFFICE O/P EST MOD 30 MIN: CPT | Performed by: FAMILY MEDICINE

## 2025-03-24 PROCEDURE — 85027 COMPLETE CBC AUTOMATED: CPT | Performed by: EMERGENCY MEDICINE

## 2025-03-24 PROCEDURE — 250N000011 HC RX IP 250 OP 636: Performed by: EMERGENCY MEDICINE

## 2025-03-24 PROCEDURE — 96361 HYDRATE IV INFUSION ADD-ON: CPT

## 2025-03-24 PROCEDURE — 71046 X-RAY EXAM CHEST 2 VIEWS: CPT

## 2025-03-24 PROCEDURE — 80048 BASIC METABOLIC PNL TOTAL CA: CPT | Performed by: EMERGENCY MEDICINE

## 2025-03-24 PROCEDURE — 82962 GLUCOSE BLOOD TEST: CPT

## 2025-03-24 PROCEDURE — T1013 SIGN LANG/ORAL INTERPRETER: HCPCS | Mod: U4,TEL,95

## 2025-03-24 PROCEDURE — T1013 SIGN LANG/ORAL INTERPRETER: HCPCS | Mod: U4

## 2025-03-24 PROCEDURE — 1111F DSCHRG MED/CURRENT MED MERGE: CPT | Performed by: FAMILY MEDICINE

## 2025-03-24 PROCEDURE — 258N000003 HC RX IP 258 OP 636: Performed by: EMERGENCY MEDICINE

## 2025-03-24 PROCEDURE — 74177 CT ABD & PELVIS W/CONTRAST: CPT

## 2025-03-24 PROCEDURE — 82805 BLOOD GASES W/O2 SATURATION: CPT | Performed by: EMERGENCY MEDICINE

## 2025-03-24 PROCEDURE — 3078F DIAST BP <80 MM HG: CPT | Performed by: FAMILY MEDICINE

## 2025-03-24 PROCEDURE — 80048 BASIC METABOLIC PNL TOTAL CA: CPT | Performed by: FAMILY MEDICINE

## 2025-03-24 PROCEDURE — 96374 THER/PROPH/DIAG INJ IV PUSH: CPT | Mod: 59

## 2025-03-24 PROCEDURE — 81001 URINALYSIS AUTO W/SCOPE: CPT | Performed by: EMERGENCY MEDICINE

## 2025-03-24 PROCEDURE — 36415 COLL VENOUS BLD VENIPUNCTURE: CPT | Performed by: FAMILY MEDICINE

## 2025-03-24 PROCEDURE — 96127 BRIEF EMOTIONAL/BEHAV ASSMT: CPT | Performed by: FAMILY MEDICINE

## 2025-03-24 PROCEDURE — 3074F SYST BP LT 130 MM HG: CPT | Performed by: FAMILY MEDICINE

## 2025-03-24 RX ORDER — GLIPIZIDE 5 MG/1
5 TABLET, FILM COATED, EXTENDED RELEASE ORAL
Qty: 30 TABLET | Refills: 11 | Status: SHIPPED | OUTPATIENT
Start: 2025-03-24 | End: 2025-04-03 | Stop reason: ALTCHOICE

## 2025-03-24 RX ORDER — IOPAMIDOL 755 MG/ML
55 INJECTION, SOLUTION INTRAVASCULAR ONCE
Status: COMPLETED | OUTPATIENT
Start: 2025-03-24 | End: 2025-03-24

## 2025-03-24 RX ADMIN — SODIUM CHLORIDE 1000 ML: 0.9 INJECTION, SOLUTION INTRAVENOUS at 16:41

## 2025-03-24 RX ADMIN — SODIUM CHLORIDE 1000 ML: 0.9 INJECTION, SOLUTION INTRAVENOUS at 15:41

## 2025-03-24 RX ADMIN — IOPAMIDOL 55 ML: 755 INJECTION, SOLUTION INTRAVENOUS at 17:00

## 2025-03-24 RX ADMIN — SODIUM CHLORIDE 6 UNITS: 9 INJECTION, SOLUTION INTRAVENOUS at 17:59

## 2025-03-24 ASSESSMENT — ACTIVITIES OF DAILY LIVING (ADL)
ADLS_ACUITY_SCORE: 47

## 2025-03-24 ASSESSMENT — COLUMBIA-SUICIDE SEVERITY RATING SCALE - C-SSRS
6. HAVE YOU EVER DONE ANYTHING, STARTED TO DO ANYTHING, OR PREPARED TO DO ANYTHING TO END YOUR LIFE?: NO
2. HAVE YOU ACTUALLY HAD ANY THOUGHTS OF KILLING YOURSELF IN THE PAST MONTH?: NO
1. IN THE PAST MONTH, HAVE YOU WISHED YOU WERE DEAD OR WISHED YOU COULD GO TO SLEEP AND NOT WAKE UP?: NO

## 2025-03-24 ASSESSMENT — PATIENT HEALTH QUESTIONNAIRE - PHQ9
SUM OF ALL RESPONSES TO PHQ QUESTIONS 1-9: 13
10. IF YOU CHECKED OFF ANY PROBLEMS, HOW DIFFICULT HAVE THESE PROBLEMS MADE IT FOR YOU TO DO YOUR WORK, TAKE CARE OF THINGS AT HOME, OR GET ALONG WITH OTHER PEOPLE: NOT DIFFICULT AT ALL
SUM OF ALL RESPONSES TO PHQ QUESTIONS 1-9: 13

## 2025-03-24 NOTE — ED NOTES
Expected Patient Referral to ED  2:36 PM    Referring Clinic/Provider:  Erna Villavicencio PCP    Reason for referral/Clinical facts:  Hyperglycemia.  In clinic today POC glucose >444.  Glipizide and metformin prescribed after last admission (Admitted to [facility] 3/14-3/15/25.), unable to fill for some reason. Ab pain, nausea, fatigue, no meds for 10 days.         Recommendations provided:  Send to ED for further evaluation    Caller was informed that this institution does possess the capabilities and/or resources to provide for patient and should be transferred to our facility.    Discussed that if direct admit is sought and any hurdles are encountered, this ED would be happy to see the patient and evaluate.    Informed caller that recommendations provided are recommendations based only on the facts provided and that they responsible to accept or reject the advice, or to seek a formal in person consultation as needed and that this ED will see/treat patient should they arrive.      FALLON ODOM MD  [facility] EMERGENCY DEPARTMENT  [address] [place] [id number] [phone]       Fallon Odom MD  03/24/25 5665

## 2025-03-24 NOTE — ED NOTES
Bed: JNED-26  Expected date: 3/24/25  Expected time: 3:06 PM  Means of arrival: Ambulance  Comments:  MW 44M hyperglycemia

## 2025-03-24 NOTE — PROGRESS NOTES
"  {PROVIDER CHARTING PREFERENCE:852773}  Tried to call his pharmacy to sort out issue w meds - closed 130-2 for meal break, unable to speak w someone    POC glucose too high for machine, >444. Given abdominal pain, nausea, weight loss 6 pounds since hospital discharge, report of \"sleepiness\", recommend he go to ER for symptomatic hyperglycemia. I do not recommend he drive, given his report of altered level of consciousness or awareness with hyperglycemia on 3/14, as well as possible confusion (he denies confusion, but is a poor historian today, unclear what his baseline is). Patient agrees. ER notified. EMS called for transport.     Subjective   Hsa is a 44 year old, presenting for the following health issues:  Hospital F/U ([facility] : 3/14 -3/15-DM)      3/24/2025    12:51 PM   Additional Questions   Roomed by SHERIDAN Mendez   Accompanied by self     HPI      Patient states he was unable to fill his rxs - went to pharmacy and told they were filled in [facility] - some previous ER notes mention restricted to [facility], they called to change this per notes    Wants meds for sleep, nausea, back pain, multivitamin, mental health drug, eyedrops refilled, initially states they should be in his records from [facility], then states he has not been on these for 2 years. Also reports he was previously on insulin, requests refill. Has been off x 2 years    Numbness in fingers, feet, legs    Had dental appt, drove to clinic, parking required payment so he left. Still having dental pain.     Asks for help w citizenship process - medical waiver - was getting help     Needs strips and glucometer    Feels like his sugar is really high again - feels overall weak, headache, lower abdominal/bladder area pain    Abd pain, nausea, fatigue, tired and weak, feeling \"sleepy\". Denies confusion.     Hospital Follow-up Visit:    Hospital/Nursing Home/IP Rehab Facility: {INPT AND SNF DISCHARGE:028540}  {ADMIT AND DISCHARGE INFO:226154}  Was " "the patient in the ICU or did the patient experience delirium during hospitalization?  {No_YES (delirium):589434}  Do you have any other stressors you would like to discuss with your provider? { :866735}    Problems taking medications regularly:  {:797453}  Medication changes since discharge: {:831396}  Problems adhering to non-medication therapy:  {:683774}    Summary of hospitalization:  {:410779}  Diagnostic Tests/Treatments reviewed.  Follow up needed: {:429352:}  Other Healthcare Providers Involved in Patient s Care:         {those currently involved after discharge:506984::\"None\"}  Update since discharge: {:827272} {TIP  Include information from family/caregivers, SNF, Care Coordination :320970}        Plan of care communicated with {:678238}     {Reference  Coding guidelines- Moderate Complexity F2F/Video within 7 - 14 days of discharge 5694638, High Complexity F2F/Video within 7 days 2416631 or [id number] days 0249288 :490421}      {additonal problems for provider to add (Optional):612391}     {MA/LPN/RN Pre-Provider Visit Orders- hCG/UA/Strep (Optional):718761}  {SUPERLIST (Optional):063763}  {additonal problems for provider to add (Optional):933238}    {ROS Picklists (Optional):872473}      Objective    /70 (BP Location: Left arm, Patient Position: Sitting, Cuff Size: Adult Regular)   Pulse 92   Temp 98  F (36.7  C) (Oral)   Resp 16   Ht 1.575 m (5' 2\")   Wt 50.9 kg (112 lb 3 oz)   SpO2 97%   BMI 20.52 kg/m    Body mass index is 20.52 kg/m .  Physical Exam   {Exam List (Optional):410479}    {Diagnostic Test Results (Optional):430125}        Signed Electronically by: Alta Amaya MD  {Email feedback regarding this note to [email or web address]   :205132}  .undefined[^^  " "Hyperglycemia - R73.9   Was the patient in the ICU or did the patient experience delirium during hospitalization?  No  Do you have any other stressors you would like to discuss with your provider? Health Concerns and OTHER: unable to get meds filled - restricted to different pharmacy? Requests help w citizenship waiver process - already applied but wants to know status of application     Problems taking medications regularly:  unable to get meds filled  Medication changes since discharge: None - but not taking any - never got rxs filled  Problems adhering to non-medication therapy:  None    Summary of hospitalization:  [facility] discharge summary reviewed  Diagnostic Tests/Treatments reviewed.  Follow up needed: glucose review, but patient has not had meter/supplies to check BG at home  Other Healthcare Providers Involved in Patient s Care:          DM ed referral placed at discharge   Update since discharge: worsened.         Plan of care communicated with patient                              Objective    /70 (BP Location: Left arm, Patient Position: Sitting, Cuff Size: Adult Regular)   Pulse 92   Temp 98  F (36.7  C) (Oral)   Resp 16   Ht 1.575 m (5' 2\")   Wt 50.9 kg (112 lb 3 oz)   SpO2 97%   BMI 20.52 kg/m    Body mass index is 20.52 kg/m .  Physical Exam   GENERAL: alert and no distress. Falls asleep while waiting in room, but awakens easily.   EYES: Eyes grossly normal to inspection, PERRL and conjunctivae and sclerae normal  HENT:  mouth without ulcers or lesions  NECK: no adenopathy, no asymmetry, masses, or scars  RESP: lungs clear to auscultation - no rales, rhonchi or wheezes  CV: regular rate and rhythm, normal S1 S2, no S3 or S4, no murmur, click or rub, no peripheral edema  ABDOMEN: soft, not distended; mild diffuse tenderness without guarding or rebound  MS: no gross musculoskeletal defects noted, no edema              Signed Electronically by: Alta Amaya, " MD    .undefined[^^

## 2025-03-24 NOTE — ED PROVIDER NOTES
EMERGENCY DEPARTMENT ENCOUnter      NAME: Pablo Russo  AGE: 44 year old male  YOB: 1980  MRN: [id number]  EVALUATION DATE & TIME: 3/24/2025  3:09 PM    PCP: No Ref-Primary, Physician    ED PROVIDER: Walter Rocha DO      Chief Complaint   Patient presents with    Hyperglycemia    Abdominal Pain         FINAL IMPRESSION:  1. Hyperglycemia          ED COURSE & MEDICAL DECISION MAKING:    The patient presented to the emergency department today after being found to be hyperglycemic at his clinic visit today.  He also notes some abdominal discomfort.  He was recently prescribed diabetes medications but has not picked them up yet.  Here he vitally appears comfortable.  He has some mild diffuse abdominal tenderness on exam.  Laboratory testing is notable for elevated blood sugar but no signs of DKA.  A CT of his abdomen was performed which shows no acute process.  He was given IV fluids along with a dose of IV insulin with good improvement in his blood sugar.  Given that he has his prescriptions for his diabetes medication, I feel that he can be safely discharged home with instructions for close outpatient follow-up.  He is comfortable with this plan.    Medical Decision Making  Obtained supplemental history:Supplemental history obtained?: N/A  Reviewed external records: External records reviewed?: No  Care impacted by chronic illness:Diabetes  Did you consider but not order tests?: Work up considered but not performed and documented in chart, if applicable  Did you interpret images independently?: Independent interpretation of ECG and images noted in documentation, when applicable.  Consultation discussion with other provider:Did you involve another provider (consultant, MH, pharmacy, etc.)?: No  Discharge. I recommended the patient continue their current prescription strength medication(s): Metformin. See documentation for any additional details.    MIPS (CTPE, Dental pain, Moyer, Sinusitis, Asthma/COPD,  Head Trauma): Not Applicable    SEPSIS: None        At the conclusion of the encounter I discussed the results of all of the tests and the disposition. The questions were answered. The patient or family acknowledged understanding and was agreeable with the care plan.         MEDICATIONS GIVEN IN THE EMERGENCY:  Medications   sodium chloride 0.9% BOLUS 1,000 mL (0 mLs Intravenous Stopped 3/24/25 1638)   sodium chloride 0.9% BOLUS 1,000 mL (0 mLs Intravenous Stopped 3/24/25 1749)   iopamidol (ISOVUE-370) solution 55 mL (55 mLs Intravenous $Given 3/24/25 1700)   insulin regular 1 unit/mL injection 6 Units (6 Units Intravenous $Given 3/24/25 1759)         =================================================================    HPI        Pablo Russo is a 44 year old male with a pertinent history of diabetes who presents to the emergency department today with complaints of abdominal discomfort and elevated blood sugars.  He was recently in the hospital for elevated blood sugars.  He was kept overnight and discharged with prescriptions for diabetes medications.  He has not taken diabetes medications over the past few years due to financial issues.  Since being discharged from the hospital, he has not filled his prescriptions.  He followed up with his primary doctor today and was found to have a significantly elevated blood sugar in their clinic which prompted their ER visit.  He states that he has some lower abdominal pain which has been present since he was in the hospital.  He also notes a mild cough.  No chest pain or fevers.  No other current complaints.          PAST MEDICAL HISTORY:  Past Medical History:   Diagnosis Date    Immune to hepatitis B 2/12/2020 2/11/2020 - Immune from prior infection    Immune to hepatitis B 2/12/2020 2/11/2020 - Immune from prior infection       PAST SURGICAL HISTORY:  No past surgical history on file.        CURRENT MEDICATIONS:    blood glucose monitoring (NO BRAND SPECIFIED) meter  device kit  blood glucose monitoring (NO BRAND SPECIFIED) meter device kit  glipiZIDE (GLUCOTROL XL) 5 MG 24 hr tablet  metFORMIN (GLUCOPHAGE) 1000 MG tablet  sertraline (ZOLOFT) 50 MG tablet        ALLERGIES:  No Known Allergies    FAMILY HISTORY:  No family history on file.    SOCIAL HISTORY:   Social History     Socioeconomic History    Marital status: Single    Number of children: 0   Tobacco Use    Smoking status: Never     Passive exposure: Never    Smokeless tobacco: Never   Vaping Use    Vaping status: Never Used   Substance and Sexual Activity    Alcohol use: Yes    Drug use: Not Currently    Sexual activity: Not Currently     Partners: Female   Social History Narrative    6/2020:    - [place] refugee. [place] in [place] [place] [facility]. Arrived [place] in [place]. He is not yet a [place] citizen.  - Never ; no children.  - Established care with Dr. Ortiz at [facility] in June 2020.    FAMILY HISTORY  - His parents and siblings remain  in [place]    LIVING SITUATION  - Lives in a house with his grandparents, uncle, and several friends  - Household of 6    LANGUAGE(S) SPOKEN  - Radha    EMPLOYMENT  - Past employment:  at a restaurant  - Current employment: None    EDUCATION  - N one    Sabianist  - Druze     Social Drivers of Health     Financial Resource Strain: Low Risk  (10/19/2023)    Financial Resource Strain     Within the past 12 months, have you or your family members you live with been unable to get utilities (heat, electricity) when it was really needed?: No   Food Insecurity: Food Insecurity Present (11/23/2024)    Received from [facility]    Hunger Vital Sign     Worried About Running Out of Food in the Last Year: Sometimes true     Ran Out of Food in the Last Year: Sometimes true   Transportation Needs: Unmet Transportation Needs (11/23/2024)    Received from [facility]    PRAPARE - Transportation     Lack of Transportation (Medical): Yes     Lack of Transportation (Non-Medical): Yes  "  Physical Activity: Insufficiently Active (3/10/2022)    Exercise Vital Sign     Days of Exercise per Week: 5 days     Minutes of Exercise per Session: 10 min   Stress: Stress Concern Present (3/10/2022)    [employer] - Occupational Stress Questionnaire     Feeling of Stress : To some extent   Social Connections: Moderately Integrated (3/10/2022)    Social Connection and Isolation Panel [NHANES]     Frequency of Communication with Friends and Family: More than three times a week     Frequency of Social Gatherings with Friends and Family: More than three times a week     Attends Zoroastrian Services: More than 4 times per year     Active Member of Clubs or Organizations: No     Attends Club or Organization Meetings: 1 to 4 times per year     Marital Status: Never    Interpersonal Safety: Low Risk  (3/24/2025)    Interpersonal Safety     Do you feel physically and emotionally safe where you currently live?: Yes     Within the past 12 months, have you been hit, slapped, kicked or otherwise physically hurt by someone?: No     Within the past 12 months, have you been humiliated or emotionally abused in other ways by your partner or ex-partner?: No   Housing Stability: High Risk (11/23/2024)    Received from [facility] [facility]    Housing Stability Vital Sign     Unable to Pay for Housing in the Last Year: Yes     Homeless in the Last Year: Yes       VITALS:  Patient Vitals for the past 24 hrs:   BP Temp Temp src Pulse Resp SpO2 Height Weight   03/24/25 1928 124/78 -- -- 80 (!) 126 100 % -- --   03/24/25 1828 121/77 -- -- 81 -- 99 % -- --   03/24/25 1758 108/72 -- -- 74 -- 98 % -- --   03/24/25 1743 108/69 -- -- 80 -- 98 % -- --   03/24/25 1529 125/77 -- -- 84 -- 99 % -- --   03/24/25 1519 129/85 98.4  F (36.9  C) Oral 90 19 98 % 1.575 m (5' 2\") 50.8 kg (112 lb)       PHYSICAL EXAM    Constitutional:  Well developed, Well nourished,  HENT:  Normocephalic, Atraumatic, Oropharynx moist, Nose " normal.   Eyes:  EOMI, Conjunctiva normal, No discharge.   Respiratory:  Normal breath sounds, No respiratory distress, No wheezing, No chest tenderness.   Cardiovascular:  Normal heart rate, Normal rhythm, No murmurs  GI:  Soft, mild diffuse abdominal tenderness, No guarding  Musculoskeletal:  No tenderness to palpation or major deformities noted.   Neurologic:  Alert & oriented x 3, No focal deficits noted.   Psychiatric:  Affect normal, Judgment normal, Mood normal.        LAB:  All pertinent labs reviewed and interpreted.  Results for orders placed or performed during the hospital encounter of 03/24/25                         CBC with platelets   Result Value Ref Range    WBC Count 7.0 4.0 - 11.0 10e3/uL    RBC Count 4.71 4.40 - 5.90 10e6/uL    Hemoglobin 14.9 13.3 - 17.7 g/dL    Hematocrit 42.1 40.0 - 53.0 %    MCV 89 78 - 100 fL    MCH 31.6 26.5 - 33.0 pg    MCHC 35.4 31.5 - 36.5 g/dL    RDW 11.2 10.0 - 15.0 %    Platelet Count 218 150 - 450 10e3/uL   Basic metabolic panel   Result Value Ref Range    Sodium 133 (L) 135 - 145 mmol/L    Potassium 3.9 3.4 - 5.3 mmol/L    Chloride 97 (L) 98 - 107 mmol/L    Carbon Dioxide (CO2) 25 22 - 29 mmol/L    Anion Gap 11 7 - 15 mmol/L    Urea Nitrogen 9.4 6.0 - 20.0 mg/dL    Creatinine 0.73 0.67 - 1.17 mg/dL    GFR Estimate >90 >60 mL/min/1.73m2    Calcium 8.5 (L) 8.8 - 10.4 mg/dL    Glucose 521 (HH) 70 - 99 mg/dL   UA with Microscopic reflex to Culture    Specimen: Urine, Midstream   Result Value Ref Range    Color Urine Colorless Colorless, Straw, Light Yellow, Yellow    Appearance Urine Clear Clear    Glucose Urine >1000 (A) Negative mg/dL    Bilirubin Urine Negative Negative    Ketones Urine Negative Negative mg/dL    Specific Gravity Urine 1.032 (H) 1.001 - 1.030    Blood Urine Negative Negative    pH Urine 7.0 5.0 - 7.0    Protein Albumin Urine Negative Negative mg/dL    Urobilinogen Urine Normal Normal mg/dL    Nitrite Urine Negative Negative    Leukocyte Esterase  Urine Negative Negative    RBC Urine <1 <=2 /HPF    WBC Urine <1 <=5 /HPF   Blood gas venous   Result Value Ref Range    pH Venous 7.36 7.32 - 7.43    pCO2 Venous 56 (H) 40 - 50 mm Hg    pO2 Venous 29 25 - 47 mm Hg    Bicarbonate Venous 31 (H) 21 - 28 mmol/L    Base Excess/Deficit Venous 3.7 (H) -3.0 - 3.0 mmol/L    FIO2 21     Oxyhemoglobin Venous 48 (L) 70 - 75 %    O2 Sat, Venous 48.6 (L) 70.0 - 75.0 %   Ketone Beta-Hydroxybutyrate Quantitative   Result Value Ref Range    Ketone (Beta-Hydroxybutyrate) Quantitative <0.18 <=0.30 mmol/L   Glucose by meter   Result Value Ref Range    GLUCOSE BY METER POCT 525 (HH) 70 - 99 mg/dL   Influenza A/B, RSV and SARS-CoV2 PCR (COVID-19) Nasopharyngeal    Specimen: Nasopharyngeal; Swab   Result Value Ref Range    Influenza A PCR Negative Negative    Influenza B PCR Negative Negative    RSV PCR Negative Negative    SARS CoV2 PCR Negative Negative   Extra Blue Top Tube   Result Value Ref Range    Hold Specimen JIC    Extra Red Top Tube   Result Value Ref Range    Hold Specimen JIC    Glucose by meter   Result Value Ref Range    GLUCOSE BY METER POCT 297 (H) 70 - 99 mg/dL   Glucose by meter   Result Value Ref Range    GLUCOSE BY METER POCT 208 (H) 70 - 99 mg/dL       RADIOLOGY:  I have independently reviewed and interpreted the above imaging, pending the final radiology read.  XR Chest 2 Views   Final Result   IMPRESSION: Stable chest. No acute cardiopulmonary abnormalities identified to explain patient's cough clinically.      CT Abdomen Pelvis w Contrast   Final Result   IMPRESSION:    1.  No etiology for symptoms evident.            Walter Rocha DO  Emergency Medicine  [facility] EMERGENCY DEPARTMENT  [address] [place] [id number] [phone]  Dept: [phone]     Walter Rocha DO  03/24/25 4357

## 2025-03-24 NOTE — DISCHARGE INSTRUCTIONS
Your blood sugar was found to be elevated today.  Take your previously prescribed diabetes medications as directed and follow-up with your primary care doctor.  Return to the ER for any worsening symptoms or other concerns.

## 2025-03-24 NOTE — ED TRIAGE NOTES
Patient presents with [facility] for hyperglycemia, abdominal pain from clinic follow up today. Glucose in 400s today and 466 with EMS. Upon arrival glucose was 525. Approx 10 days ago was at clinic and found same dx and sx. He was placed on metformin and glipizide which he has not been able to get because of financial concerns. He has not been insulin for 2 years due to this same concern. Endorses nausea, dizziness, frequent urination. Denies CP, V/D. VSS

## 2025-03-25 ENCOUNTER — PATIENT OUTREACH (OUTPATIENT)
Dept: CARE COORDINATION | Facility: CLINIC | Age: 45
End: 2025-03-25
Payer: MEDICAID

## 2025-03-25 NOTE — ED NOTES
Using interpretor discharge instructions and education gone through with patient with teach back. Is understanding to call PCP and make appointment for follow up. Is agreeable to plan and no further questions

## 2025-03-25 NOTE — PROGRESS NOTES
Clinic Care Coordination Contact  Community Health Worker Initial Outreach    CHW Initial Information Gathering:  Referral Source: PCP  Preferred Hospital: [facility]  [phone]  Preferred Urgent Care: [facility], [phone]  Current living arrangement:: I live in a private home with family  Type of residence:: Private home - stairs  Community Resources: None  Supplies Currently Used at Home: Diabetic Supplies  Equipment Currently Used at Home: none  Informal Support system:: Family, Friends  No PCP office visit in Past Year: No  Transportation means:: Medical transport  CHW Additional Questions  If ED/Hospital discharge, follow-up appointment scheduled as recommended?: N/A  Medication changes made following ED/Hospital discharge?: N/A  MyChart active?: No  Patient agreeable to assistance with activating MyChart?: No    Patient accepts CC: Yes. Patient scheduled for assessment with CCC RN on 4/14/2025 at 10:00 AM. Patient noted desire to discuss citizenship process, medical complex and legal issues.     Attempted to get his citizenship but failed because he had too many legal issues  (Owes money to IRS, hit and run in [place] without a drivers license, hit and run in [place] probably with a suspended license)     We cannot assist patient with citizenship until he clears up the legal issues. If he calls requesting assistance, we literally cannot help him pursue citizenship until legal issues are cleared

## 2025-04-01 NOTE — PROGRESS NOTES
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 He has not had medications in well over a year  - start lantus 16 unit(s) if covered (increased from previous)   - start sertraline at 50     Insurance coverage problems  Calling [facility] to see if we can get anything changed. Was given a different number to call:     [place] restricted recipient program   [phone]  Ask for changing pharmacy but that closed at 4pm. Will have to call tomorrow.      MED REC REQUIRED  Post Medication Reconciliation Status: patient was not discharged from an inpatient facility or TCU      Return in about 1 month (around 5/2/2025).      Subjective   Hsa is a 44 year old, presenting for the following health issues:  Hospital F/U    [facility]          Hospital Follow-up Visit:    Hospital/Nursing Home/IP Rehab Facility: [facility]  Most Recent Admission Date: 3/24/2025   Most Recent Admission Diagnosis:      Most Recent Discharge Date: 3/24/2025   Most Recent Discharge Diagnosis: Hyperglycemia - R73.9   Was the patient in the ICU or did the patient experience delirium during hospitalization?  No  Do you have any other stressors you would like to discuss with your provider? Health Concerns    Problems taking medications regularly:  restricted pharmacy and restricted provider - see a/p   Medication changes since discharge: None  Problems adhering to non-medication therapy:  restricted pharmacy and restricted provider - see a/p       Summary of hospitalization:  [facility] discharge summary reviewed  Diagnostic Tests/Treatments reviewed.  Follow up needed: none  Other Healthcare Providers Involved in Patient s Care:          "None  Update since discharge: worsened.         Plan of care communicated with patient                        Objective    /74   Pulse 82   Temp 98.1  F (36.7  C) (Oral)   Resp 16   Ht 1.55 m (5' 1.02\")   Wt 51 kg (112 lb 6.4 oz)   SpO2 98%   BMI 21.22 kg/m    Body mass index is 21.22 kg/m .  Physical Exam   GENERAL: alert and no distress  NECK: no adenopathy, no asymmetry, masses, or scars  RESP: lungs clear to auscultation - no rales, rhonchi or wheezes  CV: regular rate and rhythm, normal S1 S2, no S3 or S4, no murmur, click or rub, no peripheral edema  ABDOMEN: soft, nontender, no hepatosplenomegaly, no masses and bowel sounds normal  MS: no gross musculoskeletal defects noted, no edema    Admission on 03/24/2025, Discharged on 03/24/2025   Component Date Value Ref Range Status    WBC Count 03/24/2025 7.0  4.0 - 11.0 10e3/uL Final    RBC Count 03/24/2025 4.71  4.40 - 5.90 10e6/uL Final    Hemoglobin 03/24/2025 14.9  13.3 - 17.7 g/dL Final    Hematocrit 03/24/2025 42.1  40.0 - 53.0 % Final    MCV 03/24/2025 89  78 - 100 fL Final    MCH 03/24/2025 31.6  26.5 - 33.0 pg Final    MCHC 03/24/2025 35.4  31.5 - 36.5 g/dL Final    RDW 03/24/2025 11.2  10.0 - 15.0 % Final    Platelet Count 03/24/2025 218  150 - 450 10e3/uL Final    Sodium 03/24/2025 133 (L)  135 - 145 mmol/L Final    Potassium 03/24/2025 3.9  3.4 - 5.3 mmol/L Final    Chloride 03/24/2025 97 (L)  98 - 107 mmol/L Final    Carbon Dioxide (CO2) 03/24/2025 25  22 - 29 mmol/L Final    Anion Gap 03/24/2025 11  7 - 15 mmol/L Final    Urea Nitrogen 03/24/2025 9.4  6.0 - 20.0 mg/dL Final    Creatinine 03/24/2025 0.73  0.67 - 1.17 mg/dL Final    GFR Estimate 03/24/2025 >90  >60 mL/min/1.73m2 Final    eGFR calculated using 2021 CKD-EPI equation.    Calcium 03/24/2025 8.5 (L)  8.8 - 10.4 mg/dL Final    Glucose 03/24/2025 521 (HH)  70 - 99 mg/dL Final    Color Urine 03/24/2025 Colorless  Colorless, Straw, Light Yellow, Yellow Final    Appearance Urine " 03/24/2025 Clear  Clear Final    Glucose Urine 03/24/2025 >1000 (A)  Negative mg/dL Final    Bilirubin Urine 03/24/2025 Negative  Negative Final    Ketones Urine 03/24/2025 Negative  Negative mg/dL Final    Specific Gravity Urine 03/24/2025 1.032 (H)  1.001 - 1.030 Final    Blood Urine 03/24/2025 Negative  Negative Final    pH Urine 03/24/2025 7.0  5.0 - 7.0 Final    Protein Albumin Urine 03/24/2025 Negative  Negative mg/dL Final    Urobilinogen Urine 03/24/2025 Normal  Normal mg/dL Final    Nitrite Urine 03/24/2025 Negative  Negative Final    Leukocyte Esterase Urine 03/24/2025 Negative  Negative Final    RBC Urine 03/24/2025 <1  <=2 /HPF Final    WBC Urine 03/24/2025 <1  <=5 /HPF Final    pH Venous 03/24/2025 7.36  7.32 - 7.43 Final    pCO2 Venous 03/24/2025 56 (H)  40 - 50 mm Hg Final    pO2 Venous 03/24/2025 29  25 - 47 mm Hg Final    Bicarbonate Venous 03/24/2025 31 (H)  21 - 28 mmol/L Final    Base Excess/Deficit Venous 03/24/2025 3.7 (H)  -3.0 - 3.0 mmol/L Final    FIO2 03/24/2025 21   Final    Oxyhemoglobin Venous 03/24/2025 48 (L)  70 - 75 % Final    O2 Sat, Venous 03/24/2025 48.6 (L)  70.0 - 75.0 % Final    Ketone (Beta-Hydroxybutyrate) Geovanny* 03/24/2025 <0.18  <=0.30 mmol/L Final    GLUCOSE BY METER POCT 03/24/2025 525 (HH)  70 - 99 mg/dL Final    Dr/RN Notified    Influenza A PCR 03/24/2025 Negative  Negative Final    Influenza B PCR 03/24/2025 Negative  Negative Final    RSV PCR 03/24/2025 Negative  Negative Final    SARS CoV2 PCR 03/24/2025 Negative  Negative Final    NEGATIVE: SARS-CoV-2 (COVID-19) RNA not detected, presumed negative.    Hold Specimen 03/24/2025 [facility]   Final    Hold Specimen 03/24/2025 [facility]   Final    GLUCOSE BY METER POCT 03/24/2025 297 (H)  70 - 99 mg/dL Final    GLUCOSE BY METER POCT 03/24/2025 208 (H)  70 - 99 mg/dL Final           Signed Electronically by: Peggy Ortiz MD

## 2025-04-02 ENCOUNTER — OFFICE VISIT (OUTPATIENT)
Dept: FAMILY MEDICINE | Facility: CLINIC | Age: 45
End: 2025-04-02
Payer: MEDICAID

## 2025-04-02 VITALS
TEMPERATURE: 98.1 F | RESPIRATION RATE: 16 BRPM | WEIGHT: 112.4 LBS | HEIGHT: 61 IN | BODY MASS INDEX: 21.22 KG/M2 | HEART RATE: 82 BPM | SYSTOLIC BLOOD PRESSURE: 115 MMHG | DIASTOLIC BLOOD PRESSURE: 74 MMHG | OXYGEN SATURATION: 98 %

## 2025-04-02 DIAGNOSIS — E11.65 HYPERGLYCEMIA DUE TO DIABETES MELLITUS (H): Primary | ICD-10-CM

## 2025-04-02 DIAGNOSIS — Z79.4 TYPE 2 DIABETES MELLITUS WITH HYPERGLYCEMIA, WITH LONG-TERM CURRENT USE OF INSULIN (H): ICD-10-CM

## 2025-04-02 DIAGNOSIS — F32.2 SEVERE MAJOR DEPRESSION WITHOUT PSYCHOTIC FEATURES (H): ICD-10-CM

## 2025-04-02 DIAGNOSIS — E11.65 TYPE 2 DIABETES MELLITUS WITH HYPERGLYCEMIA, WITH LONG-TERM CURRENT USE OF INSULIN (H): ICD-10-CM

## 2025-04-02 DIAGNOSIS — Z59.71 INSURANCE COVERAGE PROBLEMS: ICD-10-CM

## 2025-04-02 PROCEDURE — 3078F DIAST BP <80 MM HG: CPT | Performed by: FAMILY MEDICINE

## 2025-04-02 PROCEDURE — 3074F SYST BP LT 130 MM HG: CPT | Performed by: FAMILY MEDICINE

## 2025-04-02 RX ORDER — INSULIN GLARGINE 100 [IU]/ML
10 INJECTION, SOLUTION SUBCUTANEOUS
COMMUNITY
Start: 2025-03-10 | End: 2025-04-02

## 2025-04-02 RX ORDER — POLYETHYLENE GLYCOL 3350 17 G/17G
17 POWDER, FOR SOLUTION ORAL
COMMUNITY
Start: 2025-03-10 | End: 2025-04-03

## 2025-04-02 RX ORDER — NAPROXEN 500 MG/1
500 TABLET ORAL
COMMUNITY
Start: 2025-03-10 | End: 2025-04-03

## 2025-04-02 RX ORDER — PEN NEEDLE, DIABETIC 31 GX5/16"
NEEDLE, DISPOSABLE MISCELLANEOUS
COMMUNITY
Start: 2024-07-10 | End: 2025-04-03

## 2025-04-02 RX ORDER — LANCETS
EACH MISCELLANEOUS
COMMUNITY
Start: 2024-07-10 | End: 2025-04-03

## 2025-04-02 RX ORDER — GLIPIZIDE 5 MG/1
5 TABLET, FILM COATED, EXTENDED RELEASE ORAL
Qty: 30 TABLET | Refills: 11 | Status: CANCELLED | OUTPATIENT
Start: 2025-04-02

## 2025-04-02 NOTE — Clinical Note
I called and they said they should have it changed by tonight. I told them to send meds to the [facility] on [facility] - that's the pharmacy patient requested when I saw him. Can you call him to  his medicine in the morning? (Probably 4/4/25 by the time you see this)

## 2025-04-03 ENCOUNTER — TELEPHONE (OUTPATIENT)
Dept: EDUCATION SERVICES | Facility: CLINIC | Age: 45
End: 2025-04-03

## 2025-04-03 RX ORDER — LANCETS
EACH MISCELLANEOUS
Qty: 200 EACH | Refills: 3 | Status: SHIPPED | OUTPATIENT
Start: 2025-04-03

## 2025-04-03 RX ORDER — METFORMIN HYDROCHLORIDE 500 MG/1
500 TABLET, EXTENDED RELEASE ORAL
Qty: 90 TABLET | Refills: 3 | Status: SHIPPED | OUTPATIENT
Start: 2025-04-03

## 2025-04-03 NOTE — TELEPHONE ENCOUNTER
Pt did not show for DM appt today. Please contact pt to reschedule.     Thanks  PAULY Bhagat RDN, [facility]  Diabetes Care and Education

## 2025-04-07 ENCOUNTER — PATIENT OUTREACH (OUTPATIENT)
Dept: CARE COORDINATION | Facility: CLINIC | Age: 45
End: 2025-04-07
Payer: MEDICAID

## 2025-04-07 NOTE — PROGRESS NOTES
Clinic Care Coordination Contact  [facility]/Voicemail    Clinical Data: Care Coordinator Outreach    Outreach Documentation Number of Outreach Attempt   4/7/2025   8:31 AM 2       Left message on patient's voicemail with call back information and requested return call.      Plan: Care Coordinator will try to reach patient again in 10 business days.    CCRN tried calling patient again today to notify him that all his prescriptions are ready to be picked up at [facility] but again unable to reach patient. Left a voicemail requesting a call back and to  his prescriptions. Patient is scheduled in-person visit with CCRN on 4/14/25 for initial assessment. Hopefully, he will attend.      Glenys Lantigua RN    Lead Care Coordinator  [address]  [address]  [place] [address]   Office: [phone]  Fax: [phone]

## 2025-04-15 ENCOUNTER — TELEPHONE (OUTPATIENT)
Dept: FAMILY MEDICINE | Facility: CLINIC | Age: 45
End: 2025-04-15
Payer: MEDICAID

## 2025-04-15 NOTE — TELEPHONE ENCOUNTER
Patient Returning Call    Reason for call:  calling to speak with ANGELA Veronica on the Care Coordination team    Information relayed to patient:  she is currently in a meeting but will call him back    Patient has additional questions:  Yes    What are your questions/concerns:  regarding filling out forms    Who does the patient want to speak with:  RN    Okay to leave a detailed message?: Yes at Cell number on file:    Telephone Information:   Mobile [phone]

## 2025-04-15 NOTE — TELEPHONE ENCOUNTER
CCRN spoke with patient. Patient would like support to fill out form to apply for rental assistance. FRW referral placed today to assist patient apply for rental assistance. Explained to patient CCRN will not be able to assist him fill out the form but FRW will call him to assist him.

## 2025-04-18 ENCOUNTER — APPOINTMENT (OUTPATIENT)
Dept: INTERPRETER SERVICES | Facility: CLINIC | Age: 45
End: 2025-04-18
Payer: MEDICAID

## 2025-04-18 NOTE — TELEPHONE ENCOUNTER
"Left message on patients voicemail. X 3    Patient is scheduled with Care Guide team on 4/21.      Placed a note on Message section: \"ATTN FD-pls r/s missed appnt with Elda-please update telephone encounter. BS\"     Jaymie Emmanuel  4/18/2025  8:30 am  "

## 2025-04-21 ENCOUNTER — ALLIED HEALTH/NURSE VISIT (OUTPATIENT)
Dept: NURSING | Facility: CLINIC | Age: 45
End: 2025-04-21
Payer: MEDICAID

## 2025-04-21 DIAGNOSIS — Z71.89 COMPLEX CARE COORDINATION: Primary | ICD-10-CM

## 2025-04-21 PROCEDURE — 99207 PR NO CHARGE LOS: CPT

## 2025-04-21 NOTE — PROGRESS NOTES
Clinic Care Coordination Medication Education Initial Visit    Patient presents for:  Medication education, Pill box teaching, Compliance monitoring, and Medication reconciliation    Language: Radha  : Yes    Communication: Literate in other languages, Illiterate, and lack of health literacy    Accompanied by:      Patient Living Situation:      Primary Care Provider:  Peggy Ortiz    Barriers:  Financial, Language, Cultural, Poor insight into disease process, Inadequate support at home, Non-compliance of medications, Multiple uncontrolled disease states, Chronic persistent mental illness, and Memory deficits    Medication List (see cited below): Patient presents with all ordered medications    Current Outpatient Medications   Medication Sig Dispense Refill    blood glucose (NO BRAND SPECIFIED) test strip Use to test blood sugar 3 times daily or as directed. To accompany: Blood Glucose Monitor Brands: per insurance. 200 strip 3    blood glucose monitoring (NO BRAND SPECIFIED) meter device kit Use to test blood sugar 3 times daily or as directed. Preferred blood glucose meter OR supplies to accompany: Blood Glucose Monitor Brands: per insurance. 1 kit 0    insulin glargine (LANTUS PEN) 100 UNIT/ML pen Inject 16 Units subcutaneously at bedtime. 15 mL 0    metFORMIN (GLUCOPHAGE XR) 500 MG 24 hr tablet Take 1 tablet (500 mg) by mouth daily (with dinner). 90 tablet 3    sertraline (ZOLOFT) 50 MG tablet Take 1 tablet (50 mg) by mouth daily. 90 tablet 3    thin (NO BRAND SPECIFIED) lancets Use with lanceting device. To accompany: Blood Glucose Monitor Brands: per insurance. 200 each 3     No current facility-administered medications for this visit.       Compliance: 100%    Future Appointments   Date Time Provider Department Center   4/21/2025  2:00 PM [facility] RN DACSUP [facility]   4/30/2025 10:00 AM Elda Vargas RD DADIAB [facility]   5/1/2025  2:00 PM Peggy Ortiz MD DAFMOB [facility]   5/22/2025  3:00 PM  Charlene Ovalle, PharmD [facility]   7/7/2025 10:00 AM Peggy Ortiz MD DAFMOB [facility]       Action Plan  RN Will:  4/25/25 at 2pm      Nursing Notes:  CCRN met with patient today for med/insulin teaching.    Sertraline 50mg - reports taking 1 tab daily every morning for his depression.    Metformin 500mg - reports taking 1 tab BID with meals but per chart review, patient should only be taking 1 tab at dinner. Instructed patient to take 1 tab at dinner as prescribed. Patient verbalized understanding.     Lantus - per patient, he wasn't able to  insulin needles so he wasn't able to administer insulin. CCRN called [facility] today and was told PCP didn't sent script for insulin needles but they have old script so they will fill it today. Instructed patient to  insulin needle today. Demonstrated to patient how to administer insulin and patient was hilda to demonstrated back correctly. Patient states he used it in the past and he knows how to administer it. Instructed patient to keep un-open insulin pen in the fridge ( not freezer). Patient verbalized understanding.     Glucometer - CCRN assisted patient set up glucometer today and demonstrated to patient how to check his BG 3 times per day. Patient was able to demonstrated back how to check his BG. Today BG was 427. Patient states he just ate.     CCRN instructed patient to come back to see CCRN this Friday at 2pm to review med compliance, BG results and insulin coompliance. Will update PCP as needed.      Case  [phone]    :   Irvin Beltrán   [phone]    Dilan reached out to [facility] today. Per [facility], Per [facility] that his case being review and revive and will send to a .

## 2025-04-23 ENCOUNTER — PATIENT OUTREACH (OUTPATIENT)
Dept: CARE COORDINATION | Facility: CLINIC | Age: 45
End: 2025-04-23
Payer: MEDICAID

## 2025-04-23 NOTE — LETTER
[facility] CARE COORDINATION        April 23, 2025      Pablo Russo  [address]   [place] [address]        Dear Hsa,                                                                      The Financial Resource team has attempted to reach to you to assist you with any financial barriers you may be facing in your healthcare journey.  We would like to provide any additional support you may need related to financial support for your healthcare.  Our team are Certified MNSure Navigators, and we offer support with application assistance for Medical Assistance, MNSure Applications, Energy Assistance, Emergency Assistance, Food Assistance and many other initial applications for [facility] benefits.     I would appreciate if you would call me at [phone] to let me know if you would like assistance.      Sincerely,                                                                         Georgia Gonsalez MA

## 2025-04-23 NOTE — PROGRESS NOTES
FRW Update   4/23/25 Outreach attempted x 2. Unable to leave a message on voicemail indicating last outreach attempt.   Plan: FRW closed the FRW program, sent letter. Patient can be referred back to [facility] if needed.

## 2025-04-29 ENCOUNTER — PATIENT OUTREACH (OUTPATIENT)
Dept: CARE COORDINATION | Facility: CLINIC | Age: 45
End: 2025-04-29
Payer: MEDICAID

## 2025-04-29 NOTE — PROGRESS NOTES
Clinic Care Coordination Contact  [facility]/Voicemail    Clinical Data: Care Coordinator Outreach    Outreach Documentation Number of Outreach Attempt   4/7/2025   8:31 AM 2   4/18/2025   8:04 AM 1   4/25/2025   2:24 PM 1   4/29/2025   9:27 AM 1     CHW attempted to call patient 3x, unable to reach nor able to leave a voice message to phone kept ringing 2 t 3 times and went to busy tone.      Plan: Care Coordinator will try to reach patient again in two weeks.

## 2025-04-30 ENCOUNTER — TELEPHONE (OUTPATIENT)
Dept: EDUCATION SERVICES | Facility: CLINIC | Age: 45
End: 2025-04-30

## 2025-04-30 NOTE — TELEPHONE ENCOUNTER
DAWN: Patient no showed me on 4/25/25 and I couldn't reach him. Dilan couldn't reach patient again yesterday. He's scheduled to see you next week.

## 2025-04-30 NOTE — TELEPHONE ENCOUNTER
Pt did not show for diabetes education appointment today. Please contact pt to reschedule.     Thanks  PAULY Bhagat RDN, [facility]  Diabetes Care and Education

## 2025-05-01 NOTE — TELEPHONE ENCOUNTER
Called X1 no answer left message to call back to [facility] for no show appt on 04/30 with Elda diabetes education.

## 2025-05-13 ENCOUNTER — PATIENT OUTREACH (OUTPATIENT)
Dept: CARE COORDINATION | Facility: CLINIC | Age: 45
End: 2025-05-13
Payer: MEDICAID

## 2025-05-13 NOTE — PROGRESS NOTES
Clinic Care Coordination Contact  Community Health Worker Follow Up    Care Gaps:   Health Maintenance Due   Topic Date Due    DIABETIC FOOT EXAM  04/27/2023    YEARLY PREVENTIVE VISIT  08/31/2023    LIPID  11/17/2023    MICROALBUMIN  11/17/2023    EYE EXAM  11/17/2023    COVID-19 Vaccine (5 - 2024-25 season) 09/01/2024     Scheduled 7/16/2025 for preventive care visit.      Care Plan:   Care Plan: DM II mgmt/teaching       Problem: Uncontroleed DM II       Goal: Patient will atttend appointment with MTM, PCP, and CCRN in the next 12 months to better control his DM II.       Start Date: 4/14/2025 Expected End Date: 4/30/2025    This Visit's Progress: 20% Recent Progress: 10%    Note:     Barriers: language barrier, low literacy, noncompliance, and lack of knowledge how to navigate complex health care system  Strengths: motivated to attend appt  Patient expressed understanding of goal: Yes    Action steps to achieve this goal:  1. I will attend in-person appointment with CCRN on 4/21/25 at 2:00 pm for MEV. Attended.  2. I will attend my follow-up PCP appointment on 5/1/25 at 1:40 pm. No showed.  3. I will attend MTM appointment on 5/22/25 at 3:00 pm. Reminded.   4. I will follow up with CCC regarding this goal at each outreach until it is completed. Next in-person appointment with CCC RN is on 6/02/2025 at 1:00 PM.     Hemoglobin A1C   Latest Ref Rng <5.7 %   11/17/2022  3:02 PM 12.4 (H)    3/8/2023  10:15 AM 12.3 (H)    3/14/2025  9:49 PM 11.1 (H)                              Intervention and Education during outreach:  - RD appt on 4/30 reminder. Ask patient to bring all his meds, insulin, and glucometer to appt on 4/30 with RD. No showed and patient agreed to rescheduled and reminded patient to bring all medications and devices at every appointment.  -Reschedule in person MEV with CCRN at 1st available if he agrees. No showed with CCRN on 2/25/25. Rescheduled.  -Patient is attending training or treatment for his  addiction or probation Monday to Thursday from 9:00 AM to 11:00 AM and he's only available afternoon appointments.   -Patient was informed to call with questions or concerns.    CHW Next Outreach: In one month.

## 2025-05-22 ENCOUNTER — TELEPHONE (OUTPATIENT)
Dept: FAMILY MEDICINE | Facility: CLINIC | Age: 45
End: 2025-05-22
Payer: COMMERCIAL

## 2025-05-22 NOTE — TELEPHONE ENCOUNTER
Demetria from [facility] calling to request urgent appointment for physical needed to get patient into housing ASAP.     Current appointment is scheduled for 5/30--Demetria now has the opportunity to get patient into housing with nursing care to assist patient with managing diabetes medications however needs physical moved up to soonest available.     Scheduled in available slot for tomorrow, 5/23.     Future Appointments   Date Time Provider Department Center   5/23/2025 12:30 PM [facility]   5/30/2025  1:30 PM Kwan Zavala, APRN [facility]   6/2/2025  1:00 PM [facility] RN DACSUP [facility]   6/18/2025  1:30 PM Elda Vargas RD DADIAB [facility]   6/25/2025  2:00 PM [facility]     Kaela Johnson RN BSN  [facility]

## 2025-05-23 ENCOUNTER — MEDICAL CORRESPONDENCE (OUTPATIENT)
Dept: HEALTH INFORMATION MANAGEMENT | Facility: CLINIC | Age: 45
End: 2025-05-23

## 2025-05-27 DIAGNOSIS — Z79.4 TYPE 2 DIABETES MELLITUS WITH DIABETIC MICROALBUMINURIA, WITH LONG-TERM CURRENT USE OF INSULIN (H): Primary | ICD-10-CM

## 2025-05-27 DIAGNOSIS — R80.9 TYPE 2 DIABETES MELLITUS WITH DIABETIC MICROALBUMINURIA, WITH LONG-TERM CURRENT USE OF INSULIN (H): Primary | ICD-10-CM

## 2025-05-27 DIAGNOSIS — E11.29 TYPE 2 DIABETES MELLITUS WITH DIABETIC MICROALBUMINURIA, WITH LONG-TERM CURRENT USE OF INSULIN (H): Primary | ICD-10-CM

## 2025-05-27 RX ORDER — LISINOPRIL 2.5 MG/1
2.5 TABLET ORAL DAILY
Qty: 90 TABLET | Refills: 3 | Status: SHIPPED | OUTPATIENT
Start: 2025-05-27 | End: 2025-05-28

## 2025-05-28 ENCOUNTER — PATIENT OUTREACH (OUTPATIENT)
Dept: CARE COORDINATION | Facility: CLINIC | Age: 45
End: 2025-05-28
Payer: COMMERCIAL

## 2025-05-28 DIAGNOSIS — Z79.4 TYPE 2 DIABETES MELLITUS WITH DIABETIC MICROALBUMINURIA, WITH LONG-TERM CURRENT USE OF INSULIN (H): ICD-10-CM

## 2025-05-28 DIAGNOSIS — G47.00 INSOMNIA, UNSPECIFIED TYPE: ICD-10-CM

## 2025-05-28 DIAGNOSIS — E11.65 TYPE 2 DIABETES MELLITUS WITH HYPERGLYCEMIA, WITH LONG-TERM CURRENT USE OF INSULIN (H): ICD-10-CM

## 2025-05-28 DIAGNOSIS — E11.29 TYPE 2 DIABETES MELLITUS WITH DIABETIC MICROALBUMINURIA, WITH LONG-TERM CURRENT USE OF INSULIN (H): ICD-10-CM

## 2025-05-28 DIAGNOSIS — R80.9 TYPE 2 DIABETES MELLITUS WITH DIABETIC MICROALBUMINURIA, WITH LONG-TERM CURRENT USE OF INSULIN (H): ICD-10-CM

## 2025-05-28 DIAGNOSIS — F32.2 SEVERE MAJOR DEPRESSION WITHOUT PSYCHOTIC FEATURES (H): ICD-10-CM

## 2025-05-28 DIAGNOSIS — Z79.4 TYPE 2 DIABETES MELLITUS WITH HYPERGLYCEMIA, WITH LONG-TERM CURRENT USE OF INSULIN (H): ICD-10-CM

## 2025-05-28 DIAGNOSIS — R76.12 POSITIVE QUANTIFERON-TB GOLD TEST: Primary | ICD-10-CM

## 2025-05-28 RX ORDER — TRAZODONE HYDROCHLORIDE 50 MG/1
50 TABLET ORAL AT BEDTIME
Qty: 30 TABLET | Refills: 0 | Status: SHIPPED | OUTPATIENT
Start: 2025-05-28

## 2025-05-28 RX ORDER — LISINOPRIL 2.5 MG/1
2.5 TABLET ORAL DAILY
Qty: 30 TABLET | Refills: 0 | Status: SHIPPED | OUTPATIENT
Start: 2025-05-28

## 2025-05-28 RX ORDER — METFORMIN HYDROCHLORIDE 500 MG/1
1000 TABLET, EXTENDED RELEASE ORAL 2 TIMES DAILY WITH MEALS
Qty: 360 TABLET | OUTPATIENT
Start: 2025-05-28

## 2025-05-28 RX ORDER — LISINOPRIL 2.5 MG/1
2.5 TABLET ORAL DAILY
Qty: 90 TABLET | OUTPATIENT
Start: 2025-05-28

## 2025-05-28 RX ORDER — METFORMIN HYDROCHLORIDE 500 MG/1
1000 TABLET, EXTENDED RELEASE ORAL 2 TIMES DAILY WITH MEALS
Qty: 120 TABLET | Refills: 0 | Status: SHIPPED | OUTPATIENT
Start: 2025-05-28

## 2025-05-28 NOTE — PROGRESS NOTES
Received a call from patient stating that he was able to  his prescriptions with support from a community member. Patient would like to clarify if he can administer Lantus now which he usually administer around 8am. Consulted with Dr Vazquez today and it's okay to administer it now around 1pm, 10am tomorrow and back to 8am after that. Patient verbalized understanding.     Educated patient how to take each meds as prescribed. Patient is able to spell out prescription names and direction. He seems to read the directions how to take his meds correctly. CCRN plans to review with him again on 5/30/25.

## 2025-05-28 NOTE — PROGRESS NOTES
Patient is restricted to [facility] and to me as a PCP.   All medications need to be filled by me, at his chosen pharmacy.     Patient has chosen to not show up for any of his appointments and to cancel all future appointments with me because he wants a new PCP who will help him with his citizenship. No one can help him with his citizenship until he resolves his legal issues.     30 day supply sent. No refills until seen.   Reiterating again - he is RESTRICTED to Dr. Ortiz as PCP.     Peggy Ortiz MD

## 2025-05-28 NOTE — PROGRESS NOTES
Clinic Care Coordination Contact  Follow Up Progress Note      Assessment: Follow up on med compliance    CCRN tried calling patient yesterday x3 and left a VM. CCRN was able to reach patient today after 2nd attempt. Patient states he was probably sleeping when calls came in yesterday or earlier today. He doesn't know how to access his vm.     Patient states he has no ride to  his prescriptions or has no friends that willing to take him to pharmacy to  his prescriptions. Patient connected patient to a [facility] community member to take him to the pharmacy to  his prescriptions today.     Patient was crying during call today stating that he felt lost with no support. Patient has long history of non-compliance, not engaging with CC team or answer his call or return call. I do question his cognitive ability. Patient was nonadherent to his treatment plans or recommendations. PCP referred him for neuropsychological evaluation on but it was determined that he wasn't fully participating in the interview in 2022 by EBONY     [facility]   Per patient, he hasn't been attending therpay at [facility] due to lack of transportation this week. CCRN called patient's recovery  but had to leave a voicemail requesting a return call. CCRN would like to find out what services patient is receiving at [facility] and if they can assist patient request medical transportation for his treatment at [facility].     [facility] main # [phone]    Care Plans  Care Plan: DM II mgmt/teaching       Problem: Uncontroleed DM II       Goal: Patient will atttend appointment with MTM, PCP, and CCRN in the next 12 months to better control his DM II.       Start Date: 4/14/2025 Expected End Date: 4/30/2026    Recent Progress: 20%    Note:     Barriers: language barrier, low literacy, noncompliance, and lack of knowledge how to navigate complex health care system  Strengths: motivated to attend appt  Patient expressed  understanding of goal: Yes    Action steps to achieve this goal:  1. I will attend in-person appointment with CCRN on 4/21/25 at 2:00 pm for MEV. Attended.  2. I will attend my follow-up PCP appointment on 5/1/25 at 1:40 pm. No showed.  3. I will attend MTM appointment on 5/22/25 at 3:00 pm. No showed.   4. I will attend my preventative care with RJ on 5/30/25 and MEV with CCRN same day.    4. I will follow up with CCC regarding this goal at each outreach until it is completed. Next in-person appointment with CCC RN is on 6/02/2025 at 1:00 PM.     Hemoglobin A1C   Latest Ref Rng <5.7 %   11/17/2022  3:02 PM 12.4 (H)    3/8/2023  10:15 AM 12.3 (H)    3/14/2025  9:49 PM 11.1 (H)                                  Plan:   1) Patient will attend his preventative care with LOREN on 5/30/25 and CCRN plans to follow up with patient post appt with NP.   2) Plan to refer patient to home care if he agrees. Will need make sure patient will answer his phone or stay home for home care nurse visits.   3) Left a voicemail for Jason  today.   4) Replaced by [facility] service if patient agrees on 5/30/25, therapy and psychiatry.

## 2025-06-02 ENCOUNTER — PATIENT OUTREACH (OUTPATIENT)
Dept: CARE COORDINATION | Facility: CLINIC | Age: 45
End: 2025-06-02

## 2025-06-02 NOTE — PROGRESS NOTES
CCRN received a call from Yessica at [facility] updating CCRN that patient was brought to [facility] for back pain by paramedics. Patient will need outpatient MRI due to restricted to Dr Ortiz and [facility].     Patient no showed CCRN today but CCRN was able to connect with patient during a call with Yessica. Patient states a  went to refill his phone card for him today and his phone just back on service.     Patient states he's in a lot of back pain and leg cramps. Agreed to MRI. Will consult with Dr Ortiz tomorrow re: pain mgmt and order for MRI. PCP left for the day. Patient is okay to wait until tomorrow.

## 2025-06-02 NOTE — PROGRESS NOTES
Clinic Care Coordination Contact  [facility]/Voicemail    Clinical Data: Care Coordinator Outreach    Outreach Documentation Number of Outreach Attempt   4/25/2025   2:24 PM 1   4/29/2025   9:27 AM 1   5/30/2025   2:48 PM 1   6/2/2025   1:43 PM 2       Unable to leave a message due to: Voicemail is not set up.      Plan:Care Coordinator will try to reach patient again in 10 business days.    Patient again no showed with CCRN in person today and last Friday. Tried calling patient's phone but seems like phone is not working and vm not set up.

## 2025-06-03 ENCOUNTER — PATIENT OUTREACH (OUTPATIENT)
Dept: CARE COORDINATION | Facility: CLINIC | Age: 45
End: 2025-06-03

## 2025-06-03 NOTE — PROGRESS NOTES
Clinic Care Coordination Contact  Follow Up Progress Note      Assessment: CCRN spoke with patient today via phone after consulted with Dr Ortiz. Per Angel, patient needs to see her before she can prescribes anything for pain. Explained to patient the reasons why he needs to see Dr Ortiz first. Patient agreed to the plan. ED follow up rescheduled from 6/10 to 6/9 with Dr Ortiz. Patient is restricted to Dr Ortiz. CCRN plans to see patient on 6/9/25 as planned. Per Dr Ortiz, patient will need to switch PCP if he no shows again on 6/9/25.     Plan: Patient will attend appt with PCP and CCRN on 6/9/25.

## 2025-06-03 NOTE — TELEPHONE ENCOUNTER
Closing encounter per message below.      Jamil Reece, BSN, RN, PHN   [facility]    
Pt called back w/  stating he already got a refill w/ his other    
09-Mar-2024

## 2025-06-09 ENCOUNTER — OFFICE VISIT (OUTPATIENT)
Dept: FAMILY MEDICINE | Facility: CLINIC | Age: 45
End: 2025-06-09
Payer: COMMERCIAL

## 2025-06-09 ENCOUNTER — ANCILLARY PROCEDURE (OUTPATIENT)
Dept: GENERAL RADIOLOGY | Facility: CLINIC | Age: 45
End: 2025-06-09
Attending: FAMILY MEDICINE
Payer: COMMERCIAL

## 2025-06-09 ENCOUNTER — ALLIED HEALTH/NURSE VISIT (OUTPATIENT)
Dept: NURSING | Facility: CLINIC | Age: 45
End: 2025-06-09
Payer: COMMERCIAL

## 2025-06-09 VITALS
RESPIRATION RATE: 16 BRPM | BODY MASS INDEX: 22.84 KG/M2 | HEIGHT: 61 IN | WEIGHT: 121 LBS | TEMPERATURE: 98.1 F | HEART RATE: 82 BPM | OXYGEN SATURATION: 97 % | DIASTOLIC BLOOD PRESSURE: 86 MMHG | SYSTOLIC BLOOD PRESSURE: 123 MMHG

## 2025-06-09 DIAGNOSIS — R76.12 POSITIVE QUANTIFERON-TB GOLD TEST: ICD-10-CM

## 2025-06-09 DIAGNOSIS — F43.21 ADJUSTMENT DISORDER WITH DEPRESSED MOOD: ICD-10-CM

## 2025-06-09 DIAGNOSIS — G62.9 NEUROPATHY: ICD-10-CM

## 2025-06-09 DIAGNOSIS — E11.29 TYPE 2 DIABETES MELLITUS WITH DIABETIC MICROALBUMINURIA, WITH LONG-TERM CURRENT USE OF INSULIN (H): Primary | ICD-10-CM

## 2025-06-09 DIAGNOSIS — R80.9 TYPE 2 DIABETES MELLITUS WITH DIABETIC MICROALBUMINURIA, WITH LONG-TERM CURRENT USE OF INSULIN (H): Primary | ICD-10-CM

## 2025-06-09 DIAGNOSIS — K60.30 ANAL FISTULA: ICD-10-CM

## 2025-06-09 DIAGNOSIS — F32.2 SEVERE MAJOR DEPRESSION WITHOUT PSYCHOTIC FEATURES (H): ICD-10-CM

## 2025-06-09 DIAGNOSIS — M54.50 BACK PAIN, LUMBOSACRAL: ICD-10-CM

## 2025-06-09 DIAGNOSIS — G47.01 INSOMNIA DUE TO MEDICAL CONDITION: ICD-10-CM

## 2025-06-09 DIAGNOSIS — Z79.4 TYPE 2 DIABETES MELLITUS WITH DIABETIC MICROALBUMINURIA, WITH LONG-TERM CURRENT USE OF INSULIN (H): Primary | ICD-10-CM

## 2025-06-09 DIAGNOSIS — Z71.89 COMPLEX CARE COORDINATION: Primary | ICD-10-CM

## 2025-06-09 PROBLEM — G43.009 MIGRAINE WITHOUT AURA AND WITHOUT STATUS MIGRAINOSUS, NOT INTRACTABLE: Status: ACTIVE | Noted: 2021-05-06

## 2025-06-09 PROBLEM — E11.65 TYPE 2 DIABETES MELLITUS WITH HYPERGLYCEMIA, WITHOUT LONG-TERM CURRENT USE OF INSULIN (H): Status: RESOLVED | Noted: 2020-02-11 | Resolved: 2025-06-09

## 2025-06-09 PROBLEM — E11.65 HYPERGLYCEMIA DUE TO DIABETES MELLITUS (H): Status: RESOLVED | Noted: 2025-03-14 | Resolved: 2025-06-09

## 2025-06-09 PROCEDURE — G2211 COMPLEX E/M VISIT ADD ON: HCPCS | Performed by: FAMILY MEDICINE

## 2025-06-09 PROCEDURE — 71046 X-RAY EXAM CHEST 2 VIEWS: CPT | Mod: TC | Performed by: RADIOLOGY

## 2025-06-09 PROCEDURE — 99207 PR NO CHARGE LOS: CPT

## 2025-06-09 PROCEDURE — 99215 OFFICE O/P EST HI 40 MIN: CPT | Performed by: FAMILY MEDICINE

## 2025-06-09 RX ORDER — LISINOPRIL 2.5 MG/1
2.5 TABLET ORAL DAILY
Qty: 90 TABLET | Refills: 3 | Status: SHIPPED | OUTPATIENT
Start: 2025-06-09

## 2025-06-09 RX ORDER — CELECOXIB 200 MG/1
200 CAPSULE ORAL DAILY
Qty: 60 CAPSULE | Refills: 1 | Status: SHIPPED | OUTPATIENT
Start: 2025-06-09

## 2025-06-09 RX ORDER — METFORMIN HYDROCHLORIDE 500 MG/1
1000 TABLET, EXTENDED RELEASE ORAL 2 TIMES DAILY WITH MEALS
Qty: 120 TABLET | Refills: 2 | Status: SHIPPED | OUTPATIENT
Start: 2025-06-09

## 2025-06-09 RX ORDER — GABAPENTIN 100 MG/1
100 CAPSULE ORAL 2 TIMES DAILY
Qty: 60 CAPSULE | Refills: 1 | Status: SHIPPED | OUTPATIENT
Start: 2025-06-09

## 2025-06-09 RX ORDER — QUETIAPINE FUMARATE 50 MG/1
50 TABLET, FILM COATED ORAL
Qty: 30 TABLET | Refills: 2 | Status: SHIPPED | OUTPATIENT
Start: 2025-06-09

## 2025-06-09 RX ORDER — CELECOXIB 200 MG/1
200 CAPSULE ORAL DAILY
Qty: 90 CAPSULE | OUTPATIENT
Start: 2025-06-09

## 2025-06-09 NOTE — PROGRESS NOTES
CHANI Lopez/[facility] is not able to book the ride to [facility] as patient is restricted to there and the referral has to come from PCP, per Zaire Torre.

## 2025-06-09 NOTE — PROGRESS NOTES
Dr Ortiz placed MR order today. Please assist patient schedule MRI at [facility] and cancel at . Please update date patient. Thanks.

## 2025-06-09 NOTE — PROGRESS NOTES
Assessment & Plan     Type 2 diabetes mellitus with diabetic microalbuminuria, with long-term current use of insulin (H)  Future ordered A1C, he was taking lantus 20 unit(s) not 16 - refilled to this dose. Continue metformin.   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   - QUEtiapine (SEROQUEL) 50 MG tablet  Dispense: 30 tablet; Refill: 2    Back pain, lumbosacral  Neuropathy  Restart celebrex. Chronic back pain and likely in combination with diabetic neuropathy with poorly controlled glucose.   - gabapentin (NEURONTIN) 100 MG capsule  Dispense: 60 capsule; Refill: 1  - celecoxib (CELEBREX) 200 MG capsule  Dispense: 60 capsule; Refill: 1    Positive QuantiFERON-TB Gold test  Positive quant gold and unclear if was ever treated in the past. Will get CXR to rule out active TB, and refer to [facility] for latent tb treatment.   - XR Chest 2 Views    Anal fistula  Seen by colorectal recently and recommended MRI to determine if surgery is needed again. [facility] but he is a restricted patient and is unable to get medical rides to their facilities.   - MR Pelvis (Intrapelvic Organs) wo&w Contrast    Adjustment disorder.   Was evaluated previously, but still some significant concerns for neurocognitive deficit, depression, ptsd, anxiety, possible substance or alcohol use disorder. He doesn't seem to completely process information or directions given to him. Recommend reassessment.       MED REC REQUIRED  Post Medication Reconciliation Status: patient was not discharged from an inpatient facility or TCU    40 minutes spent on the date of the encounter  "doing chart review, history and exam, documentation and further activities per the note      Follow-up  Return in about 2 months (around 8/9/2025) for diabetes follow up.    Subjective   Hsa is a 44 year old, presenting for the following health issues:  Hospital F/U (ER visit on 6/2 for back pain and numbing in both feet. Patient also states that the hospital told him his blood results came back negative for TB, he states that when he took the test here at [place] the results were positive and he needs it to be negative for housing. ), Medication Request (Patient would like a prescription for back pain medication and nerve pain. He is requesting Celecoxib 200 MG. He would also like a vitamin for appetite/strength (maybe Vit A or B) ), and Diabetes (Patient reports that his blood sugars have been between 100-200 and he has been experiencing blurred vision. )        6/9/2025     7:06 AM   Additional Questions   Roomed by Divya KEITA   Accompanied by Self     HPI      Patient has had multiple ER visits and has seen other providers but he is a restricted patient. T    Tingling in his legs from hip down to ankles            Objective    /86   Pulse 82   Temp 98.1  F (36.7  C) (Oral)   Resp 16   Ht 1.549 m (5' 1\")   Wt 54.9 kg (121 lb)   SpO2 97%   BMI 22.86 kg/m    Body mass index is 22.86 kg/m .  Physical Exam   GENERAL: alert and no distress  NECK: no adenopathy, no asymmetry, masses, or scars  RESP: lungs clear to auscultation - no rales, rhonchi or wheezes  CV: regular rate and rhythm, normal S1 S2, no S3 or S4, no murmur, click or rub, no peripheral edema  ABDOMEN: soft, nontender, no hepatosplenomegaly, no masses and bowel sounds normal  MS: no gross musculoskeletal defects noted, no edema    No results found for any visits on 06/09/25.  No results found for this or any previous visit (from the past 24 hours).        Signed Electronically by: Peggy Ortiz MD    "

## 2025-06-09 NOTE — PROGRESS NOTES
Clinic Care Coordination Contact  Follow Up Progress Note      Assessment: follow up on multiples concerns as details below:     Address shows at [facility]: [address] [address]    TB clinic - Quantiferon-TB Gold Plus positive  CCRN spoke with TB clinic on 6/4/25 was told they are behind and may take 2-3 weeks for someone to reach out to patient. CCRN plans to follow up with TB clinic in 3 weeks. New goal created.     MRI   Per [facility], patient needs outpatient MR which is scheduled on 6/13/25 at 11:15 am. NPO 8 hours prior to scheduled time. CHW to set up ride.     Med compliance   Patient brought prescription bottles with him today as instructed.     Metformin - 2 bottles. One filled back in April, 25 has 7 tabs, one filled on 5/28. Has 100 tabs.     Lisinopril filled on 5/28 - has 18 tabs    Sertraline 50mg - has 2 bottles - one filled on 4/3 for 90 tabs - has 67 tabs and 1 filled on 5/28 for 30 days - has 26 tabs.     Educated patient the important of taking his meds as prescribed. Reviewed 3 new meds that was prescribed by PCP today. Patient plans to pick it [facility].     Home care skilled nursing  Patient declined home care skilled nursing at this time due to unstable housing.     Diabetes mgmt/teaching  Patient continues to be non-compliance with taking his meds an insulin as prescribed. BG in clinic was 317. Patient states he forgets to take his meds or insulin 3-4 times per week. Educated patient on the important of taking his meds and administer his insulin as prescribed. Educated patient on neuropathy pain to his lower legs due to uncontrolled DM. Demonstrated patient how to set up alarm on his phone to remind him to take his meds and insulin.     arlene Whitman worker [phone] - Left a voicemail message [facility] today requesting a return call to assist patient with community resources, eye appt, and transportation if needed.    Eye exam   Patient  states he was never seen by an eye doctor for years. Patient agreed to schedule appointment.     [facility]  [facility], [place] [address]   [phone]    Dental exam  Declined to schedule dental exam.     [facility]   Patient is scheduled to attend treatment Monday through Friday from 9am - 11 am. Patient states he missed a few due to pain and not feeling well but he does notify them when he misses his treatment via phone. Patient states he's concerned about financial difficulty such as he owns [employer], unable pay traffic tickets, and he owes Lita  $200. He was told he needs to pay $200 each visit with . Patient is wondering if there's financial assistance for this. Patient is unable to work and no income. Patient agreed to meet with CCSW on 6/12/25 at 11:00am. Educated patient on the important of attending appointment with CCSW to address his financial concern.     Jeff Leiva [facility]   [phone] ( office)  [phone] - direct  Fax: [phone]  [email or web address]    Tracee Justin - Treatment Coordinator   [phone]    Lita Peña -    [phone]  Fax: [phone]  [email or web address].    Care Plans  Care Plan: DM II mgmt/teaching       Problem: Uncontroleed DM II       Goal: Patient will atttend appointment with MTM, PCP, and CCRN in the next 12 months to better control his DM II.       Start Date: 4/14/2025 Expected End Date: 4/30/2026    Recent Progress: 20%    Note:     Barriers: language barrier, low literacy, noncompliance, and lack of knowledge how to navigate complex health care system  Strengths: motivated to attend appt  Patient expressed understanding of goal: Yes    Action steps to achieve this goal:  1. I will attend in-person appointment with CCRN on 4/21/25 at 2:00 pm for MEV. Attended.  2. I will attend my follow-up PCP appointment on 5/1/25 at 1:40 pm. No showed.  3. I will attend MTM  appointment on 5/22/25 at 3:00 pm. No showed.   4. I will attend my preventative care with RJ on 5/30/25 and MEV with CCRN same day.  No showed.   5. I will follow up with [facility] regarding this goal at each outreach until it is completed. Next in-person appointment with CCC RN is on 6/02/2025 at 1:00 PM. No showed.   6. I will attend my follow up PCP and CCRN appointments on 6/9/25. Completed.   7. I will attend my RD appointment on 6/18/25 at 1:25 pm.   8. I will attend my follow-up MTM appointment on 6/25/25 at 2:00 pm.   9. I will attend my follow up appointment with CCRN on        Hemoglobin A1C   Latest Ref Rng <5.7 %   11/17/2022  3:02 PM 12.4 (H)    3/8/2023  10:15 AM 12.3 (H)    3/14/2025  9:49 PM 11.1 (H)                                Care Plan: Imaging       Problem: pain       Goal: Patient will attend his MRI appointment in the next 30 days.       Start Date: 6/9/2025 Expected End Date: 7/9/2025    This Visit's Progress: 10%    Note:     Barriers: language barrier, low literacy, noncompliance, and lack of knowledge how to navigate complex health care system  Strengths: motivated to attend appt  Patient expressed understanding of goal: Yes    Action steps to achieve this goal:  1. I will answer my phone when I am contacted to schedule my appointment.  2. I will attend my MRI appointment as scheduled 6/13/25 at 11:15 am. NPO 8 hours.   3. I will schedule a follow up appointment with my PCP if it is recommended to do so while I am at the clinic.  4. I will follow up with [facility] regarding this goal at each outreach until it is completed.                               Care Plan: TB clinic       Problem: TB Gold Plus positive       Goal: Patient will be connected to [facility] TB clinic in the next 90 days.       Start Date: 6/9/2025 Expected End Date: 9/30/2025    This Visit's Progress: 10%    Note:     Barriers: language barrier, low literacy, noncompliance, and lack of knowledge how to navigate complex health  care system  Strengths: motivated to attend appt  Patient expressed understanding of goal: Yes    Action steps to achieve this goal:  1. I will answer my phone when I am contacted to schedule my appointment.  2. I will attend my TB clinic appointment as scheduled  TBD  3. I will schedule a follow up appointment with my PCP if it is recommended to do so while I am at the clinic.  4. I will follow up with CCC regarding this goal at each outreach until it is completed.     Note:   6/4/25: CCRN spoke with TB clinic on wait list 2-3 weeks.                                Plan: CHW to request medical transportation for MRI and SW appts.

## 2025-06-09 NOTE — PROGRESS NOTES
Called and spoke with patient's [facility] worker and [facility] worker will assist setting up for him with [employer] for that appointment. Per [facility] worker, patient is receiving home visit 2x per week and repeatedly reminding him to make sure to request helps from [facility] worker for any needs.

## 2025-06-10 ENCOUNTER — PATIENT OUTREACH (OUTPATIENT)
Dept: CARE COORDINATION | Facility: CLINIC | Age: 45
End: 2025-06-10

## 2025-06-10 NOTE — PROGRESS NOTES
Clinic Care Coordination Contact  Follow Up Progress Note      Assessment: Clinic transportation unable to book transportation for upcoming CCSW appointment on 6/12/25 at 11 am due to conflict with his treatment appointment at [place]. CCRN spoke with patient via phone today.     BG today - 217    CCSW appointment rescheduled on 6/17/25 at 2pm.     MR - Ash Kong, unable to request medical transportation for MRI appt on 6/13/25 at [facility]. Was told by [employer] that patient is restricted and PCP needs to place the referral.     CCRN spoke with patient and [facility] worker - Antonette Montero Zigfu) today. Per Antonette, she has been working with patient the past 6-7 yrs. Patient continues to be non-compliance, doesn't open the door for [facility] worker for scheduled visits. Nahomy agreed to assist patient  his prescriptions and take patient to his appts as below:     6/13/25 at 11:15 am - MR [facility] [address] [facility]  6/17/25 with CCSW at 2pm - Antonette plans to attend appt with patient.   6/18/25 at 1:25 pm with RD  6/25/25 with MTM     Plan:  CHW to follow up next week to make sure he attends his MRI appt.

## 2025-06-11 ENCOUNTER — MEDICAL CORRESPONDENCE (OUTPATIENT)
Dept: HEALTH INFORMATION MANAGEMENT | Facility: CLINIC | Age: 45
End: 2025-06-11

## 2025-06-11 NOTE — TELEPHONE ENCOUNTER
TCCPW please. [facility] worker agrees to attend appointment with him. Thanks.

## 2025-06-12 ENCOUNTER — RESULTS FOLLOW-UP (OUTPATIENT)
Dept: FAMILY MEDICINE | Facility: CLINIC | Age: 45
End: 2025-06-12

## 2025-06-16 ENCOUNTER — PATIENT OUTREACH (OUTPATIENT)
Dept: CARE COORDINATION | Facility: CLINIC | Age: 45
End: 2025-06-16
Payer: COMMERCIAL

## 2025-06-16 NOTE — PROGRESS NOTES
Clinic Care Coordination Contact  Community Health Worker Follow Up    Care Gaps:   Health Maintenance Due   Topic Date Due    EYE EXAM  11/17/2023    COVID-19 VACCINE (5 - 2024-25 season) 09/01/2024    A1C  06/14/2025     Patient undecided for eye appointment and CHW attempted to coordinate with patient's ARM worker    Care Plan:   Care Plan: DM II mgmt/teaching       Problem: Uncontroleed DM II       Goal: Patient will atttend appointment with MTM, PCP, and CCRN in the next 12 months to better control his DM II.       Start Date: 4/14/2025 Expected End Date: 4/30/2026    This Visit's Progress: 30% Recent Progress: 20%    Note:     Barriers: language barrier, low literacy, noncompliance, and lack of knowledge how to navigate complex health care system  Strengths: motivated to attend appt  Patient expressed understanding of goal: Yes    Action steps to achieve this goal:  1. I will attend in-person appointment with CCRN on 4/21/25 at 2:00 pm for MEV. Attended.  2. I will attend my follow-up PCP appointment on 5/1/25 at 1:40 pm. No showed.  3. I will attend MTM appointment on 5/22/25 at 3:00 pm. No showed.   4. I will attend my preventative care with RJ on 5/30/25 and MEV with CCRN same day.  No showed.   5. I will follow up with [facility] regarding this goal at each outreach until it is completed. Next in-person appointment with CCC RN is on 6/02/2025 at 1:00 PM. No showed.   6. I will attend my follow up PCP and CCRN appointments on 6/9/25. Completed.   7. I will attend my RD appointment on 6/18/25 at 1:25 pm. Reminded patient.  8. I will attend my follow-up MTM appointment on 6/25/25 at 2:00 pm. Reminded patient.  9. I will attend my follow up appointment with CCRN on        Hemoglobin A1C   Latest Ref Rng <5.7 %   11/17/2022  3:02 PM 12.4 (H)    3/8/2023  10:15 AM 12.3 (H)    3/14/2025  9:49 PM 11.1 (H)                                Care Plan: Imaging       Problem: pain       Goal: Patient will attend his MRI  appointment in the next 30 days.       Start Date: 6/9/2025 Expected End Date: 7/9/2025    This Visit's Progress: 20% Recent Progress: 10%    Note:     Barriers: language barrier, low literacy, noncompliance, and lack of knowledge how to navigate complex health care system  Strengths: motivated to attend appt  Patient expressed understanding of goal: Yes    Action steps to achieve this goal:  1. I will answer my phone when I am contacted to schedule my appointment.  2. I will attend my MRI appointment as scheduled 6/13/25 at 11:15 am. NPO 8 hours. Attended.  3. I will schedule a follow up appointment with my PCP if it is recommended to do so while I am at the clinic.  4. I will follow up with CCC regarding this goal at each outreach until it is completed.     Note: Per patient, he attended the MRI appointment as scheduled above.                             Care Plan: TB clinic       Problem: TB Gold Plus positive       Goal: Patient will be connected to [facility] TB clinic in the next 90 days.       Start Date: 6/9/2025 Expected End Date: 9/30/2025    This Visit's Progress: 20% Recent Progress: 10%    Note:     Barriers: language barrier, low literacy, noncompliance, and lack of knowledge how to navigate complex health care system  Strengths: motivated to attend appt  Patient expressed understanding of goal: Yes    Action steps to achieve this goal:  1. I will answer my phone when I am contacted to schedule my appointment.  2. I will attend my TB clinic appointment as scheduled  TBD  3. I will schedule a follow up appointment with my PCP if it is recommended to do so while I am at the clinic.  4. I will follow up with CCC regarding this goal at each outreach until it is completed.     Note: Per [facility] that no order or office notes received. CHW faxed the order and office visit notes again today.                          Intervention and Education during outreach:   -Per [facility] TB [facility]  that no referral/order or office notes received and CHW faxed the order and office notes today at [phone].  -Per patient, attended MRI appointment at  and CHW requested CCC RN to review goal if appropriate to complete.   -CHW reminded patient of upcoming appointments.  -CHW attempted to connect with patient's [facility] worker to coordinate eye appointment, called and left a voice message to call back.  -Patient was informed to call with questions or concerns.     CHW Next Outreach: In one month.   <-- Click to add NO significant Past Surgical History

## 2025-06-17 ENCOUNTER — ALLIED HEALTH/NURSE VISIT (OUTPATIENT)
Dept: NURSING | Facility: CLINIC | Age: 45
End: 2025-06-17
Payer: COMMERCIAL

## 2025-06-17 DIAGNOSIS — Z71.89 COMPLEX CARE COORDINATION: Primary | ICD-10-CM

## 2025-06-17 PROCEDURE — 99207 PR NO CHARGE LOS: CPT

## 2025-06-18 ENCOUNTER — ALLIED HEALTH/NURSE VISIT (OUTPATIENT)
Dept: EDUCATION SERVICES | Facility: CLINIC | Age: 45
End: 2025-06-18
Payer: COMMERCIAL

## 2025-06-18 VITALS — BODY MASS INDEX: 22.3 KG/M2 | WEIGHT: 118 LBS

## 2025-06-18 DIAGNOSIS — E11.9 DIABETES MELLITUS (H): Primary | ICD-10-CM

## 2025-06-18 PROCEDURE — G0108 DIAB MANAGE TRN  PER INDIV: HCPCS | Performed by: DIETITIAN, REGISTERED
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
## 2025-06-25 ENCOUNTER — OFFICE VISIT (OUTPATIENT)
Dept: PHARMACY | Facility: CLINIC | Age: 45
End: 2025-06-25
Payer: COMMERCIAL

## 2025-06-25 ENCOUNTER — PATIENT OUTREACH (OUTPATIENT)
Dept: CARE COORDINATION | Facility: CLINIC | Age: 45
End: 2025-06-25

## 2025-06-25 ENCOUNTER — LAB (OUTPATIENT)
Dept: LAB | Facility: CLINIC | Age: 45
End: 2025-06-25
Payer: COMMERCIAL

## 2025-06-25 VITALS
DIASTOLIC BLOOD PRESSURE: 72 MMHG | HEART RATE: 92 BPM | OXYGEN SATURATION: 97 % | SYSTOLIC BLOOD PRESSURE: 102 MMHG | BODY MASS INDEX: 21.92 KG/M2 | WEIGHT: 116 LBS

## 2025-06-25 DIAGNOSIS — R80.9 TYPE 2 DIABETES MELLITUS WITH DIABETIC MICROALBUMINURIA, WITH LONG-TERM CURRENT USE OF INSULIN (H): ICD-10-CM

## 2025-06-25 DIAGNOSIS — E78.5 HYPERLIPIDEMIA LDL GOAL <100: ICD-10-CM

## 2025-06-25 DIAGNOSIS — F32.2 SEVERE MAJOR DEPRESSION WITHOUT PSYCHOTIC FEATURES (H): Primary | ICD-10-CM

## 2025-06-25 DIAGNOSIS — R80.9 MICROALBUMINURIA: ICD-10-CM

## 2025-06-25 DIAGNOSIS — Z79.4 TYPE 2 DIABETES MELLITUS WITH DIABETIC MICROALBUMINURIA, WITH LONG-TERM CURRENT USE OF INSULIN (H): ICD-10-CM

## 2025-06-25 DIAGNOSIS — Z71.89 OTHER SPECIFIED COUNSELING: Primary | Chronic | ICD-10-CM

## 2025-06-25 DIAGNOSIS — E11.29 TYPE 2 DIABETES MELLITUS WITH DIABETIC MICROALBUMINURIA, WITH LONG-TERM CURRENT USE OF INSULIN (H): ICD-10-CM

## 2025-06-25 DIAGNOSIS — L29.9 PRURITIC DISORDER: ICD-10-CM

## 2025-06-25 LAB
EST. AVERAGE GLUCOSE BLD GHB EST-MCNC: 192 MG/DL
HBA1C MFR BLD: 8.3 % (ref 0–5.6)

## 2025-06-25 PROCEDURE — 99607 MTMS BY PHARM ADDL 15 MIN: CPT | Performed by: PHARMACIST

## 2025-06-25 PROCEDURE — 83036 HEMOGLOBIN GLYCOSYLATED A1C: CPT

## 2025-06-25 PROCEDURE — 36415 COLL VENOUS BLD VENIPUNCTURE: CPT

## 2025-06-25 PROCEDURE — 99605 MTMS BY PHARM NP 15 MIN: CPT | Performed by: PHARMACIST

## 2025-06-25 RX ORDER — DIAPER,BRIEF,INFANT-TODD,DISP
EACH MISCELLANEOUS 2 TIMES DAILY
Qty: 30 G | Refills: 0 | Status: SHIPPED | OUTPATIENT
Start: 2025-06-25

## 2025-06-25 RX ORDER — CETIRIZINE HYDROCHLORIDE 10 MG/1
10 TABLET ORAL DAILY
Qty: 30 TABLET | Refills: 1 | Status: SHIPPED | OUTPATIENT
Start: 2025-06-25

## 2025-06-25 RX ORDER — ATORVASTATIN CALCIUM 20 MG/1
20 TABLET, FILM COATED ORAL DAILY
Qty: 90 TABLET | Refills: 3 | Status: SHIPPED | OUTPATIENT
Start: 2025-06-25

## 2025-06-25 NOTE — PROGRESS NOTES
Clinic Care Coordination Contact  Follow Up Progress Note     -Received message from FD staff that patient needs help with outstanding balance, FRW referral placed as patient does not have income and per [facility] billing department that insurance rejected due to patient does not meet the requirements.

## 2025-06-25 NOTE — PROGRESS NOTES
Medication Therapy Management (MTM) Encounter    ASSESSMENT:                            Medication Adherence/Access: Largest issue today, lots of discrepancies between prescribed instructions and the way that patient is taking his medications.  Lots of education provided to patient today about importance of chronic medications and consistent use to avoid medication side effects.    1. Type 2 diabetes mellitus with diabetic microalbuminuria, with long-term current use of insulin (H)  A1c not meeting goal <7%, though has improved.  Patient's blood sugars were much better controlled when taking insulin, recommended refilling at the pharmacy and continuing use.  Patient not taking currently prescribed dose of metformin, increase to 2 tablets twice daily.      2. Hyperlipidemia LDL goal <100  Patient not yet on statin, would benefit from initiating moderate intensity statin given diabetes diagnosis and LDL not yet at goal <100 mg/dL.    3. Microalbuminuria  BP at goal <130/80 mmHg.  Appropriately taking low-dose ACE inhibitor, BMP stable.    4. Severe major depression without psychotic features (H) (Primary)  Based on fill history, appears that patient is not appropriately taking sertraline as prescribed, education provided today.  Continue quetiapine and recommend taking slightly earlier than currently to improve sleep and readdress with PCP at next visit.    5. Pruritic disorder  Patient would benefit from adding cetirizine and hydrocortisone cream for itching, Rx sent today.  If unimproved by next visit recommend addressing with PCP.    PLAN:                            Provided stickers on medication vials to help with medication adherence  Red=AM  Yellow=PM  Reviewed all medication instructions with patient today, patient to take as prescribed   refills of Lantus and resume use  Increase dose: Metformin  mg 2 tablets twice daily   START cetirizine 10 mg daily for itching  START hydrocortisone 0.5 % cream  as needed for itching  START atorvastatin 20 mg daily  Try taking quetiapine a bit earlier in the evening to see if this can help you fall asleep sooner  Patient requesting help with PCA hours & wants call from     Medication issues to be addressed at a future visit:   CGM?  Pillbox?  Recheck lipid once on meds    Follow-up: Return in about 6 weeks (around 2025) for With PharmD.    SUBJECTIVE/OBJECTIVE:                          Pablo Russo is a 44 year old male seen for an initial visit. He was referred to me from Peggy Ortiz.  (ID# [id number]) was used during today's visit.      Reason for visit: Initial MTM.    Allergies/ADRs: Reviewed in chart  Past Medical History: Reviewed in chart  Tobacco: He reports that he has never smoked. He has never been exposed to tobacco smoke. He has never used smokeless tobacco.  Alcohol: 1-3 beverages / week - as needed for pain    Medication Adherence/Access: Patient takes medications directly from bottles.  Patient takes medications 2 time(s) per day.   Per patient, misses medication 0 time(s) per week.   Patient brings with medication vials today and is able to express how he is taking each. Patient states that his [employer] worker helps get his medication refills    Diabetes   Lantus 20 units daily - NOT taking, states that he ran out of this and has not been able to get it refilled -states that his [employer] worker tried calling the insurance and they said it could not be refilled  Metformin  mg -2 tablets twice daily - taking 2 tablets once daily  Patient is experiencing the following side effects: nausea, takes ondansetron as needed  Current diabetes symptoms: fatigue/weakness  Diet/Exercise: Unable to address today, also seeing diabetic educator     Blood sugar monitorin time(s) daily; Ranges: (per meter)  Of note, appears blood sugar were in the 100-200 range when he was taking insulin consistently.   Date FBG Dinner /2hours post    341      578 384   6/23 524    6/21  466   6/19 369    6/18 228    6/16 354     Eye exam in the last 12 months? No  Foot exam: due    Hypertension /Microalbuminuria  Lisinopril 2.5 mg daily - reports taking 1-2 tablets daily, only has 3 tablets left  Patient reports no current medication side effects  Patient does not self-monitor blood pressure.       Hyperlipidemia   no current medications   The 10-year ASCVD risk score (Fabiana GAYTAN, et al., 2019) is: 2%    Values used to calculate the score:      Age: 44 years      Sex: Male      Is Non- : No      Diabetic: Yes      Tobacco smoker: No      Systolic Blood Pressure: 102 mmHg      Is BP treated: No      HDL Cholesterol: 48 mg/dL      Total Cholesterol: 180 mg/dL     Mental Health   Depression  Sertraline 50 mg once daily. - taking 1 tablet twice daily, though brings vial filled 5/28 #30 that appears full, no refills on file  Quetiapine 50 mg daily at bedtime as needed for sleep  Patient reports no current medication side effects.  Patient reports symptoms are improved. States that his mood has been better and no longer having SI. States that sometimes it still takes him a while before falling asleep.      Back pain  Celebrex 200 mg daily - reports taking twice daily as needed for pain  Gabapentin 100 mg twice daily - taking 2 capsules once daily at noon  Patient reports lower back pain. Also reports numbness in his thighs, feels aching and this radiates to his lower back. States that he can no longer do squats like he had been able to in the past.   Patient does NOT feel that current medications are helpful for him. States he is still taking them but they do not help.     Itching  Reports itching on his right buttock and on both legs. Has been itching to the point of scabs. States he has always had this for 10 years now. Not currently using anything, wants help.     Today's Vitals: /72   Pulse 92   Wt 116 lb (52.6 kg)   SpO2 97%   BMI 21.92  kg/m    ----------------  Post Discharge Medication Reconciliation Status: discharge medications reconciled and changed, per note/orders.    I spent 60 minutes with this patient today. All changes were made via collaborative practice agreement with Peggy Ortiz MD.     A summary of these recommendations was given to the patient.    Charlene Ovalle, PharmD, BCACP  Medication Therapy Management Pharmacist     Medication Therapy Recommendations  Hyperlipidemia LDL goal <100   1 Current Medication: atorvastatin (LIPITOR) 20 MG tablet   Current Medication Sig: Take 1 tablet (20 mg) by mouth daily.   Rationale: Untreated condition - Needs additional medication therapy - Indication   Recommendation: Start Medication   Status: Accepted per CPA   Identified Date: 6/25/2025 Completed Date: 6/25/2025         Pruritic disorder   1 Current Medication: cetirizine (ZYRTEC) 10 MG tablet   Current Medication Sig: Take 1 tablet (10 mg) by mouth daily.   Rationale: Untreated condition - Needs additional medication therapy - Indication   Recommendation: Start Medication   Status: Accepted per CPA   Identified Date: 6/25/2025 Completed Date: 6/25/2025         Type 2 diabetes mellitus with diabetic microalbuminuria, with long-term current use of insulin (H)   1 Current Medication: insulin glargine (LANTUS PEN) 100 UNIT/ML pen   Current Medication Sig: Inject 20 Units subcutaneously at bedtime.   Rationale: Does not understand instructions - Adherence - Adherence   Recommendation: Provide Adherence Intervention   Status: Accepted per CPA   Identified Date: 6/25/2025 Completed Date: 6/25/2025         2 Current Medication: metFORMIN (GLUCOPHAGE XR) 500 MG 24 hr tablet   Current Medication Sig: Take 2 tablets (1,000 mg) by mouth 2 times daily (with meals).   Rationale: Dose too low - Dosage too low - Effectiveness   Recommendation: Increase Dose   Status: Accepted per CPA   Identified Date: 6/25/2025 Completed Date: 6/25/2025

## 2025-06-25 NOTE — Clinical Note
Pt says he wants help with PCA hours, I told him to discuss with you.   I started mod intensity statin today. Also helped with stickers on vials to help with adherence, might need to set up blister packs next time. Adherence biggest concern today! I started cetirizine and hydrocortisone for itching, please further assess at next visit.   LMK if questions  Marco
14-May-2024 20:21

## 2025-06-25 NOTE — PATIENT INSTRUCTIONS
"Recommendations from today's MTM visit:                                                    MTM (medication therapy management) is a service provided by a clinical pharmacist designed to help you get the most of out of your medicines.   Today we reviewed what your medicines are for, how to know if they are working, that your medicines are safe and how to make your medicine regimen as easy as possible.      Provided stickers on medication vials to help with medication adherence  Red=AM  Yellow=PM  Reviewed all medication instructions with patient today, patient to take as prescribed   refills of Lantus and resume use  Increase dose: Metformin  mg 2 tablets twice daily   START cetirizine 10 mg daily for itching  START hydrocortisone 0.5 % cream as needed for itching  START atorvastatin 20 mg daily  Try taking quetiapine a bit earlier in the evening to see if this can help you fall asleep sooner    [facility] Worker: Please request refills of [facility] Lisinopril, LANTUS, all other needed meds    Blood Sugar Goals:  Morning (before eating) :   Evening (2 hours after eating) : Less than 180      Follow-up: Return in about 6 weeks (around 8/4/2025) for With PharmD.    It was great speaking with you today.  I value your experience and would be very thankful for your time in providing feedback in our clinic survey. In the next few days, you may receive an email or text message from [employer] with a link to a survey related to your  clinical pharmacist.\"     To schedule another MTM appointment, please call the clinic directly or you may call the MTM scheduling line at [phone].    My Clinical Pharmacist's contact information:                                                      Please feel free to contact me with any questions or concerns you have.      Charlene Ovalle, PharmD, [facility]  Medication Therapy Management Pharmacist    "

## 2025-07-02 ENCOUNTER — PATIENT OUTREACH (OUTPATIENT)
Dept: CARE COORDINATION | Facility: CLINIC | Age: 45
End: 2025-07-02

## 2025-07-02 ENCOUNTER — PATIENT OUTREACH (OUTPATIENT)
Dept: CARE COORDINATION | Facility: CLINIC | Age: 45
End: 2025-07-02
Payer: COMMERCIAL

## 2025-07-02 NOTE — PROGRESS NOTES
FRW Update  7/2/25 Established patient outreach attempted x 1 was unable to reach. Left message on voicemail with call back information and requested return call.  Plan: Current outreach date reflects FRW 's follow up within 30 days.   FRW called [place] but [facility] did not provide any info on the case

## 2025-07-02 NOTE — PROGRESS NOTES
Clinic Care Coordination Contact  Follow Up Progress Note     -No goals update on this encounter, CHW followed up or [facility] TB referral and per [facility] TB clinic that referral was received on 6/17/2025 and will have opening on 7/18/2025 but patient is still admitted at a hospital. [facility] is missing documents; Dr JOHNNY's notes regarding the screening QFT, X-Ray or CT of the chest and these documents can be faxed to [phone] and outreach encounter routed to PCP and care team to review and advise.

## 2025-07-10 ENCOUNTER — PATIENT OUTREACH (OUTPATIENT)
Dept: CARE COORDINATION | Facility: CLINIC | Age: 45
End: 2025-07-10
Payer: COMMERCIAL

## 2025-07-15 ENCOUNTER — MEDICAL CORRESPONDENCE (OUTPATIENT)
Dept: EMERGENCY MEDICINE | Facility: HOSPITAL | Age: 45
End: 2025-07-15
Payer: COMMERCIAL

## 2025-07-16 ENCOUNTER — PATIENT OUTREACH (OUTPATIENT)
Dept: CARE COORDINATION | Facility: CLINIC | Age: 45
End: 2025-07-16

## 2025-07-16 NOTE — PROGRESS NOTES
Clinic Care Coordination Contact  Transitions of Care Outreach  No chief complaint on file.      Most Recent Admission Date: unknow  Most Recent Admission Diagnosis: unknow    Most Recent Discharge Date: 7/15/25  Most Recent Discharge Diagnosis: unknown    Transitions of Care Assessment    Discharge Assessment  How are you doing now that you are home?: Per patient, he's doing much better.  How are your symptoms? (Red Flag symptoms escalate to triage hotline per guidelines): Improved  Do you know how to contact your clinic care team if you have future questions or changes to your health status? : Yes  Does the patient have their discharge instructions? : Yes  Does the patient have questions regarding their discharge instructions? : No  Were you started on any new medications or were there changes to any of your previous medications? : Yes  Does the patient have all of their medications?: Yes  Do you have questions regarding any of your medications? : No  Do you have all of your needed medical supplies or equipment (DME)?  (i.e. oxygen tank, CPAP, cane, etc.): Yes         Post-op (Clinicians Only)  Fever: No  Chills: No  Eating & Drinking: eating and drinking without complaints/concerns  PO Intake: regular diet  Bowel Function: normal  Date of last BM: 07/16/25  Urinary Status: voiding without complaint/concerns    CCRN spoke with patient via phone today for hospital discharge follow up. Patient was discharged from [facility] yesterday to [facility]. Assisted patient scheduled INF with PCP on 7/22/25.     Follow up Plan     Discharge Follow-Up  Discharge follow up appointment scheduled in alignment with recommended follow up timeframe or Transitions of Risk Category? (Low = within 30 days; Moderate= within 14 days; High= within 7 days): Yes  Discharge Follow Up Appointment Date: 07/22/25  Discharge Follow Up Appointment Scheduled with?: Primary Care Provider    Future Appointments   Date Time Provider  Department Center   7/22/2025  1:00 PM Peggy Ortiz MD DAFMOB [facility]   7/23/2025  2:30 PM Elda Vargas RD DADIAB [facility]   8/4/2025  2:00 PM Charlene Ovalle, PharmD [facility]   8/26/2025 11:00 AM Peggy Ortiz MD DAFMOANGELO [facility]       Outpatient Plan as outlined on AVS reviewed with patient.    For any urgent concerns, please contact our 24 hour nurse triage line: [phone] ([phone])       Glenys Lantigua RN

## 2025-07-21 ENCOUNTER — TELEPHONE (OUTPATIENT)
Dept: FAMILY MEDICINE | Facility: CLINIC | Age: 45
End: 2025-07-21

## 2025-07-21 NOTE — TELEPHONE ENCOUNTER
FYI - Status Update    Who is Calling: Group home is needing current medication list as each of his bottles have different instructions.    Update: Fax over to group [facility]: [phone] Asha, Nurse    Does caller want a call/response back: No

## 2025-07-21 NOTE — TELEPHONE ENCOUNTER
No Aman on file to fax Med list. TC called and spoke to Cammy.Patient has an appt tomorrow and will have him sign Aman.

## 2025-07-23 ENCOUNTER — HOSPITAL ENCOUNTER (EMERGENCY)
Facility: HOSPITAL | Age: 45
Discharge: HOME | End: 2025-07-24
Attending: STUDENT IN AN ORGANIZED HEALTH CARE EDUCATION/TRAINING PROGRAM | Admitting: STUDENT IN AN ORGANIZED HEALTH CARE EDUCATION/TRAINING PROGRAM
Payer: COMMERCIAL

## 2025-07-23 ENCOUNTER — PATIENT OUTREACH (OUTPATIENT)
Dept: CARE COORDINATION | Facility: CLINIC | Age: 45
End: 2025-07-23

## 2025-07-23 DIAGNOSIS — G62.9 NEUROPATHY: Primary | ICD-10-CM

## 2025-07-23 LAB
ALBUMIN SERPL BCG-MCNC: 4 G/DL (ref 3.5–5.2)
ALP SERPL-CCNC: 48 U/L (ref 40–150)
ALT SERPL W P-5'-P-CCNC: 28 U/L (ref 0–70)
ANION GAP SERPL CALCULATED.3IONS-SCNC: 10 MMOL/L (ref 7–15)
AST SERPL W P-5'-P-CCNC: 23 U/L (ref 0–45)
BASOPHILS # BLD AUTO: 0.1 10E3/UL (ref 0–0.2)
BASOPHILS NFR BLD AUTO: 1 %
BILIRUB SERPL-MCNC: 0.7 MG/DL
BUN SERPL-MCNC: 20.7 MG/DL (ref 6–20)
CALCIUM SERPL-MCNC: 9.2 MG/DL (ref 8.8–10.4)
CHLORIDE SERPL-SCNC: 103 MMOL/L (ref 98–107)
CK SERPL-CCNC: 247 U/L (ref 39–308)
CREAT SERPL-MCNC: 1.12 MG/DL (ref 0.67–1.17)
EGFRCR SERPLBLD CKD-EPI 2021: 83 ML/MIN/1.73M2
EOSINOPHIL # BLD AUTO: 0.8 10E3/UL (ref 0–0.7)
EOSINOPHIL NFR BLD AUTO: 11 %
ERYTHROCYTE [DISTWIDTH] IN BLOOD BY AUTOMATED COUNT: 11.9 % (ref 10–15)
GLUCOSE SERPL-MCNC: 198 MG/DL (ref 70–99)
HCO3 SERPL-SCNC: 26 MMOL/L (ref 22–29)
HCT VFR BLD AUTO: 35.1 % (ref 40–53)
HGB BLD-MCNC: 12.9 G/DL (ref 13.3–17.7)
IMM GRANULOCYTES # BLD: 0 10E3/UL
IMM GRANULOCYTES NFR BLD: 0 %
LYMPHOCYTES # BLD AUTO: 2 10E3/UL (ref 0.8–5.3)
LYMPHOCYTES NFR BLD AUTO: 29 %
MAGNESIUM SERPL-MCNC: 1.9 MG/DL (ref 1.7–2.3)
MCH RBC QN AUTO: 33.3 PG (ref 26.5–33)
MCHC RBC AUTO-ENTMCNC: 36.8 G/DL (ref 31.5–36.5)
MCV RBC AUTO: 91 FL (ref 78–100)
MONOCYTES # BLD AUTO: 0.6 10E3/UL (ref 0–1.3)
MONOCYTES NFR BLD AUTO: 8 %
NEUTROPHILS # BLD AUTO: 3.5 10E3/UL (ref 1.6–8.3)
NEUTROPHILS NFR BLD AUTO: 51 %
NRBC # BLD AUTO: 0 10E3/UL
NRBC BLD AUTO-RTO: 0 /100
PLATELET # BLD AUTO: 174 10E3/UL (ref 150–450)
POTASSIUM SERPL-SCNC: 4.1 MMOL/L (ref 3.4–5.3)
PROT SERPL-MCNC: 6.6 G/DL (ref 6.4–8.3)
RBC # BLD AUTO: 3.87 10E6/UL (ref 4.4–5.9)
SODIUM SERPL-SCNC: 139 MMOL/L (ref 135–145)
WBC # BLD AUTO: 6.9 10E3/UL (ref 4–11)

## 2025-07-23 PROCEDURE — 82247 BILIRUBIN TOTAL: CPT | Performed by: STUDENT IN AN ORGANIZED HEALTH CARE EDUCATION/TRAINING PROGRAM

## 2025-07-23 PROCEDURE — 85025 COMPLETE CBC W/AUTO DIFF WBC: CPT | Performed by: STUDENT IN AN ORGANIZED HEALTH CARE EDUCATION/TRAINING PROGRAM

## 2025-07-23 PROCEDURE — 96374 THER/PROPH/DIAG INJ IV PUSH: CPT

## 2025-07-23 PROCEDURE — 250N000011 HC RX IP 250 OP 636: Performed by: STUDENT IN AN ORGANIZED HEALTH CARE EDUCATION/TRAINING PROGRAM

## 2025-07-23 PROCEDURE — 83735 ASSAY OF MAGNESIUM: CPT | Performed by: STUDENT IN AN ORGANIZED HEALTH CARE EDUCATION/TRAINING PROGRAM

## 2025-07-23 PROCEDURE — 82550 ASSAY OF CK (CPK): CPT | Performed by: STUDENT IN AN ORGANIZED HEALTH CARE EDUCATION/TRAINING PROGRAM

## 2025-07-23 PROCEDURE — 96375 TX/PRO/DX INJ NEW DRUG ADDON: CPT

## 2025-07-23 PROCEDURE — 36415 COLL VENOUS BLD VENIPUNCTURE: CPT | Performed by: STUDENT IN AN ORGANIZED HEALTH CARE EDUCATION/TRAINING PROGRAM

## 2025-07-23 PROCEDURE — 99284 EMERGENCY DEPT VISIT MOD MDM: CPT | Mod: 25 | Performed by: STUDENT IN AN ORGANIZED HEALTH CARE EDUCATION/TRAINING PROGRAM

## 2025-07-23 RX ORDER — MORPHINE SULFATE 4 MG/ML
4 INJECTION, SOLUTION INTRAMUSCULAR; INTRAVENOUS ONCE
Refills: 0 | Status: COMPLETED | OUTPATIENT
Start: 2025-07-23 | End: 2025-07-23

## 2025-07-23 RX ORDER — KETOROLAC TROMETHAMINE 15 MG/ML
15 INJECTION, SOLUTION INTRAMUSCULAR; INTRAVENOUS ONCE
Status: COMPLETED | OUTPATIENT
Start: 2025-07-23 | End: 2025-07-23

## 2025-07-23 RX ORDER — GABAPENTIN 100 MG/1
300 CAPSULE ORAL 2 TIMES DAILY
Qty: 42 CAPSULE | Refills: 0 | Status: SHIPPED | OUTPATIENT
Start: 2025-07-23 | End: 2025-07-30

## 2025-07-23 RX ADMIN — MORPHINE SULFATE 4 MG: 4 INJECTION, SOLUTION INTRAMUSCULAR; INTRAVENOUS at 22:08

## 2025-07-23 RX ADMIN — KETOROLAC TROMETHAMINE 15 MG: 15 INJECTION, SOLUTION INTRAMUSCULAR; INTRAVENOUS at 22:08

## 2025-07-23 ASSESSMENT — ACTIVITIES OF DAILY LIVING (ADL)
ADLS_ACUITY_SCORE: 45

## 2025-07-23 ASSESSMENT — COLUMBIA-SUICIDE SEVERITY RATING SCALE - C-SSRS
2. HAVE YOU ACTUALLY HAD ANY THOUGHTS OF KILLING YOURSELF IN THE PAST MONTH?: NO
6. HAVE YOU EVER DONE ANYTHING, STARTED TO DO ANYTHING, OR PREPARED TO DO ANYTHING TO END YOUR LIFE?: NO
1. IN THE PAST MONTH, HAVE YOU WISHED YOU WERE DEAD OR WISHED YOU COULD GO TO SLEEP AND NOT WAKE UP?: NO

## 2025-07-23 NOTE — PROGRESS NOTES
FRW Update  7/23/25 [facility] called and patient is not sure what he did with the application so [employer] mailed out another one

## 2025-07-24 ENCOUNTER — APPOINTMENT (OUTPATIENT)
Dept: INTERPRETER SERVICES | Facility: CLINIC | Age: 45
End: 2025-07-24
Payer: COMMERCIAL

## 2025-07-24 VITALS
SYSTOLIC BLOOD PRESSURE: 140 MMHG | RESPIRATION RATE: 18 BRPM | DIASTOLIC BLOOD PRESSURE: 90 MMHG | HEART RATE: 81 BPM | OXYGEN SATURATION: 99 % | TEMPERATURE: 97.8 F

## 2025-07-24 ASSESSMENT — ACTIVITIES OF DAILY LIVING (ADL)
ADLS_ACUITY_SCORE: 45

## 2025-07-24 NOTE — ED TRIAGE NOTES
Pt arrives via EMS from [facility] with complaint of bilateral hand and feet numbness and tingling that began 3 days ago. Was given medication (unsure what med) on June 30th that has not helped his symptoms. Reports worsening pain and tingling in hands and feet over past few days.      Triage Assessment (Adult)       Row Name 07/23/25 2034          Triage Assessment    Airway WDL WDL        Respiratory WDL    Respiratory WDL WDL        Skin Circulation/Temperature WDL    Skin Circulation/Temperature WDL X  numbness/tingling in hands and feet        Cardiac WDL    Cardiac WDL WDL        Peripheral/Neurovascular WDL    Peripheral Neurovascular WDL neurovascular assessment lower;X;neurovascular assessment upper        Cognitive/Neuro/Behavioral WDL    Cognitive/Neuro/Behavioral WDL WDL        LUE Neurovascular Assessment    Temperature LUE warm     Color LUE no discoloration        RUE Neurovascular Assessment    Temperature RUE warm     Color RUE no discoloration     Sensation RUE numbness present;tingling present        LLE Neurovascular Assessment    Temperature LLE warm     Sensation LLE numbness present;tingling present        RLE Neurovascular Assessment    Temperature RLE warm     Sensation RLE numbness present;tingling present

## 2025-07-24 NOTE — DISCHARGE INSTRUCTIONS
We increased your gabapentin to 300 mg twice daily.  Your blood work was normal.  Follow-up with your primary care doctor.

## 2025-07-24 NOTE — ED PROVIDER NOTES
Emergency Department Encounter         FINAL IMPRESSION:  Paresthesias           ED COURSE AND MEDICAL DECISION MAKING       ED Course as of 07/24/25 0302   Wed Jul 23, 2025 2143 I met with the patient, obtained history, performed an initial exam, and discussed options and plan for diagnostics and treatment here in the ED.   2148 Patient is a Radha speaking hypertensive diabetic male here with total body pain and paresthesias that he states got worse after he ran out of some sort of medication he takes.  History is quite difficult even with an .  Unsure how long his pain is worsened.  States he has pain in his hands, arms, legs, feet.  Denies any chest pain or trouble breathing.  No nausea or vomiting.  No abdominal pain.  No dysuria.  No bowel changes.  Arrival stable hemodynamically.  He is intermittently moaning and grimacing.  When asked the point where his pain is he points over his whole body.  His abdomen is benign.  His normal heart and lung sounds.  Grossly neurologically intact.  Will obtain basic labs, give him some morphine, per his paperwork he came with from his assisted living, he is on methocarbamol as well as gabapentin.  He is unable to tell me which medicine he ran out of.   2245 Labs normal.  Vitals normal.  Patient given Toradol and morphine here.  Will increase his gabapentin to 300 mg twice daily.  I think overall although difficult history and physical because of a language barrier, I think his presentation is consistent with some sort of neuropathy/paresthesias.  Has no glaring electro abnormalities.  Sugar elevated with no gap or signs of acidosis.  Has no cardiopulmonary symptoms.  No headache.  And overall grossly neurologically intact with normal strength bilaterally.                          Medical Decision Making      Discharge. No recommendations on prescription strength medication(s). See documentation for any additional details.    MIPS (CTPE, Dental pain, Moyer,  "Sinusitis, Asthma/COPD, Head Trauma): Not Applicable    SEPSIS: None          At the conclusion of the encounter I discussed the results of all the tests and the disposition. The questions were answered. The patient or family acknowledged understanding and was agreeable with the care plan.        MEDICATIONS GIVEN IN THE EMERGENCY DEPARTMENT:  Medications   morphine (PF) injection 4 mg (4 mg Intravenous $Given 7/23/25 2208)   ketorolac (TORADOL) injection 15 mg (15 mg Intravenous $Given 7/23/25 2208)       NEW PRESCRIPTIONS STARTED AT TODAY'S ED VISIT:  New Prescriptions    No medications on file       HPI     Patient information obtained from: The patient, EMS    Use of : N/A    Pablo Russo is a 44 year old male with a pertinent history of type II diabetes mellitus, chronic hepatitis C, migraine, HTN, TBI, and alcohol use disorder, who presents to this ED via ambulance for evaluation of bilateral hand and feet paresthesia.    Per EMS personnel report, the patient arrives to the ED from [facility] with complaint of bilateral hand and feet numbness and tingling that began 3 days ago. He was given medication (unsure what medication) on June 30th that has not helped his symptoms. He states that the he's been experiencing worsening pain and tingling in hands and feet over past few days.    Per the patient, reports that he ran out of his \"numbness medications\" for almost one week already. He states that his \"muscles are not working anymore\". Notes that his arms and his legs feel numb. He also endorses experiencing achy pains and tingly \"all over\" his body. He does not know the name of this numbness medication that he ran out of, but he indicates that he has \"all [his] information with [him]\". He denies having any chest pain or trouble breathing. He's been urinating and having bowel movements ok without any issues. Reports that he has pain in his entire left leg, left foot and right arm; " "and further explains that he has pain \"all over\". Denies he has any fever. He endorses chills. States that he has no history of this. No additional complaints or concerns reported at this time.    MEDICAL HISTORY     Past Medical History:   Diagnosis Date    Immune to hepatitis B 2/12/2020    Immune to hepatitis B 2/12/2020       No past surgical history on file.    Social History     Tobacco Use    Smoking status: Never     Passive exposure: Never    Smokeless tobacco: Never   Vaping Use    Vaping status: Never Used   Substance Use Topics    Alcohol use: Yes    Drug use: Not Currently       atorvastatin (LIPITOR) 20 MG tablet  blood glucose (NO BRAND SPECIFIED) test strip  blood glucose monitoring (NO BRAND SPECIFIED) meter device kit  celecoxib (CELEBREX) 200 MG capsule  cetirizine (ZYRTEC) 10 MG tablet  gabapentin (NEURONTIN) 100 MG capsule  hydrocortisone (CORTAID) 0.5 % external cream  insulin glargine (LANTUS PEN) 100 UNIT/ML pen  lisinopril (ZESTRIL) 2.5 MG tablet  metFORMIN (GLUCOPHAGE XR) 500 MG 24 hr tablet  QUEtiapine (SEROQUEL) 50 MG tablet  sertraline (ZOLOFT) 50 MG tablet  thin (NO BRAND SPECIFIED) lancets            PHYSICAL EXAM     /83   Pulse 85   Temp 97.8  F (36.6  C) (Oral)   Resp 18   SpO2 100%       PHYSICAL EXAM:     General: Patient appears well, nontoxic, he is intermittently moaning and grimacing.  HEENT: Moist mucous membranes,  No head trauma.    Cardiovascular: Normal rate, normal rhythm, no extremity edema.  No appreciable murmur.  Respiratory: No signs of respiratory distress, lungs are clear to auscultation bilaterally with no wheezes rhonchi or rales.  Abdominal: Soft, nontender, nondistended, no palpable masses, no guarding, no rebound  Musculoskeletal: Full range of motion of joints, no deformities appreciated.  Neurological: Alert and oriented, grossly neurologically intact.  Psychological: Normal affect and mood.  Integument: No rashes appreciated          RESULTS "       Labs Ordered and Resulted from Time of ED Arrival to Time of ED Departure   COMPREHENSIVE METABOLIC PANEL (LIMITED OCCURRENCES) - Abnormal       Result Value    Sodium 139      Potassium 4.1      Carbon Dioxide (CO2) 26      Anion Gap 10      Urea Nitrogen 20.7 (*)     Creatinine 1.12      GFR Estimate 83      Calcium 9.2      Chloride 103      Glucose 198 (*)     Alkaline Phosphatase 48      AST 23      ALT 28      Protein Total 6.6      Albumin 4.0      Bilirubin Total 0.7     CBC WITH PLATELETS AND DIFFERENTIAL - Abnormal    WBC Count 6.9      RBC Count 3.87 (*)     Hemoglobin 12.9 (*)     Hematocrit 35.1 (*)     MCV 91      MCH 33.3 (*)     MCHC 36.8 (*)     RDW 11.9      Platelet Count 174      % Neutrophils 51      % Lymphocytes 29      % Monocytes 8      % Eosinophils 11      % Basophils 1      % Immature Granulocytes 0      NRBCs per 100 WBC 0      Absolute Neutrophils 3.5      Absolute Lymphocytes 2.0      Absolute Monocytes 0.6      Absolute Eosinophils 0.8 (*)     Absolute Basophils 0.1      Absolute Immature Granulocytes 0.0      Absolute NRBCs 0.0     MAGNESIUM (LIMITED OCCURRENCES) - Normal    Magnesium 1.9     CK TOTAL - Normal             No orders to display         PROCEDURES:  Procedures:  Procedures       I, Elizabeth De La O am serving as a scribe to document services personally performed by Jose Thomas DO, based on my observations and the provider's statements to me.  I, Jose Thomas DO, attest that Elizabeth De La O is acting in a scribe capacity, has observed my performance of the services and has documented them in accordance with my direction.    Jose Thomas DO  Emergency Medicine  [facility] EMERGENCY DEPARTMENT      Jose Thomas DO  07/24/25 0304

## 2025-07-30 ENCOUNTER — PATIENT OUTREACH (OUTPATIENT)
Dept: NURSING | Facility: CLINIC | Age: 45
End: 2025-07-30
Payer: COMMERCIAL

## 2025-07-30 NOTE — PROGRESS NOTES
Clinic Care Coordination Contact  Follow Up Progress Note      Assessment: follow up on goals. CCRN called and spoke with group home staff today requested to talk to patient. CCRN was put on hold > 10 mins then staff member came back stating that patient was playing bingo and not available to speak to CCRN today.    Plan:  CCRN plans to follow up patient again in 4-6 weeks.

## 2025-07-31 NOTE — PROGRESS NOTES
Clinic Care Coordination Contact  Follow Up Progress Note      Assessment: CCRN spoke with patient today via phone.     TB clinic   Patient is scheduled  on 8/11/25 at 10:30 am. TB clinic already set up transportation.     [place] [facility] [address], [place]  [address]    [phone]    Med refill   [facility] is requesting a copy of patient's med list to be faxed over to Fax: [phone]. [facility]enu states staff manages patient's meds and give it to patient as scheduled but she's not sure how to refill his meds and patient's  not always picking up patient's meds.     Plan: Patient will attend TB clinic appt on 8/11/25.

## 2025-08-04 ENCOUNTER — HOSPITAL ENCOUNTER (EMERGENCY)
Facility: HOSPITAL | Age: 45
Discharge: HOME | End: 2025-08-04
Payer: COMMERCIAL

## 2025-08-04 ENCOUNTER — VIRTUAL VISIT (OUTPATIENT)
Dept: INTERPRETER SERVICES | Facility: CLINIC | Age: 45
End: 2025-08-04
Payer: COMMERCIAL

## 2025-08-04 VITALS
BODY MASS INDEX: 22.5 KG/M2 | HEIGHT: 62 IN | TEMPERATURE: 98.1 F | DIASTOLIC BLOOD PRESSURE: 84 MMHG | HEART RATE: 87 BPM | RESPIRATION RATE: 18 BRPM | OXYGEN SATURATION: 99 % | WEIGHT: 122.3 LBS | SYSTOLIC BLOOD PRESSURE: 130 MMHG

## 2025-08-04 DIAGNOSIS — M79.2 NEUROPATHIC PAIN: Primary | ICD-10-CM

## 2025-08-04 PROCEDURE — 250N000013 HC RX MED GY IP 250 OP 250 PS 637

## 2025-08-04 PROCEDURE — 96372 THER/PROPH/DIAG INJ SC/IM: CPT

## 2025-08-04 PROCEDURE — 250N000011 HC RX IP 250 OP 636

## 2025-08-04 PROCEDURE — 99284 EMERGENCY DEPT VISIT MOD MDM: CPT

## 2025-08-04 PROCEDURE — T1013 SIGN LANG/ORAL INTERPRETER: HCPCS | Mod: U4,TEL,95

## 2025-08-04 RX ORDER — GABAPENTIN 300 MG/1
300 CAPSULE ORAL 2 TIMES DAILY
Qty: 60 CAPSULE | Refills: 0 | Status: SHIPPED | OUTPATIENT
Start: 2025-08-04 | End: 2025-08-07

## 2025-08-04 RX ORDER — KETOROLAC TROMETHAMINE 15 MG/ML
15 INJECTION, SOLUTION INTRAMUSCULAR; INTRAVENOUS ONCE
Status: COMPLETED | OUTPATIENT
Start: 2025-08-04 | End: 2025-08-04

## 2025-08-04 RX ORDER — GABAPENTIN 100 MG/1
300 CAPSULE ORAL ONCE
Status: COMPLETED | OUTPATIENT
Start: 2025-08-04 | End: 2025-08-04

## 2025-08-04 RX ADMIN — GABAPENTIN 300 MG: 100 CAPSULE ORAL at 14:23

## 2025-08-04 RX ADMIN — KETOROLAC TROMETHAMINE 15 MG: 15 INJECTION, SOLUTION INTRAMUSCULAR; INTRAVENOUS at 14:29

## 2025-08-06 ENCOUNTER — APPOINTMENT (OUTPATIENT)
Dept: INTERPRETER SERVICES | Facility: CLINIC | Age: 45
End: 2025-08-06
Payer: COMMERCIAL

## 2025-08-07 ENCOUNTER — OFFICE VISIT (OUTPATIENT)
Dept: FAMILY MEDICINE | Facility: CLINIC | Age: 45
End: 2025-08-07
Payer: COMMERCIAL

## 2025-08-07 VITALS
TEMPERATURE: 97.5 F | OXYGEN SATURATION: 98 % | WEIGHT: 118.75 LBS | DIASTOLIC BLOOD PRESSURE: 89 MMHG | RESPIRATION RATE: 16 BRPM | SYSTOLIC BLOOD PRESSURE: 131 MMHG | HEIGHT: 62 IN | BODY MASS INDEX: 21.85 KG/M2 | HEART RATE: 96 BPM

## 2025-08-07 DIAGNOSIS — G62.9 NEUROPATHY: ICD-10-CM

## 2025-08-07 DIAGNOSIS — E55.9 VITAMIN D DEFICIENCY: ICD-10-CM

## 2025-08-07 DIAGNOSIS — Z91.148 NONCOMPLIANCE WITH MEDICATION REGIMEN: Primary | ICD-10-CM

## 2025-08-07 DIAGNOSIS — L29.9 PRURITIC DISORDER: ICD-10-CM

## 2025-08-07 DIAGNOSIS — F10.29 ALCOHOL DEPENDENCE WITH UNSPECIFIED ALCOHOL-INDUCED DISORDER (H): ICD-10-CM

## 2025-08-07 DIAGNOSIS — M54.50 BACK PAIN, LUMBOSACRAL: ICD-10-CM

## 2025-08-07 DIAGNOSIS — R41.89 COGNITIVE DEFICITS: ICD-10-CM

## 2025-08-07 DIAGNOSIS — G43.709 CHRONIC MIGRAINE WITHOUT AURA WITHOUT STATUS MIGRAINOSUS, NOT INTRACTABLE: ICD-10-CM

## 2025-08-07 DIAGNOSIS — B18.2 CHRONIC HEPATITIS C WITHOUT HEPATIC COMA (H): ICD-10-CM

## 2025-08-07 DIAGNOSIS — R80.9 TYPE 2 DIABETES MELLITUS WITH DIABETIC MICROALBUMINURIA, WITH LONG-TERM CURRENT USE OF INSULIN (H): ICD-10-CM

## 2025-08-07 DIAGNOSIS — Z79.4 TYPE 2 DIABETES MELLITUS WITH DIABETIC MICROALBUMINURIA, WITH LONG-TERM CURRENT USE OF INSULIN (H): ICD-10-CM

## 2025-08-07 DIAGNOSIS — E11.29 TYPE 2 DIABETES MELLITUS WITH DIABETIC MICROALBUMINURIA, WITH LONG-TERM CURRENT USE OF INSULIN (H): ICD-10-CM

## 2025-08-07 DIAGNOSIS — F32.2 SEVERE MAJOR DEPRESSION WITHOUT PSYCHOTIC FEATURES (H): ICD-10-CM

## 2025-08-07 DIAGNOSIS — E78.5 HYPERLIPIDEMIA LDL GOAL <100: ICD-10-CM

## 2025-08-07 DIAGNOSIS — H10.13 ALLERGIC CONJUNCTIVITIS, BILATERAL: ICD-10-CM

## 2025-08-07 RX ORDER — CETIRIZINE HYDROCHLORIDE 10 MG/1
10 TABLET ORAL DAILY
Qty: 30 TABLET | Refills: 1 | Status: SHIPPED | OUTPATIENT
Start: 2025-08-07

## 2025-08-07 RX ORDER — CELECOXIB 200 MG/1
200 CAPSULE ORAL DAILY
Qty: 60 CAPSULE | Refills: 1 | Status: SHIPPED | OUTPATIENT
Start: 2025-08-07

## 2025-08-07 RX ORDER — QUETIAPINE FUMARATE 50 MG/1
50 TABLET, FILM COATED ORAL AT BEDTIME
Qty: 30 TABLET | Refills: 2 | Status: SHIPPED | OUTPATIENT
Start: 2025-08-07

## 2025-08-07 RX ORDER — KETOROLAC TROMETHAMINE 30 MG/ML
30 INJECTION, SOLUTION INTRAMUSCULAR; INTRAVENOUS ONCE
Status: COMPLETED | OUTPATIENT
Start: 2025-08-07 | End: 2025-08-07

## 2025-08-07 RX ORDER — ERGOCALCIFEROL 1.25 MG/1
50000 CAPSULE, LIQUID FILLED ORAL WEEKLY
Qty: 12 CAPSULE | Refills: 0 | Status: SHIPPED | OUTPATIENT
Start: 2025-08-07

## 2025-08-07 RX ORDER — OLOPATADINE HYDROCHLORIDE 1 MG/ML
1 SOLUTION OPHTHALMIC 2 TIMES DAILY
Qty: 5 ML | Refills: 3 | Status: SHIPPED | OUTPATIENT
Start: 2025-08-07

## 2025-08-07 RX ORDER — ATORVASTATIN CALCIUM 20 MG/1
20 TABLET, FILM COATED ORAL DAILY
Qty: 90 TABLET | Refills: 3 | Status: SHIPPED | OUTPATIENT
Start: 2025-08-07

## 2025-08-07 RX ORDER — DIAPER,BRIEF,INFANT-TODD,DISP
EACH MISCELLANEOUS 2 TIMES DAILY
Qty: 30 G | Refills: 0 | Status: SHIPPED | OUTPATIENT
Start: 2025-08-07

## 2025-08-07 RX ORDER — GABAPENTIN 300 MG/1
300 CAPSULE ORAL 2 TIMES DAILY
Qty: 60 CAPSULE | Refills: 0 | Status: SHIPPED | OUTPATIENT
Start: 2025-08-07

## 2025-08-07 RX ORDER — LISINOPRIL 2.5 MG/1
2.5 TABLET ORAL DAILY
Qty: 90 TABLET | Refills: 3 | Status: CANCELLED | OUTPATIENT
Start: 2025-08-07

## 2025-08-07 RX ORDER — LANCETS
EACH MISCELLANEOUS
Qty: 200 EACH | Refills: 3 | Status: SHIPPED | OUTPATIENT
Start: 2025-08-07

## 2025-08-07 RX ORDER — METFORMIN HYDROCHLORIDE 500 MG/1
1000 TABLET, EXTENDED RELEASE ORAL 2 TIMES DAILY WITH MEALS
Qty: 360 TABLET | Refills: 3 | Status: SHIPPED | OUTPATIENT
Start: 2025-08-07

## 2025-08-07 RX ORDER — LISINOPRIL 10 MG/1
10 TABLET ORAL DAILY
Qty: 90 TABLET | Refills: 3 | Status: SHIPPED | OUTPATIENT
Start: 2025-08-07

## 2025-08-07 RX ORDER — LANCETS
EACH MISCELLANEOUS
Qty: 200 EACH | Refills: 3 | Status: SHIPPED | OUTPATIENT
Start: 2025-08-07 | End: 2025-08-07

## 2025-08-07 RX ORDER — LANOLIN ALCOHOL/MO/W.PET/CERES
100 CREAM (GRAM) TOPICAL DAILY
Qty: 90 TABLET | Refills: 3 | Status: SHIPPED | OUTPATIENT
Start: 2025-08-07

## 2025-08-07 RX ADMIN — KETOROLAC TROMETHAMINE 30 MG: 30 INJECTION, SOLUTION INTRAMUSCULAR; INTRAVENOUS at 15:03

## 2025-08-07 ASSESSMENT — PATIENT HEALTH QUESTIONNAIRE - PHQ9
SUM OF ALL RESPONSES TO PHQ QUESTIONS 1-9: 19
SUM OF ALL RESPONSES TO PHQ QUESTIONS 1-9: 19
10. IF YOU CHECKED OFF ANY PROBLEMS, HOW DIFFICULT HAVE THESE PROBLEMS MADE IT FOR YOU TO DO YOUR WORK, TAKE CARE OF THINGS AT HOME, OR GET ALONG WITH OTHER PEOPLE: EXTREMELY DIFFICULT

## 2025-08-19 ENCOUNTER — PATIENT OUTREACH (OUTPATIENT)
Dept: CARE COORDINATION | Facility: CLINIC | Age: 45
End: 2025-08-19
Payer: COMMERCIAL

## 2025-08-27 ENCOUNTER — PATIENT OUTREACH (OUTPATIENT)
Dept: CARE COORDINATION | Facility: CLINIC | Age: 45
End: 2025-08-27
Payer: COMMERCIAL

## 2025-08-28 ENCOUNTER — HOSPITAL ENCOUNTER (EMERGENCY)
Facility: HOSPITAL | Age: 45
End: 2025-08-28
Attending: EMERGENCY MEDICINE
Payer: COMMERCIAL

## 2025-08-28 ASSESSMENT — COLUMBIA-SUICIDE SEVERITY RATING SCALE - C-SSRS
2. HAVE YOU ACTUALLY HAD ANY THOUGHTS OF KILLING YOURSELF IN THE PAST MONTH?: NO
1. IN THE PAST MONTH, HAVE YOU WISHED YOU WERE DEAD OR WISHED YOU COULD GO TO SLEEP AND NOT WAKE UP?: NO
6. HAVE YOU EVER DONE ANYTHING, STARTED TO DO ANYTHING, OR PREPARED TO DO ANYTHING TO END YOUR LIFE?: NO

## 2025-09-03 ENCOUNTER — TELEPHONE (OUTPATIENT)
Dept: FAMILY MEDICINE | Facility: CLINIC | Age: 45
End: 2025-09-03
Payer: COMMERCIAL

## 2025-09-03 ENCOUNTER — PATIENT OUTREACH (OUTPATIENT)
Dept: CARE COORDINATION | Facility: CLINIC | Age: 45
End: 2025-09-03
Payer: COMMERCIAL